# Patient Record
Sex: MALE | Race: WHITE | NOT HISPANIC OR LATINO | Employment: OTHER | RURAL
[De-identification: names, ages, dates, MRNs, and addresses within clinical notes are randomized per-mention and may not be internally consistent; named-entity substitution may affect disease eponyms.]

---

## 2017-03-06 ENCOUNTER — OFFICE VISIT (OUTPATIENT)
Dept: FAMILY MEDICINE CLINIC | Facility: CLINIC | Age: 57
End: 2017-03-06

## 2017-03-06 VITALS
OXYGEN SATURATION: 98 % | TEMPERATURE: 99 F | DIASTOLIC BLOOD PRESSURE: 80 MMHG | HEART RATE: 70 BPM | WEIGHT: 164.8 LBS | HEIGHT: 66 IN | SYSTOLIC BLOOD PRESSURE: 130 MMHG | BODY MASS INDEX: 26.48 KG/M2

## 2017-03-06 DIAGNOSIS — S01.01XS SCALP LACERATION, SEQUELA: Primary | ICD-10-CM

## 2017-03-06 PROCEDURE — 99211 OFF/OP EST MAY X REQ PHY/QHP: CPT | Performed by: FAMILY MEDICINE

## 2017-03-06 NOTE — PROGRESS NOTES
Procedure   Suture Removal  Date/Time: 3/6/2017 2:02 PM  Performed by: RICHIE LAZCANO  Authorized by: RICHIE LAZCANO   Body area: head/neck  Location details: scalp  Wound Appearance: clean  Staples Removed: 4  Sutures placed in this facility: Elsewhere.  Patient tolerance: Patient tolerated the procedure well with no immediate complications

## 2017-03-29 ENCOUNTER — OFFICE VISIT (OUTPATIENT)
Dept: FAMILY MEDICINE CLINIC | Facility: CLINIC | Age: 57
End: 2017-03-29

## 2017-03-29 VITALS
HEART RATE: 67 BPM | HEIGHT: 66 IN | BODY MASS INDEX: 26.45 KG/M2 | TEMPERATURE: 98.4 F | SYSTOLIC BLOOD PRESSURE: 132 MMHG | OXYGEN SATURATION: 99 % | DIASTOLIC BLOOD PRESSURE: 72 MMHG | WEIGHT: 164.6 LBS

## 2017-03-29 DIAGNOSIS — R53.83 FATIGUE, UNSPECIFIED TYPE: ICD-10-CM

## 2017-03-29 DIAGNOSIS — E55.9 UNSPECIFIED VITAMIN D DEFICIENCY: ICD-10-CM

## 2017-03-29 DIAGNOSIS — Z86.010 HISTORY OF COLON POLYPS: ICD-10-CM

## 2017-03-29 DIAGNOSIS — Z72.89 OTHER PROBLEMS RELATED TO LIFESTYLE: ICD-10-CM

## 2017-03-29 DIAGNOSIS — Z00.00 ANNUAL PHYSICAL EXAM: ICD-10-CM

## 2017-03-29 DIAGNOSIS — R07.89 CHEST DISCOMFORT: ICD-10-CM

## 2017-03-29 DIAGNOSIS — Z23 NEED FOR PROPHYLACTIC VACCINATION AGAINST STREPTOCOCCUS PNEUMONIAE (PNEUMOCOCCUS): ICD-10-CM

## 2017-03-29 DIAGNOSIS — E78.2 MIXED HYPERLIPIDEMIA: Primary | ICD-10-CM

## 2017-03-29 DIAGNOSIS — Z12.5 SPECIAL SCREENING FOR MALIGNANT NEOPLASM OF PROSTATE: ICD-10-CM

## 2017-03-29 DIAGNOSIS — N40.0 BENIGN NON-NODULAR PROSTATIC HYPERPLASIA WITHOUT LOWER URINARY TRACT SYMPTOMS: ICD-10-CM

## 2017-03-29 LAB
BILIRUB BLD-MCNC: NEGATIVE MG/DL
CLARITY, POC: CLEAR
COLOR UR: YELLOW
GLUCOSE UR STRIP-MCNC: NEGATIVE MG/DL
KETONES UR QL: NEGATIVE
LEUKOCYTE EST, POC: NEGATIVE
NITRITE UR-MCNC: NEGATIVE MG/ML
PH UR: 7 [PH] (ref 5–8)
PROT UR STRIP-MCNC: NEGATIVE MG/DL
RBC # UR STRIP: NEGATIVE /UL
SP GR UR: 1.02 (ref 1–1.03)
UROBILINOGEN UR QL: NORMAL

## 2017-03-29 PROCEDURE — G0439 PPPS, SUBSEQ VISIT: HCPCS | Performed by: FAMILY MEDICINE

## 2017-03-29 PROCEDURE — 81003 URINALYSIS AUTO W/O SCOPE: CPT | Performed by: FAMILY MEDICINE

## 2017-03-29 PROCEDURE — G0102 PROSTATE CA SCREENING; DRE: HCPCS | Performed by: FAMILY MEDICINE

## 2017-03-29 RX ORDER — PRAVASTATIN SODIUM 40 MG
TABLET ORAL
Refills: 1 | COMMUNITY
Start: 2017-02-28 | End: 2017-10-26 | Stop reason: SDUPTHER

## 2017-03-29 RX ORDER — MELOXICAM 7.5 MG/1
7.5 TABLET ORAL DAILY
COMMUNITY
End: 2018-08-23

## 2017-03-29 RX ORDER — MELOXICAM 15 MG/1
7.5 TABLET ORAL
COMMUNITY
Start: 2017-03-26 | End: 2017-03-29 | Stop reason: SDUPTHER

## 2017-03-29 RX ORDER — LISINOPRIL 20 MG/1
TABLET ORAL
Refills: 3 | COMMUNITY
Start: 2017-02-23 | End: 2018-02-19 | Stop reason: SDUPTHER

## 2017-03-29 RX ORDER — GABAPENTIN 600 MG/1
TABLET ORAL 3 TIMES DAILY
Refills: 2 | COMMUNITY
Start: 2017-02-28

## 2017-03-29 RX ORDER — OXYCODONE AND ACETAMINOPHEN 10; 325 MG/1; MG/1
TABLET ORAL
Refills: 0 | COMMUNITY
Start: 2017-02-28 | End: 2019-07-22

## 2017-03-29 NOTE — PATIENT INSTRUCTIONS
Exercising to Stay Healthy  Exercising regularly is important. It has many health benefits, such as:  · Improving your overall fitness, flexibility, and endurance.  · Increasing your bone density.  · Helping with weight control.  · Decreasing your body fat.  · Increasing your muscle strength.  · Reducing stress and tension.  · Improving your overall health.  In order to become healthy and stay healthy, it is recommended that you do moderate-intensity and vigorous-intensity exercise. You can tell that you are exercising at a moderate intensity if you have a higher heart rate and faster breathing, but you are still able to hold a conversation. You can tell that you are exercising at a vigorous intensity if you are breathing much harder and faster and cannot hold a conversation while exercising.  HOW OFTEN SHOULD I EXERCISE?  Choose an activity that you enjoy and set realistic goals. Your health care provider can help you to make an activity plan that works for you. Exercise regularly as directed by your health care provider. This may include:   · Doing resistance training twice each week, such as:    Push-ups.    Sit-ups.    Lifting weights.    Using resistance bands.  · Doing a given intensity of exercise for a given amount of time. Choose from these options:    150 minutes of moderate-intensity exercise every week.    75 minutes of vigorous-intensity exercise every week.    A mix of moderate-intensity and vigorous-intensity exercise every week.  Children, pregnant women, people who are out of shape, people who are overweight, and older adults may need to consult a health care provider for individual recommendations. If you have any sort of medical condition, be sure to consult your health care provider before starting a new exercise program.   WHAT ARE SOME EXERCISE IDEAS?  Some moderate-intensity exercise ideas include:   · Walking at a rate of 1 mile in 15  minutes.  · Biking.  · Hiking.  · Golfing.  · Dancing.  Some vigorous-intensity exercise ideas include:   · Walking at a rate of at least 4.5 miles per hour.  · Jogging or running at a rate of 5 miles per hour.  · Biking at a rate of at least 10 miles per hour.  · Lap swimming.  · Roller-skating or in-line skating.  · Cross-country skiing.  · Vigorous competitive sports, such as football, basketball, and soccer.  · Jumping rope.  · Aerobic dancing.  WHAT ARE SOME EVERYDAY ACTIVITIES THAT CAN HELP ME TO GET EXERCISE?  · Yard work, such as:    Pushing a .    Raking and bagging leaves.  · Washing and waxing your car.  · Pushing a stroller.  · Shoveling snow.  · Gardening.  · Washing windows or floors.  HOW CAN I BE MORE ACTIVE IN MY DAY-TO-DAY ACTIVITIES?  · Use the stairs instead of the elevator.  · Take a walk during your lunch break.  · If you drive, park your car farther away from work or school.  · If you take public transportation, get off one stop early and walk the rest of the way.  · Make all of your phone calls while standing up and walking around.  · Get up, stretch, and walk around every 30 minutes throughout the day.  WHAT GUIDELINES SHOULD I FOLLOW WHILE EXERCISING?  · Do not exercise so much that you hurt yourself, feel dizzy, or get very short of breath.  · Consult your health care provider before starting a new exercise program.  · Wear comfortable clothes and shoes with good support.  · Drink plenty of water while you exercise to prevent dehydration or heat stroke. Body water is lost during exercise and must be replaced.  · Work out until you breathe faster and your heart beats faster.     This information is not intended to replace advice given to you by your health care provider. Make sure you discuss any questions you have with your health care provider.     Document Released: 01/20/2012 Document Revised: 01/08/2016 Document Reviewed: 05/21/2015  [employer] Patient Education  © [employer]    Medicare Wellness  Personal Prevention Plan of Service     Date of Office Visit:  2017  Encounter Provider:  Bennett Villanueva MD  Place of Service:  [facility]  Patient Name: Bryant Delgado  :  1960    As part of the Medicare Wellness portion of your visit today, we are providing you with this personalized preventive plan of services (PPPS). This plan is based upon recommendations of the [place] Preventive Services Task Force (USPSTF) and the Advisory Committee on Immunization Practices (ACIP).    This lists the preventive care services that should be considered, and provides dates of when you are due. Items listed as completed are up-to-date and do not require any further intervention.    Health Maintenance   Topic Date Due   • TDAP/TD VACCINES (1 - Tdap) 1979   • INFLUENZA VACCINE  2016   • HEPATITIS C SCREENING  2017   • LIPID PANEL  2017   • MEDICARE ANNUAL WELLNESS  2017   • COLONOSCOPY  10/03/2022       Orders Placed This Encounter   Procedures   • TSH   • T4, free   • Comprehensive Metabolic Panel   • Lipid Panel   • PSA   • Vitamin D 25 Hydroxy       Return in 6 months (on 2017).

## 2017-03-29 NOTE — PROGRESS NOTES
QUICK REFERENCE INFORMATION:  The ABCs of the Annual Wellness Visit    Subsequent Medicare Wellness Visit    HEALTH RISK ASSESSMENT    1960    Recent Hospitalizations:  No recent hospitalization(s)..        Current Medical Providers:  Patient Care Team:  Bennett Villanueva MD as PCP - General (Family Medicine)        Smoking Status:  History   Smoking Status   • Never Smoker   Smokeless Tobacco   • Current User       Alcohol Consumption:  History   Alcohol Use No       Depression Screen:   PHQ-9 Depression Screening 3/29/2017   Little interest or pleasure in doing things 0   Feeling down, depressed, or hopeless 0   Trouble falling or staying asleep, or sleeping too much 0   Feeling tired or having little energy 0   Poor appetite or overeating 0   Feeling bad about yourself - or that you are a failure or have let yourself or your family down 0   Trouble concentrating on things, such as reading the newspaper or watching television 0   Moving or speaking so slowly that other people could have noticed. Or the opposite - being so fidgety or restless that you have been moving around a lot more than usual 0   Thoughts that you would be better off dead, or of hurting yourself in some way 0   PHQ-9 Total Score 0   If you checked off any problems, how difficult have these problems made it for you to do your work, take care of things at home, or get along with other people? Not difficult at all       Health Habits and Functional and Cognitive Screening:  Functional & Cognitive Status 3/29/2017   Do you have difficulty preparing food and eating? No   Do you have difficulty bathing yourself? No   Do you have difficulty getting dressed? No   Do you have difficulty using the toilet? No   Do you have difficulty moving around from place to place? No   In the past year have you fallen or experienced a near fall? No   Do you need help using the phone?  No   Are you deaf or do you have serious difficulty hearing?  No   Do you  "need help with transportation? No   Do you need help shopping? No   Do you need help preparing meals?  No   Do you need help with housework?  No   Do you need help with laundry? No   Do you need help taking your medications? No   Do you need help managing money? No   Do you have difficulty concentrating, remembering or making decisions? No     -- The Timed Up and Go (TUG) Test (CDC Version)                  - Testing directions adhered to:                                  1) Patients wears regular footwear and may use walking aid(s) if    needed.                                  2) Patient to sit back in standard arm chair and identify a line 10 feet    away from patient on floor.                                  3) Test time begins at \"Go\" and stops when patient reseated in arm    chair.                    - Instructions read aloud (and demonstrated as applicable) to patient:                                      When I say \"Go,\" I want you to:                                    1) Stand up from the chair.                                  2) Walk to the line on the floor (10 feet away) at your normal pace.                                  3) Turn.                                  4) Walk back to the chair at your normal pace.                                  5) Sit down again.                    - Result: 3                    - Observations during test:Normal gait        Health Habits  Current Diet: Unhealthy Diet  Dental Exam: Not up to date    Eye Exam: Not up to date   Exercise (times per week): 0 times per week  Current Exercise Activities Include: None      Does the patient have evidence of cognitive impairment? No    Asprin use counseling:yes      Recent Lab Results:  CMP:     Lipid Panel:     LDL:     HbA1c:     Urine Microalbumin:     Visual Acuity:  No exam data present    Age-appropriate Screening Schedule:  Refer to the list below for future screening recommendations based on patient's age, sex and/or " medical conditions. Orders for these recommended tests are listed in the plan section. The patient has been provided with a written plan.    Health Maintenance   Topic Date Due   • TDAP/TD VACCINES (1 - Tdap) 02/24/1979   • INFLUENZA VACCINE  08/01/2016   • LIPID PANEL  03/29/2017   • COLONOSCOPY  10/03/2022        Subjective   History of Present Illness    Bryant Delgado is a 57 y.o. male who presents for an Subsequent Wellness Visit.    The following portions of the patient's history were reviewed and updated as appropriate: allergies, current medications, past family history, past medical history, past surgical history and problem list.    No outpatient prescriptions prior to visit.     No facility-administered medications prior to visit.        There is no problem list on file for this patient.      Advanced Care Planning:  has NO advanced directive - information provided to the patient today    Identification of Risk Factors:  Risk factors include: weight , unhealthy diet, cardiovascular risk and inactivity.    Review of Systems   Constitutional: Negative.    HENT: Negative.    Eyes: Negative.    Respiratory: Negative.    Cardiovascular:        Fluttery sensation in chest-breasts-not related to activity-cardiac referral   Gastrointestinal:        History of polyps plus family history positive for colon cancer-referred for colonoscopy   Endocrine: Negative.    Genitourinary: Negative.    Musculoskeletal: Negative.    Skin: Negative.    Neurological:        Last back surgery fall 2016   Hematological: Negative.    Psychiatric/Behavioral: Negative.        Compared to one year ago, the patient feels his physical health is worse.  Compared to one year ago, the patient feels his mental health is worse.    Objective     Physical Exam   Constitutional: He appears well-developed and well-nourished.   HENT:   Head: Normocephalic.   Right Ear: External ear normal.   Left Ear: External ear normal.   Mouth/Throat:  "Oropharynx is clear and moist.   Eyes: EOM are normal. Pupils are equal, round, and reactive to light.   Neck: Normal range of motion. No thyromegaly present.   Cardiovascular: Normal rate and regular rhythm.    No murmur heard.  Pulmonary/Chest: Effort normal and breath sounds normal. No respiratory distress.   Abdominal: Soft. Bowel sounds are normal.   Genitourinary: Rectum normal, prostate normal and penis normal.   Genitourinary Comments: Prostate exam no nodules raphae well-maintained   Musculoskeletal: Normal range of motion.   Lymphadenopathy:     He has no cervical adenopathy.   Neurological: He is alert.   Skin: Skin is warm and dry.   Psychiatric: He has a normal mood and affect. His behavior is normal. Thought content normal.   Vitals reviewed.      Vitals:    03/29/17 1003   BP: 132/72   Pulse: 67   Temp: 98.4 °F (36.9 °C)   SpO2: 99%   Weight: 164 lb 9.6 oz (74.7 kg)   Height: 66\" (167.6 cm)   PainSc: 0-No pain       Body mass index is 26.57 kg/(m^2).  Discussed the patient's BMI with him. The BMI is in the acceptable range.    Assessment/Plan   Patient Self-Management and Personalized Health Advice  The patient has been provided with information about: diet, exercise and weight management and preventive services including:   · Colorectal cancer screening, colonoscopy referral placed, Diabetes screening, see lab orders, Pneumococcal vaccine , Prostate cancer screening discussed.    Visit Diagnoses:    ICD-10-CM ICD-9-CM   1. Mixed hyperlipidemia E78.2 272.2   2. Fatigue, unspecified type R53.83 780.79   3. Unspecified vitamin D deficiency E55.9 268.9   4. Need for prophylactic vaccination against Streptococcus pneumoniae (pneumococcus) Z23 V03.82   5. Benign non-nodular prostatic hyperplasia without lower urinary tract symptoms N40.0 600.90       Orders Placed This Encounter   Procedures   • TSH   • T4, free   • Comprehensive Metabolic Panel   • Lipid Panel   • PSA   • Vitamin D 25 Hydroxy "       Outpatient Encounter Prescriptions as of 3/29/2017   Medication Sig Dispense Refill   • gabapentin (NEURONTIN) 600 MG tablet TAKE 1 TABLET BY MOUTH THREE TIMES A DAY FILL ON OR AFTER 1/28/17  2   • lisinopril (PRINIVIL,ZESTRIL) 20 MG tablet TAKE 1 TABLET BY MOUTH TWICE A DAY  3   • meloxicam (MOBIC) 7.5 MG tablet Take 7.5 mg by mouth Daily.     • oxyCODONE-acetaminophen (PERCOCET)  MG per tablet TAKE 1 TABLET BY MOUTH 3 TIMES A DAY  0   • pravastatin (PRAVACHOL) 40 MG tablet TAKE 1 TABLET BY MOUTH EVERY DAY AT BEDTIME  1   • [DISCONTINUED] meloxicam (MOBIC) 15 MG tablet 7.5 mg.       Facility-Administered Encounter Medications as of 3/29/2017   Medication Dose Route Frequency Provider Last Rate Last Dose   • pneumococcal conj. 13-valent (PREVNAR-13) vaccine 0.5 mL  0.5 mL Intramuscular Once Bennett Villanueva MD           Reviewed use of high risk medication in the elderly: yes  Reviewed for potential of harmful drug interactions in the elderly: yes    Follow Up:  Return in 6 months (on 9/29/2017).     An After Visit Summary and PPPS with all of these plans were given to the patient.       plan above-cardiology colonoscopy referrals

## 2017-03-30 ENCOUNTER — TELEPHONE (OUTPATIENT)
Dept: FAMILY MEDICINE CLINIC | Facility: CLINIC | Age: 57
End: 2017-03-30

## 2017-03-30 LAB
25(OH)D3+25(OH)D2 SERPL-MCNC: 22.4 NG/ML (ref 30–100)
ALBUMIN SERPL-MCNC: 4.1 G/DL (ref 3.5–5)
ALBUMIN/GLOB SERPL: 1.6 G/DL (ref 1.1–2.5)
ALP SERPL-CCNC: 51 U/L (ref 24–120)
ALT SERPL-CCNC: 20 U/L (ref 0–54)
AST SERPL-CCNC: 20 U/L (ref 7–45)
BASOPHILS # BLD AUTO: 0.02 10*3/MM3 (ref 0–0.2)
BASOPHILS NFR BLD AUTO: 0.4 % (ref 0–2)
BILIRUB SERPL-MCNC: 0.6 MG/DL (ref 0.1–1)
BUN SERPL-MCNC: 10 MG/DL (ref 5–21)
BUN/CREAT SERPL: 11 (ref 7–25)
CALCIUM SERPL-MCNC: 9.9 MG/DL (ref 8.4–10.4)
CHLORIDE SERPL-SCNC: 103 MMOL/L (ref 98–110)
CHOLEST SERPL-MCNC: 136 MG/DL (ref 130–200)
CO2 SERPL-SCNC: 28 MMOL/L (ref 24–31)
CREAT SERPL-MCNC: 0.91 MG/DL (ref 0.5–1.4)
EOSINOPHIL # BLD AUTO: 0.07 10*3/MM3 (ref 0–0.7)
EOSINOPHIL NFR BLD AUTO: 1.2 % (ref 0–4)
ERYTHROCYTE [DISTWIDTH] IN BLOOD BY AUTOMATED COUNT: 12.7 % (ref 12–15)
GLOBULIN SER CALC-MCNC: 2.6 GM/DL
GLUCOSE SERPL-MCNC: 96 MG/DL (ref 70–100)
HCT VFR BLD AUTO: 41.4 % (ref 40–52)
HCV AB S/CO SERPL IA: <0.1 S/CO RATIO (ref 0–0.9)
HDLC SERPL-MCNC: 43 MG/DL
HGB BLD-MCNC: 13.7 G/DL (ref 14–18)
IMM GRANULOCYTES # BLD: 0.01 10*3/MM3 (ref 0–0.03)
IMM GRANULOCYTES NFR BLD: 0.2 % (ref 0–5)
LDLC SERPL CALC-MCNC: 75 MG/DL (ref 0–99)
LYMPHOCYTES # BLD AUTO: 1.48 10*3/MM3 (ref 0.72–4.86)
LYMPHOCYTES NFR BLD AUTO: 26.2 % (ref 15–45)
MCH RBC QN AUTO: 29.7 PG (ref 28–32)
MCHC RBC AUTO-ENTMCNC: 33.1 G/DL (ref 33–36)
MCV RBC AUTO: 89.8 FL (ref 82–95)
MONOCYTES # BLD AUTO: 0.48 10*3/MM3 (ref 0.19–1.3)
MONOCYTES NFR BLD AUTO: 8.5 % (ref 4–12)
NEUTROPHILS # BLD AUTO: 3.59 10*3/MM3 (ref 1.87–8.4)
NEUTROPHILS NFR BLD AUTO: 63.5 % (ref 39–78)
PLATELET # BLD AUTO: 210 10*3/MM3 (ref 130–400)
POTASSIUM SERPL-SCNC: 4.5 MMOL/L (ref 3.5–5.3)
PROT SERPL-MCNC: 6.7 G/DL (ref 6.3–8.7)
PSA SERPL-MCNC: 0.59 NG/ML (ref 0–4)
RBC # BLD AUTO: 4.61 10*6/MM3 (ref 4.8–5.9)
SODIUM SERPL-SCNC: 142 MMOL/L (ref 135–145)
T4 FREE SERPL-MCNC: 0.84 NG/DL (ref 0.78–2.19)
TRIGL SERPL-MCNC: 91 MG/DL (ref 0–149)
TSH SERPL DL<=0.005 MIU/L-ACNC: 0.73 MIU/ML (ref 0.47–4.68)
VLDLC SERPL CALC-MCNC: 18.2 MG/DL
WBC # BLD AUTO: 5.65 10*3/MM3 (ref 4.8–10.8)

## 2017-03-30 NOTE — TELEPHONE ENCOUNTER
----- Message from Bennett Villanueva MD sent at 3/30/2017  7:19 AM CDT -----  Lab excellent except needs vitamin D 2000 international units a day–continue same also, copy to Dr. Alba

## 2017-10-26 RX ORDER — PRAVASTATIN SODIUM 40 MG
TABLET ORAL
Qty: 90 TABLET | Refills: 1 | Status: SHIPPED | OUTPATIENT
Start: 2017-10-26 | End: 2018-04-20 | Stop reason: SDUPTHER

## 2018-02-19 RX ORDER — LISINOPRIL 20 MG/1
TABLET ORAL
Qty: 180 TABLET | Refills: 0 | Status: SHIPPED | OUTPATIENT
Start: 2018-02-19 | End: 2018-05-21 | Stop reason: SDUPTHER

## 2018-04-20 RX ORDER — PRAVASTATIN SODIUM 40 MG
TABLET ORAL
Qty: 30 TABLET | Refills: 0 | Status: SHIPPED | OUTPATIENT
Start: 2018-04-20 | End: 2018-05-10 | Stop reason: SDUPTHER

## 2018-04-30 ENCOUNTER — TELEPHONE (OUTPATIENT)
Dept: FAMILY MEDICINE CLINIC | Facility: CLINIC | Age: 58
End: 2018-04-30

## 2018-05-09 ENCOUNTER — OFFICE VISIT (OUTPATIENT)
Dept: FAMILY MEDICINE CLINIC | Facility: CLINIC | Age: 58
End: 2018-05-09

## 2018-05-09 VITALS
SYSTOLIC BLOOD PRESSURE: 137 MMHG | DIASTOLIC BLOOD PRESSURE: 86 MMHG | HEIGHT: 66 IN | OXYGEN SATURATION: 99 % | TEMPERATURE: 98.8 F | WEIGHT: 167.6 LBS | HEART RATE: 68 BPM | BODY MASS INDEX: 26.93 KG/M2

## 2018-05-09 DIAGNOSIS — Z00.00 ANNUAL PHYSICAL EXAM: ICD-10-CM

## 2018-05-09 DIAGNOSIS — E78.2 MIXED HYPERLIPIDEMIA: ICD-10-CM

## 2018-05-09 DIAGNOSIS — E55.9 VITAMIN D DEFICIENCY: ICD-10-CM

## 2018-05-09 DIAGNOSIS — R53.83 FATIGUE, UNSPECIFIED TYPE: Primary | ICD-10-CM

## 2018-05-09 DIAGNOSIS — Z12.5 SPECIAL SCREENING FOR MALIGNANT NEOPLASM OF PROSTATE: ICD-10-CM

## 2018-05-09 DIAGNOSIS — I10 HYPERTENSION, UNSPECIFIED TYPE: ICD-10-CM

## 2018-05-09 LAB
BILIRUB BLD-MCNC: NEGATIVE MG/DL
CLARITY, POC: CLEAR
COLOR UR: YELLOW
GLUCOSE UR STRIP-MCNC: NEGATIVE MG/DL
KETONES UR QL: NEGATIVE
LEUKOCYTE EST, POC: NEGATIVE
NITRITE UR-MCNC: NEGATIVE MG/ML
PH UR: 5.5 [PH] (ref 5–8)
PROT UR STRIP-MCNC: NEGATIVE MG/DL
RBC # UR STRIP: ABNORMAL /UL
SP GR UR: 1.02 (ref 1–1.03)
UROBILINOGEN UR QL: NORMAL

## 2018-05-09 PROCEDURE — 81003 URINALYSIS AUTO W/O SCOPE: CPT | Performed by: FAMILY MEDICINE

## 2018-05-09 PROCEDURE — G0102 PROSTATE CA SCREENING; DRE: HCPCS | Performed by: FAMILY MEDICINE

## 2018-05-09 PROCEDURE — G0439 PPPS, SUBSEQ VISIT: HCPCS | Performed by: FAMILY MEDICINE

## 2018-05-09 RX ORDER — CYCLOBENZAPRINE HCL 10 MG
TABLET ORAL
COMMUNITY
Start: 2018-02-22 | End: 2020-08-31

## 2018-05-09 NOTE — PATIENT INSTRUCTIONS
Exercising to Stay Healthy  Exercising regularly is important. It has many health benefits, such as:  · Improving your overall fitness, flexibility, and endurance.  · Increasing your bone density.  · Helping with weight control.  · Decreasing your body fat.  · Increasing your muscle strength.  · Reducing stress and tension.  · Improving your overall health.  In order to become healthy and stay healthy, it is recommended that you do moderate-intensity and vigorous-intensity exercise. You can tell that you are exercising at a moderate intensity if you have a higher heart rate and faster breathing, but you are still able to hold a conversation. You can tell that you are exercising at a vigorous intensity if you are breathing much harder and faster and cannot hold a conversation while exercising.  How often should I exercise?  Choose an activity that you enjoy and set realistic goals. Your health care provider can help you to make an activity plan that works for you. Exercise regularly as directed by your health care provider. This may include:  · Doing resistance training twice each week, such as:  ¨ Push-ups.  ¨ Sit-ups.  ¨ Lifting weights.  ¨ Using resistance bands.  · Doing a given intensity of exercise for a given amount of time. Choose from these options:  ¨ 150 minutes of moderate-intensity exercise every week.  ¨ 75 minutes of vigorous-intensity exercise every week.  ¨ A mix of moderate-intensity and vigorous-intensity exercise every week.  Children, pregnant women, people who are out of shape, people who are overweight, and older adults may need to consult a health care provider for individual recommendations. If you have any sort of medical condition, be sure to consult your health care provider before starting a new exercise program.  What are some exercise ideas?  Some moderate-intensity exercise ideas include:  · Walking at a rate of 1 mile in 15  minutes.  · Biking.  · Hiking.  · Golfing.  · Dancing.  Some vigorous-intensity exercise ideas include:  · Walking at a rate of at least 4.5 miles per hour.  · Jogging or running at a rate of 5 miles per hour.  · Biking at a rate of at least 10 miles per hour.  · Lap swimming.  · Roller-skating or in-line skating.  · Cross-country skiing.  · Vigorous competitive sports, such as football, basketball, and soccer.  · Jumping rope.  · Aerobic dancing.  What are some everyday activities that can help me to get exercise?  · Yard work, such as:  ¨ Pushing a .  ¨ Raking and bagging leaves.  · Washing and waxing your car.  · Pushing a stroller.  · Shoveling snow.  · Gardening.  · Washing windows or floors.  How can I be more active in my day-to-day activities?  · Use the stairs instead of the elevator.  · Take a walk during your lunch break.  · If you drive, park your car farther away from work or school.  · If you take public transportation, get off one stop early and walk the rest of the way.  · Make all of your phone calls while standing up and walking around.  · Get up, stretch, and walk around every 30 minutes throughout the day.  What guidelines should I follow while exercising?  · Do not exercise so much that you hurt yourself, feel dizzy, or get very short of breath.  · Consult your health care provider before starting a new exercise program.  · Wear comfortable clothes and shoes with good support.  · Drink plenty of water while you exercise to prevent dehydration or heat stroke. Body water is lost during exercise and must be replaced.  · Work out until you breathe faster and your heart beats faster.  This information is not intended to replace advice given to you by your health care provider. Make sure you discuss any questions you have with your health care provider.  Document Released: 01/20/2012 Document Revised: 05/25/2017 Document Reviewed: 05/21/2015  [employer] Interactive Patient Education © 2017  [employer]    Fall Prevention in the Home  Falls can cause injuries and can affect people from all age groups. There are many simple things that you can do to make your home safe and to help prevent falls.  What can I do on the outside of my home?  · Regularly repair the edges of walkways and driveways and fix any cracks.  · Remove high doorway thresholds.  · Trim any shrubbery on the main path into your home.  · Use bright outdoor lighting.  · Clear walkways of debris and clutter, including tools and rocks.  · Regularly check that handrails are securely fastened and in good repair. Both sides of any steps should have handrails.  · Install guardrails along the edges of any raised decks or porches.  · Have leaves, snow, and ice cleared regularly.  · Use sand or salt on walkways during winter months.  · In the garage, clean up any spills right away, including grease or oil spills.  What can I do in the bathroom?  · Use night lights.  · Install grab bars by the toilet and in the tub and shower. Do not use towel bars as grab bars.  · Use non-skid mats or decals on the floor of the tub or shower.  · If you need to sit down while you are in the shower, use a plastic, non-slip stool.  · Keep the floor dry. Immediately clean up any water that spills on the floor.  · Remove soap buildup in the tub or shower on a regular basis.  · Attach bath mats securely with double-sided non-slip rug tape.  · Remove throw rugs and other tripping hazards from the floor.  What can I do in the bedroom?  · Use night lights.  · Make sure that a bedside light is easy to reach.  · Do not use oversized bedding that drapes onto the floor.  · Have a firm chair that has side arms to use for getting dressed.  · Remove throw rugs and other tripping hazards from the floor.  What can I do in the kitchen?  · Clean up any spills right away.  · Avoid walking on wet floors.  · Place frequently used items in easy-to-reach places.  · If you need to reach  for something above you, use a sturdy step stool that has a grab bar.  · Keep electrical cables out of the way.  · Do not use floor polish or wax that makes floors slippery. If you have to use wax, make sure that it is non-skid floor wax.  · Remove throw rugs and other tripping hazards from the floor.  What can I do in the stairways?  · Do not leave any items on the stairs.  · Make sure that there are handrails on both sides of the stairs. Fix handrails that are broken or loose. Make sure that handrails are as long as the stairways.  · Check any carpeting to make sure that it is firmly attached to the stairs. Fix any carpet that is loose or worn.  · Avoid having throw rugs at the top or bottom of stairways, or secure the rugs with carpet tape to prevent them from moving.  · Make sure that you have a light switch at the top of the stairs and the bottom of the stairs. If you do not have them, have them installed.  What are some other fall prevention tips?  · Wear closed-toe shoes that fit well and support your feet. Wear shoes that have rubber soles or low heels.  · When you use a stepladder, make sure that it is completely opened and that the sides are firmly locked. Have someone hold the ladder while you are using it. Do not climb a closed stepladder.  · Add color or contrast paint or tape to grab bars and handrails in your home. Place contrasting color strips on the first and last steps.  · Use mobility aids as needed, such as canes, walkers, scooters, and crutches.  · Turn on lights if it is dark. Replace any light bulbs that burn out.  · Set up furniture so that there are clear paths. Keep the furniture in the same spot.  · Fix any uneven floor surfaces.  · Choose a carpet design that does not hide the edge of steps of a stairway.  · Be aware of any and all pets.  · Review your medicines with your healthcare provider. Some medicines can cause dizziness or changes in blood pressure, which increase your risk of  falling.  Talk with your health care provider about other ways that you can decrease your risk of falls. This may include working with a physical therapist or  to improve your strength, balance, and endurance.  This information is not intended to replace advice given to you by your health care provider. Make sure you discuss any questions you have with your health care provider.  Document Released: 12/08/2003 Document Revised: 05/16/2017 Document Reviewed: 01/22/2016  [employer] Interactive Patient Education © 2017 [employer]    Mediterranean Diet  A Mediterranean diet refers to food and lifestyle choices that are based on the traditions of countries located on the [place]. This way of eating has been shown to help prevent certain conditions and improve outcomes for people who have chronic diseases, like kidney disease and heart disease.  What are tips for following this plan?  Lifestyle   · Cook and eat meals together with your family, when possible.  · Drink enough fluid to keep your urine clear or pale yellow.  · Be physically active every day. This includes:  ¨ Aerobic exercise like running or swimming.  ¨ Leisure activities like gardening, walking, or housework.  · Get 7-8 hours of sleep each night.  · If recommended by your health care provider, drink red wine in moderation. This means 1 glass a day for nonpregnant women and 2 glasses a day for men. A glass of wine equals 5 oz (150 mL).  Reading food labels   · Check the serving size of packaged foods. For foods such as rice and pasta, the serving size refers to the amount of cooked product, not dry.  · Check the total fat in packaged foods. Avoid foods that have saturated fat or trans fats.  · Check the ingredients list for added sugars, such as corn syrup.  Shopping   · At the grocery store, buy most of your food from the areas near the walls of the store. This includes:  ¨ Fresh fruits and vegetables (produce).  ¨ Grains, beans, nuts, and  seeds. Some of these may be available in unpackaged forms or large amounts (in bulk).  ¨ Fresh seafood.  ¨ Poultry and eggs.  ¨ Low-fat dairy products.  · Buy whole ingredients instead of prepackaged foods.  · Buy fresh fruits and vegetables in-season from local farmers markets.  · Buy frozen fruits and vegetables in resealable bags.  · If you do not have access to quality fresh seafood, buy precooked frozen shrimp or canned fish, such as tuna, salmon, or sardines.  · Buy small amounts of raw or cooked vegetables, salads, or olives from the deli or salad bar at your store.  · Stock your pantry so you always have certain foods on hand, such as olive oil, canned tuna, canned tomatoes, rice, pasta, and beans.  Cooking   · Cook foods with extra-virgin olive oil instead of using butter or other vegetable oils.  · Have meat as a side dish, and have vegetables or grains as your main dish. This means having meat in small portions or adding small amounts of meat to foods like pasta or stew.  · Use beans or vegetables instead of meat in common dishes like chili or lasagna.  · [place] with different cooking methods. Try roasting or broiling vegetables instead of steaming or sautéeing them.  · Add frozen vegetables to soups, stews, pasta, or rice.  · Add nuts or seeds for added healthy fat at each meal. You can add these to yogurt, salads, or vegetable dishes.  · Marinate fish or vegetables using olive oil, lemon juice, garlic, and fresh herbs.  Meal planning   · Plan to eat 1 vegetarian meal one day each week. Try to work up to 2 vegetarian meals, if possible.  · Eat seafood 2 or more times a week.  · Have healthy snacks readily available, such as:  ¨ Vegetable sticks with hummus.  ¨ Greek yogurt.  ¨ Fruit and nut trail mix.  · Eat balanced meals throughout the week. This includes:  ¨ Fruit: 2-3 servings a day  ¨ Vegetables: 4-5 servings a day  ¨ Low-fat dairy: 2 servings a day  ¨ Fish, poultry, or lean meat: 1 serving a  day  ¨ Beans and legumes: 2 or more servings a week  ¨ Nuts and seeds: 1-2 servings a day  ¨ Whole grains: 6-8 servings a day  ¨ Extra-virgin olive oil: 3-4 servings a day  · Limit red meat and sweets to only a few servings a month  What are my food choices?  · Mediterranean diet  ¨ Recommended  ¨ Grains: Whole-grain pasta. Brown rice. Bulgar wheat. Polenta. Couscous. Whole-wheat bread. Oatmeal. Quinoa.  ¨ Vegetables: Artichokes. Beets. Broccoli. Cabbage. Carrots. Eggplant. Green beans. Chard. Kale. Spinach. Onions. Leeks. Peas. Squash. Tomatoes. Peppers. Radishes.  ¨ Fruits: Apples. Apricots. Avocado. Berries. Bananas. Cherries. Dates. Figs. Grapes. Srinath. Melon. Oranges. Peaches. Plums. Pomegranate.  ¨ Meats and other protein foods: Beans. Almonds. Sunflower seeds. Pine nuts. Peanuts. Cod. [place]. Scallops. Shrimp. Tuna. Tilapia. Clams. Oysters. Eggs.  ¨ Dairy: Low-fat milk. Cheese. Greek yogurt.  ¨ Beverages: Water. Red wine. Herbal tea.  ¨ Fats and oils: Extra virgin olive oil. Avocado oil. Grape seed oil.  ¨ Sweets and desserts: Greek yogurt with honey. Baked apples. Poached pears. Trail mix.  ¨ Seasoning and other foods: Basil. Cilantro. Coriander. Cumin. Mint. Parsley. Eddie. Rosemary. Tarragon. Garlic. Oregano. Thyme. Pepper. Balsalmic vinegar. Tahini. Hummus. Tomato sauce. Olives. Mushrooms.  ¨ Limit these  ¨ Grains: Prepackaged pasta or rice dishes. Prepackaged cereal with added sugar.  ¨ Vegetables: Deep fried potatoes (french fries).  ¨ Fruits: Fruit canned in syrup.  ¨ Meats and other protein foods: Beef. Pork. Lamb. Poultry with skin. Hot dogs. [place].  ¨ Dairy: Ice cream. Sour cream. Whole milk.  ¨ Beverages: Juice. Sugar-sweetened soft drinks. Beer. Liquor and spirits.  ¨ Fats and oils: Butter. Canola oil. Vegetable oil. Beef fat (tallow). Lard.  ¨ Sweets and desserts: Cookies. Cakes. Pies. Candy.  ¨ Seasoning and other foods: Mayonnaise. Premade sauces and marinades.  ¨ The items listed may not be a  complete list. Talk with your dietitian about what dietary choices are right for you.  Summary  · The Mediterranean diet includes both food and lifestyle choices.  · Eat a variety of fresh fruits and vegetables, beans, nuts, seeds, and whole grains.  · Limit the amount of red meat and sweets that you eat.  · Talk with your health care provider about whether it is safe for you to drink red wine in moderation. This means 1 glass a day for nonpregnant women and 2 glasses a day for men. A glass of wine equals 5 oz (150 mL).  This information is not intended to replace advice given to you by your health care provider. Make sure you discuss any questions you have with your health care provider.  Document Released: 08/10/2017 Document Revised: 2017 Document Reviewed: 08/10/2017  [employer] Interactive Patient Education © 2017 [employer]    Medicare Wellness  Personal Prevention Plan of Service     Date of Office Visit:  2018  Encounter Provider:  Bennett Villanueva MD  Place of Service:  [facility]  Patient Name: Bryant Delgado  :  1960    As part of the Medicare Wellness portion of your visit today, we are providing you with this personalized preventive plan of services (PPPS). This plan is based upon recommendations of the [place] Preventive Services Task Force (USPSTF) and the Advisory Committee on Immunization Practices (ACIP).    This lists the preventive care services that should be considered, and provides dates of when you are due. Items listed as completed are up-to-date and do not require any further intervention.    Health Maintenance   Topic Date Due   • INFLUENZA VACCINE  2018   • MEDICARE ANNUAL WELLNESS  2019   • LIPID PANEL  2019   • COLONOSCOPY  10/03/2022   • HEPATITIS C SCREENING  Completed   • TDAP/TD VACCINES  Excluded       Orders Placed This Encounter   Procedures   • TSH   • T4, free   • Comprehensive Metabolic  Panel   • Lipid Panel   • PSA Screen   • Vitamin D 25 Hydroxy   • POC Urinalysis Dipstick, Automated   • CBC & Differential     Order Specific Question:   Manual Differential     Answer:   No       Return in 6 months (on 11/9/2018).

## 2018-05-09 NOTE — PROGRESS NOTES
QUICK REFERENCE INFORMATION:  The ABCs of the Annual Wellness Visit    Subsequent Medicare Wellness Visit    HEALTH RISK ASSESSMENT    1960    Recent Hospitalizations:  No hospitalization(s) within the last year..        Current Medical Providers:  Patient Care Team:  Bennett Villanueva MD as PCP - General (Family Medicine)        Smoking Status:  History   Smoking Status   • Never Smoker   Smokeless Tobacco   • Current User       Alcohol Consumption:  History   Alcohol Use No       Depression Screen:   PHQ-2/PHQ-9 Depression Screening 5/9/2018   Little interest or pleasure in doing things 0   Feeling down, depressed, or hopeless 0   Trouble falling or staying asleep, or sleeping too much -   Feeling tired or having little energy -   Poor appetite or overeating -   Feeling bad about yourself - or that you are a failure or have let yourself or your family down -   Trouble concentrating on things, such as reading the newspaper or watching television -   Moving or speaking so slowly that other people could have noticed. Or the opposite - being so fidgety or restless that you have been moving around a lot more than usual -   Thoughts that you would be better off dead, or of hurting yourself in some way -   Total Score 0   If you checked off any problems, how difficult have these problems made it for you to do your work, take care of things at home, or get along with other people? -       Health Habits and Functional and Cognitive Screening:  Functional & Cognitive Status 5/9/2018   Do you have difficulty preparing food and eating? No   Do you have difficulty bathing yourself, getting dressed or grooming yourself? No   Do you have difficulty using the toilet? Yes   Do you have difficulty moving around from place to place? Yes   Do you have trouble with steps or getting out of a bed or a chair? Yes   In the past year have you fallen or experienced a near fall? Yes   Current Diet Unhealthy Diet   Dental Exam Not  up to date   Eye Exam Up to date   Exercise (times per week) 0 times per week   Current Exercise Activities Include None   Do you need help using the phone?  Yes   Are you deaf or do you have serious difficulty hearing?  No   Do you need help with transportation? No   Do you need help shopping? No   Do you need help preparing meals?  No   Do you need help with housework?  No   Do you need help with laundry? No   Do you need help taking your medications? No   Do you need help managing money? No   Do you ever drive or ride in a car without wearing a seat belt? No   Have you felt unusual stress, anger or loneliness in the last month? Yes   Who do you live with? Spouse   If you need help, do you have trouble finding someone available to you? No   Have you been bothered in the last four weeks by sexual problems? No   Do you have difficulty concentrating, remembering or making decisions? Yes           Does the patient have evidence of cognitive impairment? Yes    Aspirin use counseling: Taking ASA appropriately as indicated      Recent Lab Results:  CMP:  Lab Results   Component Value Date    GLU 96 03/29/2017    BUN 10 03/29/2017    CREATININE 0.91 03/29/2017    EGFRIFNONA 86 03/29/2017    EGFRIFAFRI 104 03/29/2017    BCR 11.0 03/29/2017     03/29/2017    K 4.5 03/29/2017    CO2 28.0 03/29/2017    CALCIUM 9.9 03/29/2017    PROTENTOTREF 6.7 03/29/2017    ALBUMIN 4.10 03/29/2017    LABGLOBREF 2.6 03/29/2017    LABIL2 1.6 03/29/2017    BILITOT 0.6 03/29/2017    ALKPHOS 51 03/29/2017    AST 20 03/29/2017    ALT 20 03/29/2017     Lipid Panel:  Lab Results   Component Value Date    TRIG 91 03/29/2017    HDL 43 03/29/2017    VLDL 18.2 03/29/2017     HbA1c:       Visual Acuity:  No exam data present    Age-appropriate Screening Schedule:  Refer to the list below for future screening recommendations based on patient's age, sex and/or medical conditions. Orders for these recommended tests are listed in the plan section. The  patient has been provided with a written plan.    Health Maintenance   Topic Date Due   • INFLUENZA VACCINE  08/01/2018   • LIPID PANEL  05/09/2019   • COLONOSCOPY  10/03/2022   • TDAP/TD VACCINES  Excluded        Subjective   History of Present Illness    Bryant Delgado is a 58 y.o. male who presents for an Subsequent Wellness Visit.    The following portions of the patient's history were reviewed and updated as appropriate: allergies, current medications, past family history, past medical history, past social history, past surgical history and problem list.    Outpatient Medications Prior to Visit   Medication Sig Dispense Refill   • gabapentin (NEURONTIN) 600 MG tablet TAKE 1 TABLET BY MOUTH THREE TIMES A DAY FILL ON OR AFTER 1/28/17  2   • lisinopril (PRINIVIL,ZESTRIL) 20 MG tablet TAKE 1 TABLET BY MOUTH TWICE A  tablet 0   • oxyCODONE-acetaminophen (PERCOCET)  MG per tablet TAKE 1 TABLET BY MOUTH 3 TIMES A DAY  0   • pravastatin (PRAVACHOL) 40 MG tablet TAKE 1 TABLET BY MOUTH EVERY DAY AT BEDTIME 30 tablet 0   • meloxicam (MOBIC) 7.5 MG tablet Take 7.5 mg by mouth Daily.       No facility-administered medications prior to visit.        There is no problem list on file for this patient.      Advance Care Planning:  has NO advance directive - information provided to the patient today    Identification of Risk Factors:  Risk factors include: unhealthy diet, cardiovascular risk, inactivity and increased fall risk.    Review of Systems   HENT:        Getting teeth pulled   Cardiovascular: Negative for chest pain.        Has not taken cholesterol medicine about 10 days   Gastrointestinal:        Mother had colon cancer-was due last year but  endoscopist required payment of previous bill first   All other systems reviewed and are negative.      Compared to one year ago, the patient feels his physical health is the same.  Compared to one year ago, the patient feels his mental health is the  "same.    Objective     Physical Exam   Constitutional: He is oriented to person, place, and time. He appears well-developed and well-nourished.   HENT:   Right Ear: External ear normal.   Left Ear: External ear normal.   Mouth/Throat: Oropharynx is clear and moist.   Poor dentition   Eyes: EOM are normal. Pupils are equal, round, and reactive to light.   Neck: No thyromegaly present.   Normal carotids   Cardiovascular: Normal rate and regular rhythm.    Pulmonary/Chest: Effort normal and breath sounds normal.   Abdominal: Soft. Bowel sounds are normal. He exhibits no mass.   Genitourinary: Rectum normal, prostate normal and penis normal.   Genitourinary Comments: Testicles normal, no inguinal hernias or adenopathy-prostate normal size raphae maintained   Musculoskeletal: He exhibits no edema.   Lymphadenopathy:     He has no cervical adenopathy.   Neurological: He is alert and oriented to person, place, and time.   Skin: Skin is warm and dry.   Psychiatric: He has a normal mood and affect. His behavior is normal. Judgment and thought content normal.   Nursing note and vitals reviewed.      Vitals:    05/09/18 0846   BP: 137/86   BP Location: Right arm   Patient Position: Sitting   Cuff Size: Adult   Pulse: 68   Temp: 98.8 °F (37.1 °C)   TempSrc: Oral   SpO2: 99%   Weight: 76 kg (167 lb 9.6 oz)   Height: 167.6 cm (66\")   PainSc:   4   PainLoc: Back       Patient's Body mass index is 27.05 kg/m². BMI is within normal parameters. No follow-up required.      Assessment/Plan   Patient Self-Management and Personalized Health Advice  The patient has been provided with information about: diet, exercise, prevention of cardiac or vascular disease and fall prevention and preventive services including:   · Fall Risk plan of care done, Prostate cancer screening discussed.    Visit Diagnoses:    ICD-10-CM ICD-9-CM   1. Fatigue, unspecified type R53.83 780.79   2. Mixed hyperlipidemia E78.2 272.2   3. Special screening for " malignant neoplasm of prostate Z12.5 V76.44   4. Vitamin D deficiency E55.9 268.9       No orders of the defined types were placed in this encounter.  Plan-above-CN endoscopist about colonoscopy this year    Outpatient Encounter Prescriptions as of 5/9/2018   Medication Sig Dispense Refill   • gabapentin (NEURONTIN) 600 MG tablet TAKE 1 TABLET BY MOUTH THREE TIMES A DAY FILL ON OR AFTER 1/28/17  2   • lisinopril (PRINIVIL,ZESTRIL) 20 MG tablet TAKE 1 TABLET BY MOUTH TWICE A  tablet 0   • oxyCODONE-acetaminophen (PERCOCET)  MG per tablet TAKE 1 TABLET BY MOUTH 3 TIMES A DAY  0   • pravastatin (PRAVACHOL) 40 MG tablet TAKE 1 TABLET BY MOUTH EVERY DAY AT BEDTIME 30 tablet 0   • cyclobenzaprine (FLEXERIL) 10 MG tablet      • meloxicam (MOBIC) 7.5 MG tablet Take 7.5 mg by mouth Daily.       No facility-administered encounter medications on file as of 5/9/2018.        Reviewed use of high risk medication in the elderly: yes  Reviewed for potential of harmful drug interactions in the elderly: yes    Follow Up:  No Follow-up on file.     An After Visit Summary and PPPS with all of these plans were given to the patient.

## 2018-05-10 DIAGNOSIS — E03.9 ACQUIRED HYPOTHYROIDISM: ICD-10-CM

## 2018-05-10 DIAGNOSIS — E78.2 MIXED HYPERLIPIDEMIA: Primary | ICD-10-CM

## 2018-05-10 LAB
25(OH)D3+25(OH)D2 SERPL-MCNC: 21.3 NG/ML (ref 30–100)
ALBUMIN SERPL-MCNC: 3.9 G/DL (ref 3.5–5)
ALBUMIN/GLOB SERPL: 1.6 G/DL (ref 1.1–2.5)
ALP SERPL-CCNC: 46 U/L (ref 24–120)
ALT SERPL-CCNC: 25 U/L (ref 0–54)
AST SERPL-CCNC: 21 U/L (ref 7–45)
BASOPHILS # BLD AUTO: 0.04 10*3/MM3 (ref 0–0.2)
BASOPHILS NFR BLD AUTO: 0.8 % (ref 0–2)
BILIRUB SERPL-MCNC: 0.4 MG/DL (ref 0.1–1)
BUN SERPL-MCNC: 11 MG/DL (ref 5–21)
BUN/CREAT SERPL: 13.4 (ref 7–25)
CALCIUM SERPL-MCNC: 9.9 MG/DL (ref 8.4–10.4)
CHLORIDE SERPL-SCNC: 104 MMOL/L (ref 98–110)
CHOLEST SERPL-MCNC: 185 MG/DL (ref 130–200)
CO2 SERPL-SCNC: 26 MMOL/L (ref 24–31)
CREAT SERPL-MCNC: 0.82 MG/DL (ref 0.5–1.4)
EOSINOPHIL # BLD AUTO: 0.11 10*3/MM3 (ref 0–0.7)
EOSINOPHIL NFR BLD AUTO: 2.3 % (ref 0–4)
ERYTHROCYTE [DISTWIDTH] IN BLOOD BY AUTOMATED COUNT: 12.7 % (ref 12–15)
GFR SERPLBLD CREATININE-BSD FMLA CKD-EPI: 117 ML/MIN/1.73
GFR SERPLBLD CREATININE-BSD FMLA CKD-EPI: 96 ML/MIN/1.73
GLOBULIN SER CALC-MCNC: 2.5 GM/DL
GLUCOSE SERPL-MCNC: 95 MG/DL (ref 70–100)
HCT VFR BLD AUTO: 42.9 % (ref 40–52)
HDLC SERPL-MCNC: 46 MG/DL
HGB BLD-MCNC: 14.1 G/DL (ref 14–18)
IMM GRANULOCYTES # BLD: 0.02 10*3/MM3 (ref 0–0.03)
IMM GRANULOCYTES NFR BLD: 0.4 % (ref 0–5)
LDLC SERPL CALC-MCNC: 127 MG/DL (ref 0–99)
LYMPHOCYTES # BLD AUTO: 1.62 10*3/MM3 (ref 0.72–4.86)
LYMPHOCYTES NFR BLD AUTO: 33.5 % (ref 15–45)
MCH RBC QN AUTO: 29.9 PG (ref 28–32)
MCHC RBC AUTO-ENTMCNC: 32.9 G/DL (ref 33–36)
MCV RBC AUTO: 91.1 FL (ref 82–95)
MONOCYTES # BLD AUTO: 0.6 10*3/MM3 (ref 0.19–1.3)
MONOCYTES NFR BLD AUTO: 12.4 % (ref 4–12)
NEUTROPHILS # BLD AUTO: 2.44 10*3/MM3 (ref 1.87–8.4)
NEUTROPHILS NFR BLD AUTO: 50.6 % (ref 39–78)
NRBC BLD AUTO-RTO: 0 /100 WBC (ref 0–0)
PLATELET # BLD AUTO: 232 10*3/MM3 (ref 130–400)
POTASSIUM SERPL-SCNC: 4.7 MMOL/L (ref 3.5–5.3)
PROT SERPL-MCNC: 6.4 G/DL (ref 6.3–8.7)
PSA SERPL-MCNC: 0.56 NG/ML (ref 0–4)
RBC # BLD AUTO: 4.71 10*6/MM3 (ref 4.8–5.9)
SODIUM SERPL-SCNC: 141 MMOL/L (ref 135–145)
T4 FREE SERPL-MCNC: 0.95 NG/DL (ref 0.78–2.19)
TRIGL SERPL-MCNC: 58 MG/DL (ref 0–149)
TSH SERPL DL<=0.005 MIU/L-ACNC: 0.38 MIU/ML (ref 0.47–4.68)
VLDLC SERPL CALC-MCNC: 11.6 MG/DL
WBC # BLD AUTO: 4.83 10*3/MM3 (ref 4.8–10.8)

## 2018-05-10 RX ORDER — PRAVASTATIN SODIUM 40 MG
40 TABLET ORAL
Qty: 90 TABLET | Refills: 3 | Status: SHIPPED | OUTPATIENT
Start: 2018-05-10 | End: 2018-08-11 | Stop reason: SDUPTHER

## 2018-05-22 RX ORDER — LISINOPRIL 20 MG/1
TABLET ORAL
Qty: 180 TABLET | Refills: 3 | Status: SHIPPED | OUTPATIENT
Start: 2018-05-22 | End: 2018-08-11 | Stop reason: SDUPTHER

## 2018-07-10 ENCOUNTER — RESULTS ENCOUNTER (OUTPATIENT)
Dept: FAMILY MEDICINE CLINIC | Facility: CLINIC | Age: 58
End: 2018-07-10

## 2018-07-10 DIAGNOSIS — E78.2 MIXED HYPERLIPIDEMIA: ICD-10-CM

## 2018-07-10 DIAGNOSIS — E03.9 ACQUIRED HYPOTHYROIDISM: ICD-10-CM

## 2018-08-07 LAB
ALBUMIN SERPL-MCNC: 4.3 G/DL (ref 3.5–5)
ALBUMIN/GLOB SERPL: 1.7 G/DL (ref 1.1–2.5)
ALP SERPL-CCNC: 52 U/L (ref 24–120)
ALT SERPL-CCNC: 31 U/L (ref 0–54)
AST SERPL-CCNC: 25 U/L (ref 7–45)
BILIRUB SERPL-MCNC: 0.3 MG/DL (ref 0.1–1)
BUN SERPL-MCNC: 9 MG/DL (ref 5–21)
BUN/CREAT SERPL: 10.2 (ref 7–25)
CALCIUM SERPL-MCNC: 10 MG/DL (ref 8.4–10.4)
CHLORIDE SERPL-SCNC: 105 MMOL/L (ref 98–110)
CHOLEST SERPL-MCNC: 154 MG/DL (ref 130–200)
CO2 SERPL-SCNC: 25 MMOL/L (ref 24–31)
CREAT SERPL-MCNC: 0.88 MG/DL (ref 0.5–1.4)
GLOBULIN SER CALC-MCNC: 2.6 GM/DL
GLUCOSE SERPL-MCNC: 95 MG/DL (ref 70–100)
HDLC SERPL-MCNC: 44 MG/DL
LDLC SERPL CALC-MCNC: 91 MG/DL (ref 0–99)
POTASSIUM SERPL-SCNC: 4.9 MMOL/L (ref 3.5–5.3)
PROT SERPL-MCNC: 6.9 G/DL (ref 6.3–8.7)
SODIUM SERPL-SCNC: 143 MMOL/L (ref 135–145)
T4 FREE SERPL-MCNC: 0.81 NG/DL (ref 0.78–2.19)
TRIGL SERPL-MCNC: 96 MG/DL (ref 0–149)
TSH SERPL DL<=0.005 MIU/L-ACNC: 0.79 MIU/ML (ref 0.47–4.68)
VLDLC SERPL CALC-MCNC: 19.2 MG/DL

## 2018-08-10 NOTE — TELEPHONE ENCOUNTER
MED REFILL REQUEST        PRAVASTATIN 40MG (1) QHS   LISINOPRIL 20MG (1) BID    90 DAYS    [facility]

## 2018-08-11 RX ORDER — PRAVASTATIN SODIUM 40 MG
40 TABLET ORAL
Qty: 90 TABLET | Refills: 1 | Status: SHIPPED | OUTPATIENT
Start: 2018-08-11 | End: 2020-09-11

## 2018-08-11 RX ORDER — LISINOPRIL 20 MG/1
20 TABLET ORAL 2 TIMES DAILY
Qty: 180 TABLET | Refills: 1 | Status: SHIPPED | OUTPATIENT
Start: 2018-08-11 | End: 2020-08-14

## 2018-08-23 ENCOUNTER — OFFICE VISIT (OUTPATIENT)
Dept: FAMILY MEDICINE CLINIC | Facility: CLINIC | Age: 58
End: 2018-08-23

## 2018-08-23 VITALS
OXYGEN SATURATION: 98 % | BODY MASS INDEX: 28.12 KG/M2 | DIASTOLIC BLOOD PRESSURE: 73 MMHG | WEIGHT: 175 LBS | SYSTOLIC BLOOD PRESSURE: 131 MMHG | HEART RATE: 67 BPM | TEMPERATURE: 98.1 F | HEIGHT: 66 IN

## 2018-08-23 DIAGNOSIS — J01.10 SUBACUTE FRONTAL SINUSITIS: Primary | ICD-10-CM

## 2018-08-23 PROCEDURE — 99213 OFFICE O/P EST LOW 20 MIN: CPT | Performed by: FAMILY MEDICINE

## 2018-08-23 NOTE — PROGRESS NOTES
Subjective   Bryant Delgado is a 58 y.o. male.     Eye Problem    The left eye is affected. This is a new problem. The current episode started in the past 7 days. The problem occurs daily. The problem has been gradually improving. There was no injury mechanism. The patient is experiencing no pain. There is no known exposure to pink eye. He does not wear contacts. Associated symptoms include eye redness. Pertinent negatives include no blurred vision. He has tried nothing for the symptoms.       The following portions of the patient's history were reviewed and updated as appropriate: allergies, current medications, past family history, past medical history, past social history, past surgical history and problem list.    Review of Systems   HENT: Positive for facial swelling, postnasal drip and sinus pain.    Eyes: Positive for redness. Negative for blurred vision.       Objective   Physical Exam   Constitutional: He is oriented to person, place, and time.   HENT:   Nose: Nose normal.   Mouth/Throat: Oropharynx is clear and moist.   Eyes: Left eye exhibits no discharge.   Subconjunctival bruise left inner aspect-observation   Cardiovascular: Normal rate and regular rhythm.    Pulmonary/Chest: Effort normal and breath sounds normal.   Lymphadenopathy:     He has no cervical adenopathy.   Neurological: He is alert and oriented to person, place, and time.   Psychiatric: He has a normal mood and affect. His behavior is normal. Thought content normal.   Nursing note and vitals reviewed.      Assessment/Plan   Bryant was seen today for eye problem.    Diagnoses and all orders for this visit:    Subacute frontal sinusitis       Flonase plus heat for eye

## 2018-11-15 ENCOUNTER — OFFICE VISIT (OUTPATIENT)
Dept: FAMILY MEDICINE CLINIC | Facility: CLINIC | Age: 58
End: 2018-11-15

## 2018-11-15 VITALS
WEIGHT: 171.2 LBS | DIASTOLIC BLOOD PRESSURE: 78 MMHG | OXYGEN SATURATION: 98 % | BODY MASS INDEX: 27.51 KG/M2 | SYSTOLIC BLOOD PRESSURE: 134 MMHG | TEMPERATURE: 98.2 F | HEART RATE: 65 BPM | HEIGHT: 66 IN

## 2018-11-15 DIAGNOSIS — E78.2 MIXED HYPERLIPIDEMIA: Primary | ICD-10-CM

## 2018-11-15 DIAGNOSIS — Z23 NEED FOR IMMUNIZATION AGAINST INFLUENZA: ICD-10-CM

## 2018-11-15 LAB
ALBUMIN SERPL-MCNC: 4 G/DL (ref 3.5–5)
ALBUMIN/GLOB SERPL: 1.7 G/DL (ref 1.1–2.5)
ALP SERPL-CCNC: 44 U/L (ref 24–120)
ALT SERPL-CCNC: 30 U/L (ref 0–54)
AST SERPL-CCNC: 25 U/L (ref 7–45)
BILIRUB SERPL-MCNC: 0.6 MG/DL (ref 0.1–1)
BUN SERPL-MCNC: 9 MG/DL (ref 5–21)
BUN/CREAT SERPL: 10.3 (ref 7–25)
CALCIUM SERPL-MCNC: 9.9 MG/DL (ref 8.4–10.4)
CHLORIDE SERPL-SCNC: 105 MMOL/L (ref 98–110)
CHOLEST SERPL-MCNC: 138 MG/DL (ref 130–200)
CO2 SERPL-SCNC: 27 MMOL/L (ref 24–31)
CREAT SERPL-MCNC: 0.87 MG/DL (ref 0.5–1.4)
GLOBULIN SER CALC-MCNC: 2.4 GM/DL
GLUCOSE SERPL-MCNC: 100 MG/DL (ref 70–100)
HDLC SERPL-MCNC: 46 MG/DL
LDLC SERPL CALC-MCNC: 80 MG/DL (ref 0–99)
POTASSIUM SERPL-SCNC: 4.4 MMOL/L (ref 3.5–5.3)
PROT SERPL-MCNC: 6.4 G/DL (ref 6.3–8.7)
SODIUM SERPL-SCNC: 141 MMOL/L (ref 135–145)
TRIGL SERPL-MCNC: 60 MG/DL (ref 0–149)
VLDLC SERPL CALC-MCNC: 12 MG/DL

## 2018-11-15 PROCEDURE — 90674 CCIIV4 VAC NO PRSV 0.5 ML IM: CPT | Performed by: FAMILY MEDICINE

## 2018-11-15 PROCEDURE — 99213 OFFICE O/P EST LOW 20 MIN: CPT | Performed by: FAMILY MEDICINE

## 2018-11-15 PROCEDURE — G0008 ADMIN INFLUENZA VIRUS VAC: HCPCS | Performed by: FAMILY MEDICINE

## 2018-11-15 NOTE — PROGRESS NOTES
Subjective   Bryant Delgado is a 58 y.o. male.     Hyperlipidemia   This is a chronic problem. The current episode started more than 1 year ago. The problem is controlled. Recent lipid tests were reviewed and are normal. Pertinent negatives include no chest pain. Current antihyperlipidemic treatment includes diet change and statins. The current treatment provides significant improvement of lipids. There are no compliance problems.  Risk factors for coronary artery disease include dyslipidemia, hypertension, male sex and a sedentary lifestyle.       The following portions of the patient's history were reviewed and updated as appropriate: allergies, current medications, past family history, past medical history, past social history, past surgical history and problem list.    Review of Systems   Cardiovascular: Negative for chest pain and leg swelling.   Musculoskeletal: Positive for arthralgias and back pain.       Objective   Physical Exam   Constitutional: He is oriented to person, place, and time. He appears well-developed and well-nourished.   Cardiovascular: Normal rate and regular rhythm.   Pulmonary/Chest: Effort normal and breath sounds normal.   Neurological: He is alert and oriented to person, place, and time.   Psychiatric: He has a normal mood and affect. His behavior is normal. Judgment and thought content normal.   Nursing note and vitals reviewed.      Assessment/Plan   Bryant was seen today for follow-up.    Diagnoses and all orders for this visit:    Mixed hyperlipidemia  -     Comprehensive Metabolic Panel  -     Lipid Panel    Need for immunization against influenza  -     Flucelvax Quad=>4Years (PFS)

## 2019-05-14 RX ORDER — PRAVASTATIN SODIUM 40 MG
TABLET ORAL
Qty: 90 TABLET | Refills: 0 | Status: SHIPPED | OUTPATIENT
Start: 2019-05-14 | End: 2019-07-22 | Stop reason: SDUPTHER

## 2019-07-22 ENCOUNTER — OFFICE VISIT (OUTPATIENT)
Dept: FAMILY MEDICINE CLINIC | Facility: CLINIC | Age: 59
End: 2019-07-22

## 2019-07-22 VITALS
OXYGEN SATURATION: 98 % | WEIGHT: 184.8 LBS | SYSTOLIC BLOOD PRESSURE: 136 MMHG | HEIGHT: 66 IN | TEMPERATURE: 98.7 F | HEART RATE: 57 BPM | BODY MASS INDEX: 29.7 KG/M2 | DIASTOLIC BLOOD PRESSURE: 85 MMHG

## 2019-07-22 DIAGNOSIS — Z12.5 SPECIAL SCREENING FOR MALIGNANT NEOPLASM OF PROSTATE: ICD-10-CM

## 2019-07-22 DIAGNOSIS — Z00.00 ANNUAL PHYSICAL EXAM: ICD-10-CM

## 2019-07-22 DIAGNOSIS — I10 HYPERTENSION, UNSPECIFIED TYPE: Primary | ICD-10-CM

## 2019-07-22 DIAGNOSIS — Z12.11 SCREENING FOR COLORECTAL CANCER: ICD-10-CM

## 2019-07-22 DIAGNOSIS — Z12.12 SCREENING FOR COLORECTAL CANCER: ICD-10-CM

## 2019-07-22 LAB
BILIRUB BLD-MCNC: NEGATIVE MG/DL
CLARITY, POC: CLEAR
COLOR UR: YELLOW
GLUCOSE UR STRIP-MCNC: NEGATIVE MG/DL
KETONES UR QL: NEGATIVE
LEUKOCYTE EST, POC: NEGATIVE
NITRITE UR-MCNC: NEGATIVE MG/ML
PH UR: 6 [PH] (ref 5–8)
PROT UR STRIP-MCNC: NEGATIVE MG/DL
RBC # UR STRIP: NEGATIVE /UL
SP GR UR: 1.01 (ref 1–1.03)
UROBILINOGEN UR QL: NORMAL

## 2019-07-22 PROCEDURE — G0439 PPPS, SUBSEQ VISIT: HCPCS | Performed by: FAMILY MEDICINE

## 2019-07-22 PROCEDURE — 81003 URINALYSIS AUTO W/O SCOPE: CPT | Performed by: FAMILY MEDICINE

## 2019-07-22 RX ORDER — OXYCODONE AND ACETAMINOPHEN 7.5; 325 MG/1; MG/1
1 TABLET ORAL EVERY 6 HOURS PRN
COMMUNITY

## 2019-07-22 RX ORDER — ERGOCALCIFEROL 1.25 MG/1
50000 CAPSULE ORAL WEEKLY
Qty: 8 CAPSULE | Refills: 0 | Status: SHIPPED | OUTPATIENT
Start: 2019-07-22 | End: 2020-02-12

## 2019-07-22 NOTE — PATIENT INSTRUCTIONS
Exercising to Stay Healthy  Exercising regularly is important. It has many health benefits, such as:  · Improving your overall fitness, flexibility, and endurance.  · Increasing your bone density.  · Helping with weight control.  · Decreasing your body fat.  · Increasing your muscle strength.  · Reducing stress and tension.  · Improving your overall health.    In order to become healthy and stay healthy, it is recommended that you do moderate-intensity and vigorous-intensity exercise. You can tell that you are exercising at a moderate intensity if you have a higher heart rate and faster breathing, but you are still able to hold a conversation. You can tell that you are exercising at a vigorous intensity if you are breathing much harder and faster and cannot hold a conversation while exercising.  How often should I exercise?  Choose an activity that you enjoy and set realistic goals. Your health care provider can help you to make an activity plan that works for you. Exercise regularly as directed by your health care provider. This may include:  · Doing resistance training twice each week, such as:  ? Push-ups.  ? Sit-ups.  ? Lifting weights.  ? Using resistance bands.  · Doing a given intensity of exercise for a given amount of time. Choose from these options:  ? 150 minutes of moderate-intensity exercise every week.  ? 75 minutes of vigorous-intensity exercise every week.  ? A mix of moderate-intensity and vigorous-intensity exercise every week.    Children, pregnant women, people who are out of shape, people who are overweight, and older adults may need to consult a health care provider for individual recommendations. If you have any sort of medical condition, be sure to consult your health care provider before starting a new exercise program.  What are some exercise ideas?  Some moderate-intensity exercise ideas include:  · Walking at a rate of 1 mile in 15  minutes.  · Biking.  · Hiking.  · Golfing.  · Dancing.    Some vigorous-intensity exercise ideas include:  · Walking at a rate of at least 4.5 miles per hour.  · Jogging or running at a rate of 5 miles per hour.  · Biking at a rate of at least 10 miles per hour.  · Lap swimming.  · Roller-skating or in-line skating.  · Cross-country skiing.  · Vigorous competitive sports, such as football, basketball, and soccer.  · Jumping rope.  · Aerobic dancing.    What are some everyday activities that can help me to get exercise?  · Yard work, such as:  ? Pushing a .  ? Raking and bagging leaves.  · Washing and waxing your car.  · Pushing a stroller.  · Shoveling snow.  · Gardening.  · Washing windows or floors.  How can I be more active in my day-to-day activities?  · Use the stairs instead of the elevator.  · Take a walk during your lunch break.  · If you drive, park your car farther away from work or school.  · If you take public transportation, get off one stop early and walk the rest of the way.  · Make all of your phone calls while standing up and walking around.  · Get up, stretch, and walk around every 30 minutes throughout the day.  What guidelines should I follow while exercising?  · Do not exercise so much that you hurt yourself, feel dizzy, or get very short of breath.  · Consult your health care provider before starting a new exercise program.  · Wear comfortable clothes and shoes with good support.  · Drink plenty of water while you exercise to prevent dehydration or heat stroke. Body water is lost during exercise and must be replaced.  · Work out until you breathe faster and your heart beats faster.  This information is not intended to replace advice given to you by your health care provider. Make sure you discuss any questions you have with your health care provider.  Document Released: 01/20/2012 Document Revised: 05/25/2017 Document Reviewed: 05/21/2015  [employer] Patient Education © 2018  [employer]      Fall Prevention in the Home, Adult  Falls can cause injuries and can affect people from all age groups. There are many simple things that you can do to make your home safe and to help prevent falls. Ask for help when making these changes, if needed.  What actions can I take to prevent falls?  General instructions  · Use good lighting in all rooms. Replace any light bulbs that burn out.  · Turn on lights if it is dark. Use night-lights.  · Place frequently used items in easy-to-reach places. Lower the shelves around your home if necessary.  · Set up furniture so that there are clear paths around it. Avoid moving your furniture around.  · Remove throw rugs and other tripping hazards from the floor.  · Avoid walking on wet floors.  · Fix any uneven floor surfaces.  · Add color or contrast paint or tape to grab bars and handrails in your home. Place contrasting color strips on the first and last steps of stairways.  · When you use a stepladder, make sure that it is completely opened and that the sides are firmly locked. Have someone hold the ladder while you are using it. Do not climb a closed stepladder.  · Be aware of any and all pets.  What can I do in the bathroom?  · Keep the floor dry. Immediately clean up any water that spills onto the floor.  · Remove soap buildup in the tub or shower on a regular basis.  · Use non-skid mats or decals on the floor of the tub or shower.  · Attach bath mats securely with double-sided, non-slip rug tape.  · If you need to sit down while you are in the shower, use a plastic, non-slip stool.  · Install grab bars by the toilet and in the tub and shower. Do not use towel bars as grab bars.  What can I do in the bedroom?  · Make sure that a bedside light is easy to reach.  · Do not use oversized bedding that drapes onto the floor.  · Have a firm chair that has side arms to use for getting dressed.  What can I do in the kitchen?  · Clean up any spills right away.  · If  you need to reach for something above you, use a sturdy step stool that has a grab bar.  · Keep electrical cables out of the way.  · Do not use floor polish or wax that makes floors slippery. If you must use wax, make sure that it is non-skid floor wax.  What can I do in the stairways?  · Do not leave any items on the stairs.  · Make sure that you have a light switch at the top of the stairs and the bottom of the stairs. Have them installed if you do not have them.  · Make sure that there are handrails on both sides of the stairs. Fix handrails that are broken or loose. Make sure that handrails are as long as the stairways.  · Install non-slip stair treads on all stairs in your home.  · Avoid having throw rugs at the top or bottom of stairways, or secure the rugs with carpet tape to prevent them from moving.  · Choose a carpet design that does not hide the edge of steps on the stairway.  · Check any carpeting to make sure that it is firmly attached to the stairs. Fix any carpet that is loose or worn.  What can I do on the outside of my home?  · Use bright outdoor lighting.  · Regularly repair the edges of walkways and driveways and fix any cracks.  · Remove high doorway thresholds.  · Trim any shrubbery on the main path into your home.  · Regularly check that handrails are securely fastened and in good repair. Both sides of any steps should have handrails.  · Install guardrails along the edges of any raised decks or porches.  · Clear walkways of debris and clutter, including tools and rocks.  · Have leaves, snow, and ice cleared regularly.  · Use sand or salt on walkways during winter months.  · In the garage, clean up any spills right away, including grease or oil spills.  What other actions can I take?  · Wear closed-toe shoes that fit well and support your feet. Wear shoes that have rubber soles or low heels.  · Use mobility aids as needed, such as canes, walkers, scooters, and crutches.  · Review your medicines  with your health care provider. Some medicines can cause dizziness or changes in blood pressure, which increase your risk of falling.  Talk with your health care provider about other ways that you can decrease your risk of falls. This may include working with a physical therapist or  to improve your strength, balance, and endurance.  Where to find more information  · Centers for Disease Control and Prevention, [employer]: https://www.cdc.gov  · National Center on Aging: https://fr0gjnt.kaylyn.nih.gov  Contact a health care provider if:  · You are afraid of falling at home.  · You feel weak, drowsy, or dizzy at home.  · You fall at home.  Summary  · There are many simple things that you can do to make your home safe and to help prevent falls.  · Ways to make your home safe include removing tripping hazards and installing grab bars in the bathroom.  · Ask for help when making these changes in your home.  This information is not intended to replace advice given to you by your health care provider. Make sure you discuss any questions you have with your health care provider.  Document Released: 2003 Document Revised: 2018 Document Reviewed: 2018  [employer] Interactive Patient Education © 2019 [employer].    Medicare Wellness  Personal Prevention Plan of Service     Date of Office Visit:  2019  Encounter Provider:  Bennett Villanueva MD  Place of Service:  [facility]  Patient Name: Bryant Delgado  :  1960    As part of the Medicare Wellness portion of your visit today, we are providing you with this personalized preventive plan of services (PPPS). This plan is based upon recommendations of the [place] Preventive Services Task Force (USPSTF) and the Advisory Committee on Immunization Practices (ACIP).    This lists the preventive care services that should be considered, and provides dates of when you are due. Items listed as completed are  up-to-date and do not require any further intervention.    Health Maintenance   Topic Date Due   • ZOSTER VACCINE (1 of 2) 07/22/2020 (Originally 2/24/2010)   • INFLUENZA VACCINE  08/01/2019   • LIPID PANEL  07/09/2020   • MEDICARE ANNUAL WELLNESS  07/22/2020   • COLONOSCOPY  06/27/2028   • HEPATITIS C SCREENING  Completed   • TDAP/TD VACCINES  Discontinued       Orders Placed This Encounter   Procedures   • Cologuard - Stool, Per Rectum     Standing Status:   Future     Standing Expiration Date:   7/22/2020   • POC Urinalysis Dipstick, Multipro       Return in 6 months (on 1/22/2020).

## 2019-07-22 NOTE — PROGRESS NOTES
The ABCs of the Annual Wellness Visit  Subsequent Medicare Wellness Visit    Chief Complaint   Patient presents with   • Medicare Wellness-subsequent       Subjective   History of Present Illness:  Bryant Delgado is a 59 y.o. male who presents for a Subsequent Medicare Wellness Visit.    HEALTH RISK ASSESSMENT    Recent Hospitalizations:  No hospitalization(s) within the last year.    Current Medical Providers:  Patient Care Team:  Bennett Villanueva MD as PCP - General (Family Medicine)  Bennett Villanueva MD as PCP - Claims Attributed    Smoking Status:  Social History     Tobacco Use   Smoking Status Never Smoker   Smokeless Tobacco Current User       Alcohol Consumption:  Social History     Substance and Sexual Activity   Alcohol Use No       Depression Screen:   PHQ-2/PHQ-9 Depression Screening 7/22/2019   Little interest or pleasure in doing things 0   Feeling down, depressed, or hopeless 0   Trouble falling or staying asleep, or sleeping too much -   Feeling tired or having little energy -   Poor appetite or overeating -   Feeling bad about yourself - or that you are a failure or have let yourself or your family down -   Trouble concentrating on things, such as reading the newspaper or watching television -   Moving or speaking so slowly that other people could have noticed. Or the opposite - being so fidgety or restless that you have been moving around a lot more than usual -   Thoughts that you would be better off dead, or of hurting yourself in some way -   Total Score 0   If you checked off any problems, how difficult have these problems made it for you to do your work, take care of things at home, or get along with other people? -       Fall Risk Screen:  STEADI Fall Risk Assessment was completed, and patient is at MODERATE risk for falls. Assessment completed on:7/22/2019    Health Habits and Functional and Cognitive Screening:  Functional & Cognitive Status 7/22/2019   Do you have  difficulty preparing food and eating? No   Do you have difficulty bathing yourself, getting dressed or grooming yourself? No   Do you have difficulty using the toilet? No   Do you have difficulty moving around from place to place? Yes   Do you have trouble with steps or getting out of a bed or a chair? Yes   Current Diet Frequent Junk Food   Dental Exam Up to date   Eye Exam Not up to date   Exercise (times per week) 3 times per week   Current Exercise Activities Include Weightlifting   Do you need help using the phone?  No   Are you deaf or do you have serious difficulty hearing?  No   Do you need help with transportation? No   Do you need help shopping? No   Do you need help preparing meals?  No   Do you need help with housework?  No   Do you need help with laundry? No   Do you need help taking your medications? No   Do you need help managing money? No   Do you ever drive or ride in a car without wearing a seat belt? No   Have you felt unusual stress, anger or loneliness in the last month? Yes   Who do you live with? Spouse   If you need help, do you have trouble finding someone available to you? No   Have you been bothered in the last four weeks by sexual problems? No   Do you have difficulty concentrating, remembering or making decisions? Yes         Does the patient have evidence of cognitive impairment? No    Asprin use counseling:Does not need ASA (and currently is not on it)    Age-appropriate Screening Schedule:  Refer to the list below for future screening recommendations based on patient's age, sex and/or medical conditions. Orders for these recommended tests are listed in the plan section. The patient has been provided with a written plan.    Health Maintenance   Topic Date Due   • ZOSTER VACCINE (1 of 2) 07/22/2020 (Originally 2/24/2010)   • INFLUENZA VACCINE  08/01/2019   • LIPID PANEL  07/09/2020   • COLONOSCOPY  06/27/2028   • TDAP/TD VACCINES  Discontinued          The following portions of the  "patient's history were reviewed and updated as appropriate: allergies, current medications, past family history, past medical history, past social history, past surgical history and problem list.    Outpatient Medications Prior to Visit   Medication Sig Dispense Refill   • cyclobenzaprine (FLEXERIL) 10 MG tablet      • gabapentin (NEURONTIN) 600 MG tablet TAKE 1 TABLET BY MOUTH four TIMES A DAY  2   • lisinopril (PRINIVIL,ZESTRIL) 20 MG tablet Take 1 tablet by mouth 2 (Two) Times a Day. 180 tablet 1   • oxyCODONE-acetaminophen (PERCOCET) 7.5-325 MG per tablet Take 1 tablet by mouth Every 6 (Six) Hours As Needed.     • pravastatin (PRAVACHOL) 40 MG tablet Take 1 tablet by mouth every night at bedtime. 90 tablet 1   • oxyCODONE-acetaminophen (PERCOCET)  MG per tablet TAKE 1 TABLET BY MOUTH 3 TIMES A DAY  0   • pravastatin (PRAVACHOL) 40 MG tablet TAKE 1 TABLET BY MOUTH AT BEDTIME 90 tablet 0     No facility-administered medications prior to visit.        There is no problem list on file for this patient.      Advanced Care Planning:  Patient does not have an advance directive - information provided to the patient today    Review of Systems   Cardiovascular: Negative for chest pain and leg swelling.   Musculoskeletal: Positive for arthralgias, back pain and neck pain.        Goes to pain management   All other systems reviewed and are negative.      Compared to one year ago, the patient feels his physical health is the same.  Compared to one year ago, the patient feels his mental health is the same.    Reviewed chart for potential of high risk medication in the elderly: yes  Reviewed chart for potential of harmful drug interactions in the elderly:yes    Objective         Vitals:    07/22/19 1331   BP: 136/85   BP Location: Right arm   Patient Position: Sitting   Cuff Size: Adult   Pulse: 57   Temp: 98.7 °F (37.1 °C)   TempSrc: Temporal   SpO2: 98%   Weight: 83.8 kg (184 lb 12.8 oz)   Height: 167.6 cm (65.98\") "   PainSc:   6   PainLoc: Back       Body mass index is 29.85 kg/m².  Discussed the patient's BMI with him. The BMI is above average; BMI management plan is completed.    Physical Exam   Constitutional: He is oriented to person, place, and time. He appears well-developed and well-nourished.   Overweight   HENT:   Right Ear: External ear normal.   Left Ear: External ear normal.   Mouth/Throat: Oropharynx is clear and moist.   Eyes: EOM are normal. Pupils are equal, round, and reactive to light.   Neck: No thyromegaly present.   Good carotid pulses   Cardiovascular: Normal rate and regular rhythm.   Pulmonary/Chest: Effort normal.   Abdominal: Soft. Bowel sounds are normal.   Genitourinary: Rectum normal, prostate normal and penis normal.   Genitourinary Comments: No testicular masses inguinal hernias or adenopathy-prostate 2+ no nodules   Musculoskeletal: He exhibits no edema.   Lymphadenopathy:     He has no cervical adenopathy.   Neurological: He is alert and oriented to person, place, and time.   Skin: Skin is warm and dry. Capillary refill takes less than 2 seconds.   Psychiatric: He has a normal mood and affect. His behavior is normal. Judgment and thought content normal.   Nursing note and vitals reviewed.      Lab Results   Component Value Date    GLU 91 07/09/2019    CHLPL 139 07/09/2019    TRIG 103 07/09/2019    HDL 44 07/09/2019    LDL 74 07/09/2019    VLDL 20.6 07/09/2019        Assessment/Plan   Medicare Risks and Personalized Health Plan  CMS Preventative Services Quick Reference  Colon Cancer Screening  Fall Risk  Prostate Cancer Screening     The above risks/problems have been discussed with the patient.  Pertinent information has been shared with the patient in the After Visit Summary.  Follow up plans and orders are seen below in the Assessment/Plan Section.    Diagnoses and all orders for this visit:    1. Hypertension, unspecified type (Primary)  -     POC Urinalysis Dipstick, Multipro    2.  Screening for colorectal cancer  -     Cologuard - Stool, Per Rectum; Future    3. Special screening for malignant neoplasm of prostate    4. Annual physical exam    Other orders  -     Zoster Vac Recomb Adjuvanted (SHINGRIX) 50 MCG/0.5ML reconstituted suspension; Inject 1 each into the appropriate muscle as directed by prescriber 1 (One) Time for 1 dose.  Dispense: 1 each; Refill: 1      Follow Up:  Return in 6 months (on 1/22/2020).     An After Visit Summary and PPPS were given to the patient.       Plan-above-vitamin D 50,000 units weekly x8 then level

## 2019-08-20 RX ORDER — LISINOPRIL 20 MG/1
TABLET ORAL
Qty: 180 TABLET | Refills: 3 | Status: SHIPPED | OUTPATIENT
Start: 2019-08-20 | End: 2020-01-03 | Stop reason: SDUPTHER

## 2019-08-20 RX ORDER — PRAVASTATIN SODIUM 40 MG
TABLET ORAL
Qty: 90 TABLET | Refills: 3 | Status: SHIPPED | OUTPATIENT
Start: 2019-08-20 | End: 2020-01-10 | Stop reason: SDUPTHER

## 2019-08-22 ENCOUNTER — RESULTS ENCOUNTER (OUTPATIENT)
Dept: FAMILY MEDICINE CLINIC | Facility: CLINIC | Age: 59
End: 2019-08-22

## 2019-08-22 DIAGNOSIS — Z12.11 SCREENING FOR COLORECTAL CANCER: ICD-10-CM

## 2019-08-22 DIAGNOSIS — Z12.12 SCREENING FOR COLORECTAL CANCER: ICD-10-CM

## 2019-10-30 ENCOUNTER — OFFICE VISIT (OUTPATIENT)
Dept: FAMILY MEDICINE CLINIC | Facility: CLINIC | Age: 59
End: 2019-10-30

## 2019-10-30 VITALS
DIASTOLIC BLOOD PRESSURE: 82 MMHG | BODY MASS INDEX: 28.77 KG/M2 | WEIGHT: 179 LBS | HEART RATE: 65 BPM | SYSTOLIC BLOOD PRESSURE: 124 MMHG | OXYGEN SATURATION: 98 % | TEMPERATURE: 98.4 F | HEIGHT: 66 IN

## 2019-10-30 DIAGNOSIS — R07.9 CHEST PAIN, UNSPECIFIED TYPE: Primary | ICD-10-CM

## 2019-10-30 PROCEDURE — 99213 OFFICE O/P EST LOW 20 MIN: CPT | Performed by: FAMILY MEDICINE

## 2019-10-30 RX ORDER — AZITHROMYCIN 250 MG/1
TABLET, FILM COATED ORAL
Qty: 6 TABLET | Refills: 0 | Status: SHIPPED | OUTPATIENT
Start: 2019-10-30 | End: 2020-01-03

## 2019-10-30 RX ORDER — CYANOCOBALAMIN (VITAMIN B-12) 500 MCG
1000 TABLET ORAL DAILY
COMMUNITY

## 2019-10-30 NOTE — PROGRESS NOTES
"Subjective   Bryant Delgado is a 59 y.o. male.     59-year-old with cough chest pain on left \"thinks he aspirated\"      Pain   This is a new problem. The current episode started in the past 7 days. The problem occurs daily. The problem has been waxing and waning. Associated symptoms comments: Left sided chest pain after extreme coughing-patient thinks he aspirated some soup. Nothing aggravates the symptoms. He has tried nothing for the symptoms.     Vitals:    10/30/19 1439   BP: 124/82   Pulse: 65   Temp: 98.4 °F (36.9 °C)   SpO2: 98%       The following portions of the patient's history were reviewed and updated as appropriate: allergies, current medications, past family history, past medical history, past social history, past surgical history and problem list.    Review of Systems   Respiratory:        Left-sided chest pain after coughing and possible aspiration   Gastrointestinal:        Recent gastroenteritis       Objective   Physical Exam   Constitutional: He is oriented to person, place, and time.   Slightly overweight   HENT:   Right Ear: External ear normal.   Left Ear: External ear normal.   Mouth/Throat: Oropharynx is clear and moist.   Cardiovascular: Normal rate and regular rhythm.   Pulmonary/Chest: Effort normal and breath sounds normal. He has no wheezes. He has no rales.   Musculoskeletal: He exhibits no edema.   Neurological: He is alert and oriented to person, place, and time.   Psychiatric: He has a normal mood and affect. His behavior is normal. Judgment and thought content normal.   Nursing note and vitals reviewed.      Patient's Body mass index is 28.91 kg/m². BMI is above normal parameters. Recommendations include: exercise counseling.      Assessment/Plan   There is no problem list on file for this patient.    Bryant was seen today for uri.    Diagnoses and all orders for this visit:    Chest pain, unspecified type  -     XR Chest PA & Lateral; Future  -     XR Chest PA & Lateral; " Future    Other orders  -     azithromycin (ZITHROMAX Z-CORONA) 250 MG tablet; Take 2 tablets the first day, then 1 tablet daily for 4 days.       Return if symptoms worsen or fail to improve, for Next scheduled follow up.       Plan chest x-ray plus above and Mucinex      Electronically signed by Bennett Villanueva MD 10/30/2019

## 2019-11-12 RX ORDER — AMOXICILLIN 500 MG/1
500 CAPSULE ORAL 3 TIMES DAILY
Qty: 21 CAPSULE | Refills: 0 | Status: SHIPPED | OUTPATIENT
Start: 2019-11-12 | End: 2020-01-03

## 2019-11-12 NOTE — TELEPHONE ENCOUNTER
PT STATES FINISHED ZPACK-SINUS DRAINAGE-THICK.  STATES HE HAS BEEN SITTING OUTSIDE DEER HUNTING.    REQUESTING MEDS      [facility]

## 2020-01-03 ENCOUNTER — OFFICE VISIT (OUTPATIENT)
Dept: FAMILY MEDICINE CLINIC | Facility: CLINIC | Age: 60
End: 2020-01-03

## 2020-01-03 VITALS
OXYGEN SATURATION: 98 % | WEIGHT: 177.8 LBS | SYSTOLIC BLOOD PRESSURE: 115 MMHG | HEART RATE: 81 BPM | TEMPERATURE: 97.6 F | BODY MASS INDEX: 28.72 KG/M2 | DIASTOLIC BLOOD PRESSURE: 71 MMHG

## 2020-01-03 DIAGNOSIS — R44.0 AUDITORY HALLUCINATIONS: Primary | ICD-10-CM

## 2020-01-03 PROCEDURE — 99214 OFFICE O/P EST MOD 30 MIN: CPT | Performed by: NURSE PRACTITIONER

## 2020-01-03 RX ORDER — QUETIAPINE FUMARATE 50 MG/1
50 TABLET, FILM COATED ORAL NIGHTLY
Qty: 30 TABLET | Refills: 0 | Status: SHIPPED | OUTPATIENT
Start: 2020-01-03 | End: 2020-01-10 | Stop reason: SDUPTHER

## 2020-01-03 NOTE — PROGRESS NOTES
"CC: buzzing in ears    History:  Bryant Delgado is a 59 y.o. male who presents today for evaluation of the above problems.      Has been hearing people saying his name though electric devices in his house for 3 months. Has also heard in his vehicle driving at times. States that the voices only say his name, or \"Hey!\"  They do not tell him to hurt himself or others.  He admits that no one else is able to hear the same thing he is hearing. Is unable to sleep because the voices are keeping him awake. Believes that his neighbors are using microphones and causing this to run him out of his house.   Has taken Wellbutrin, Sertraline previously. Denies any history of bipolar disorder or any history of hearing voices before this.    HPI  ROS:  Review of Systems   Constitutional: Negative for fever.   HENT: Positive for tinnitus.    Psychiatric/Behavioral: Positive for hallucinations and sleep disturbance.       Allergies   Allergen Reactions   • Keflex [Cephalexin] Itching and Swelling     Past Medical History:   Diagnosis Date   • Hyperlipidemia    • Hypertension      Past Surgical History:   Procedure Laterality Date   • CERVICAL FUSION     • LUMBAR LAMINECTOMY     • VASECTOMY       No family history on file.   reports that he has never smoked. He uses smokeless tobacco. He reports that he does not drink alcohol or use drugs.      Current Outpatient Medications:   •  Cyanocobalamin (VITAMIN B 12) 500 MCG tablet, Take 1,000 mcg by mouth Daily., Disp: , Rfl:   •  cyclobenzaprine (FLEXERIL) 10 MG tablet, , Disp: , Rfl:   •  gabapentin (NEURONTIN) 600 MG tablet, TAKE 1 TABLET BY MOUTH four TIMES A DAY, Disp: , Rfl: 2  •  lisinopril (PRINIVIL,ZESTRIL) 20 MG tablet, Take 1 tablet by mouth 2 (Two) Times a Day., Disp: 180 tablet, Rfl: 1  •  oxyCODONE-acetaminophen (PERCOCET) 7.5-325 MG per tablet, Take 1 tablet by mouth Every 6 (Six) Hours As Needed., Disp: , Rfl:   •  pravastatin (PRAVACHOL) 40 MG tablet, Take 1 tablet by mouth " every night at bedtime., Disp: 90 tablet, Rfl: 1  •  pravastatin (PRAVACHOL) 40 MG tablet, TAKE 1 TABLET BY MOUTH AT BEDTIME, Disp: 90 tablet, Rfl: 3  •  vitamin D (ERGOCALCIFEROL) 87187 units capsule capsule, Take 1 capsule by mouth 1 (One) Time Per Week., Disp: 8 capsule, Rfl: 0  •  QUEtiapine (SEROQUEL) 50 MG tablet, Take 1 tablet by mouth Every Night., Disp: 30 tablet, Rfl: 0    OBJECTIVE:  /71 (BP Location: Left arm, Patient Position: Sitting, Cuff Size: Large Adult)   Pulse 81   Temp 97.6 °F (36.4 °C) (Temporal)   Wt 80.6 kg (177 lb 12.8 oz)   SpO2 98%   BMI 28.72 kg/m²    Physical Exam   Constitutional: He is oriented to person, place, and time. Vital signs are normal. He appears well-developed and well-nourished.   HENT:   Right Ear: Tympanic membrane, external ear and ear canal normal.   Left Ear: Tympanic membrane, external ear and ear canal normal.   Mouth/Throat: Oropharynx is clear and moist.   Eyes: Pupils are equal, round, and reactive to light.   Cardiovascular: Normal rate.   Pulmonary/Chest: Effort normal.   Neurological: He is alert and oriented to person, place, and time.   Psychiatric: He has a normal mood and affect. His behavior is normal.   Vitals reviewed.      Assessment/Plan    Bryant was seen today for tinnitus.    Diagnoses and all orders for this visit:    Auditory hallucinations  -     Cancel: MRI Brain Without Contrast; Future  -     CBC & Differential  -     Comprehensive Metabolic Panel  -     QUEtiapine (SEROQUEL) 50 MG tablet; Take 1 tablet by mouth Every Night.  -     ToxASSURE Select 13 (MW) - Urine, Clean Catch  -     CT Head Without Contrast; Future    Patient believes that he has a steel plate in his neck.  Because of this I have ordered a CT versus an MRI.  Will evaluate urine drug screen and blood work in addition to this.  Has been on antidepressants in the past, however, denies any history of hallucinations.  I did discuss with patient that I felt that he was  "hearing things that no one else heard and that if the voices did start to tell him to hurt himself or others he should go to the emergency room.  I did not want to agitate the patient so I did not specifically tell him that the voices were not real.  I did however advise the patient that I did not think his neighbors were using microphones and that he should avoid any contact with these neighbors.  He also thought that perhaps his \"nerve damage\" which he takes gabapentin for was causing him to have increased hearing abilities and be able to hear things that other people could not.  I did try to reinforce reality that if he had nerve damage if anything this would decrease his hearing cannot amplify it.    Start Seroquel to help with hallucinations, however, with no history of this feel that we should rule out pathologic causes before addressing as merely a psych issue.        An After Visit Summary was printed and given to the patient at discharge.  Return in about 1 week (around 1/10/2020).       ALEJANDRO Lora 01/03/20    Electronically signed.  "

## 2020-01-04 LAB
ALBUMIN SERPL-MCNC: 4.5 G/DL (ref 3.5–5.2)
ALBUMIN/GLOB SERPL: 1.8 G/DL
ALP SERPL-CCNC: 58 U/L (ref 39–117)
ALT SERPL-CCNC: 10 U/L (ref 1–41)
AST SERPL-CCNC: 16 U/L (ref 1–40)
BASOPHILS # BLD AUTO: 0.03 10*3/MM3 (ref 0–0.2)
BASOPHILS NFR BLD AUTO: 0.4 % (ref 0–1.5)
BILIRUB SERPL-MCNC: 0.3 MG/DL (ref 0.2–1.2)
BUN SERPL-MCNC: 7 MG/DL (ref 6–20)
BUN/CREAT SERPL: 7.5 (ref 7–25)
CALCIUM SERPL-MCNC: 9.8 MG/DL (ref 8.6–10.5)
CHLORIDE SERPL-SCNC: 101 MMOL/L (ref 98–107)
CO2 SERPL-SCNC: 25.3 MMOL/L (ref 22–29)
CREAT SERPL-MCNC: 0.93 MG/DL (ref 0.76–1.27)
EOSINOPHIL # BLD AUTO: 0.14 10*3/MM3 (ref 0–0.4)
EOSINOPHIL NFR BLD AUTO: 1.7 % (ref 0.3–6.2)
ERYTHROCYTE [DISTWIDTH] IN BLOOD BY AUTOMATED COUNT: 12.1 % (ref 12.3–15.4)
GLOBULIN SER CALC-MCNC: 2.5 GM/DL
GLUCOSE SERPL-MCNC: 114 MG/DL (ref 65–99)
HCT VFR BLD AUTO: 42.2 % (ref 37.5–51)
HGB BLD-MCNC: 14.5 G/DL (ref 13–17.7)
IMM GRANULOCYTES # BLD AUTO: 0.03 10*3/MM3 (ref 0–0.05)
IMM GRANULOCYTES NFR BLD AUTO: 0.4 % (ref 0–0.5)
LYMPHOCYTES # BLD AUTO: 1.71 10*3/MM3 (ref 0.7–3.1)
LYMPHOCYTES NFR BLD AUTO: 20.9 % (ref 19.6–45.3)
MCH RBC QN AUTO: 30.8 PG (ref 26.6–33)
MCHC RBC AUTO-ENTMCNC: 34.4 G/DL (ref 31.5–35.7)
MCV RBC AUTO: 89.6 FL (ref 79–97)
MONOCYTES # BLD AUTO: 0.79 10*3/MM3 (ref 0.1–0.9)
MONOCYTES NFR BLD AUTO: 9.6 % (ref 5–12)
NEUTROPHILS # BLD AUTO: 5.49 10*3/MM3 (ref 1.7–7)
NEUTROPHILS NFR BLD AUTO: 67 % (ref 42.7–76)
NRBC BLD AUTO-RTO: 0 /100 WBC (ref 0–0.2)
PLATELET # BLD AUTO: 257 10*3/MM3 (ref 140–450)
POTASSIUM SERPL-SCNC: 4.6 MMOL/L (ref 3.5–5.2)
PROT SERPL-MCNC: 7 G/DL (ref 6–8.5)
RBC # BLD AUTO: 4.71 10*6/MM3 (ref 4.14–5.8)
SODIUM SERPL-SCNC: 139 MMOL/L (ref 136–145)
WBC # BLD AUTO: 8.19 10*3/MM3 (ref 3.4–10.8)

## 2020-01-07 LAB — DRUGS UR: NORMAL

## 2020-01-10 ENCOUNTER — OFFICE VISIT (OUTPATIENT)
Dept: FAMILY MEDICINE CLINIC | Facility: CLINIC | Age: 60
End: 2020-01-10

## 2020-01-10 VITALS
TEMPERATURE: 98.5 F | OXYGEN SATURATION: 99 % | HEART RATE: 67 BPM | BODY MASS INDEX: 28.68 KG/M2 | DIASTOLIC BLOOD PRESSURE: 79 MMHG | WEIGHT: 177.6 LBS | SYSTOLIC BLOOD PRESSURE: 121 MMHG

## 2020-01-10 DIAGNOSIS — R44.0 AUDITORY HALLUCINATIONS: ICD-10-CM

## 2020-01-10 DIAGNOSIS — E55.9 VITAMIN D DEFICIENCY: ICD-10-CM

## 2020-01-10 DIAGNOSIS — E53.8 B12 DEFICIENCY: Primary | ICD-10-CM

## 2020-01-10 DIAGNOSIS — Z13.220 LIPID SCREENING: ICD-10-CM

## 2020-01-10 PROCEDURE — 99214 OFFICE O/P EST MOD 30 MIN: CPT | Performed by: NURSE PRACTITIONER

## 2020-01-10 RX ORDER — QUETIAPINE FUMARATE 50 MG/1
TABLET, FILM COATED ORAL
Qty: 30 TABLET | Refills: 0
Start: 2020-01-10 | End: 2020-04-15

## 2020-01-10 NOTE — PROGRESS NOTES
CC: follow up auditory hallucinations    History:  Bryant Delgado is a 59 y.o. male who presents today for evaluation of the above problems.      Bryant is here today for follow-up on his auditory hallucinations.  We have done an MRI of his brain which was normal.  CBC and CMP were unremarkable.  1 week ago I started Bryant on Seroquel 50 mg nightly.  He states that this did help relieve the voices and noises that he has been hearing and help him be able to sleep.  He notes that as the morning progresses and the medication wears off he will again start hearing voices and noises through the motors of his electrical appliances. In addition to this, he states that the motors seem to bog down at times on the   Bryant states that they did contact the electric company.  Had testing done on lines and the company agreed that there was interference in their lines, however, stated that it was not on there and that the interference was coming from an individual.  We did get his wife on the phone during the visit today.  Bryant's case is complicated by the fact that she also agrees that their neighbors may be doing something to cause noises.  She admits that she cannot hear these noises, however, states that she does not hear high frequencies.  She does admit that she has been having frequent headaches.  They have contacted the state place regarding their complaints.  The state police have advised that Bryant be medically a evaluated.  In addition to this Bryant is taking 700 mg of gabapentin 3 times a day as well as Percocet 7.5 four times a day.  He states that his gabapentin was increased from 600 mg to 700 mg approximately 4 months ago.  He also states that his Percocet dose was changed from 10 mg 3 times a day to the 7.5 mg 4 times a day about a year ago.    HPI  ROS:  Review of Systems   Neurological: Positive for headaches.   Psychiatric/Behavioral: Positive for hallucinations (Auditory).       Allergies   Allergen Reactions   •  Keflex [Cephalexin] Itching and Swelling     Past Medical History:   Diagnosis Date   • Hyperlipidemia    • Hypertension      Past Surgical History:   Procedure Laterality Date   • CERVICAL FUSION     • LUMBAR LAMINECTOMY     • VASECTOMY       History reviewed. No pertinent family history.   reports that he has never smoked. He uses smokeless tobacco. He reports that he does not drink alcohol or use drugs.      Current Outpatient Medications:   •  Cyanocobalamin (VITAMIN B 12) 500 MCG tablet, Take 1,000 mcg by mouth Daily., Disp: , Rfl:   •  gabapentin (NEURONTIN) 600 MG tablet, TAKE 1 TABLET BY MOUTH four TIMES A DAY, Disp: , Rfl: 2  •  lisinopril (PRINIVIL,ZESTRIL) 20 MG tablet, Take 1 tablet by mouth 2 (Two) Times a Day., Disp: 180 tablet, Rfl: 1  •  oxyCODONE-acetaminophen (PERCOCET) 7.5-325 MG per tablet, Take 1 tablet by mouth Every 6 (Six) Hours As Needed., Disp: , Rfl:   •  pravastatin (PRAVACHOL) 40 MG tablet, Take 1 tablet by mouth every night at bedtime., Disp: 90 tablet, Rfl: 1  •  QUEtiapine (SEROQUEL) 50 MG tablet, Take 1/2 tablet in the AM and 1 tablet in the PM, Disp: 30 tablet, Rfl: 0  •  vitamin D (ERGOCALCIFEROL) 43642 units capsule capsule, Take 1 capsule by mouth 1 (One) Time Per Week., Disp: 8 capsule, Rfl: 0  •  cyclobenzaprine (FLEXERIL) 10 MG tablet, , Disp: , Rfl:     OBJECTIVE:  /79 (BP Location: Left arm, Patient Position: Sitting, Cuff Size: Adult)   Pulse 67   Temp 98.5 °F (36.9 °C) (Oral)   Wt 80.6 kg (177 lb 9.6 oz)   SpO2 99%   BMI 28.68 kg/m²    Physical Exam   Constitutional: He is oriented to person, place, and time. Vital signs are normal. He appears well-developed and well-nourished.   Cardiovascular: Normal rate.   Pulmonary/Chest: Effort normal.   Neurological: He is alert and oriented to person, place, and time.   Psychiatric: He has a normal mood and affect. His speech is normal and behavior is normal. Thought content is paranoid (towards neighbors).   Vitals  reviewed.      Assessment/Plan    Bryant was seen today for follow-up.    Diagnoses and all orders for this visit:    B12 deficiency  -     Vitamin B12    Vitamin D deficiency  -     Vitamin D 25 Hydroxy    Lipid screening  -     Lipid Panel    Auditory hallucinations  -     QUEtiapine (SEROQUEL) 50 MG tablet; Take 1/2 tablet in the AM and 1 tablet in the PM    Discussed possible environmental hazards - ie. Well water, carbon monoxide poisoning that could possible be affecting Bryant as well as his wife (headaches.)    [facility] returned my phone call.  Spoke with Dr. Sinclair's PA, Kishan Chavez.  He stated that have they have never had any issue with Mr. Delgado and that he has been a model patient from their standpoint.  He agreed that gabapentin can at times cause some mood alteration, however, he has not seen it cause hallucinations.  He recommended that we decrease the gabapentin to 600 mg 3 times a day to see if this improves anything for him.      An After Visit Summary was printed and given to the patient at discharge.  Return in about 1 week (around 1/17/2020).       Sangeeta Vaz, ALEJANDRO 1/10/20    Electronically signed.

## 2020-01-11 LAB
25(OH)D3+25(OH)D2 SERPL-MCNC: 44.8 NG/ML (ref 30–100)
CHOLEST SERPL-MCNC: 158 MG/DL (ref 0–200)
HDLC SERPL-MCNC: 50 MG/DL (ref 40–60)
LDLC SERPL CALC-MCNC: 97 MG/DL (ref 0–100)
TRIGL SERPL-MCNC: 55 MG/DL (ref 0–150)
VIT B12 SERPL-MCNC: 758 PG/ML (ref 211–946)
VLDLC SERPL CALC-MCNC: 11 MG/DL

## 2020-01-17 ENCOUNTER — OFFICE VISIT (OUTPATIENT)
Dept: FAMILY MEDICINE CLINIC | Facility: CLINIC | Age: 60
End: 2020-01-17

## 2020-01-17 VITALS
OXYGEN SATURATION: 99 % | WEIGHT: 178.4 LBS | HEART RATE: 68 BPM | BODY MASS INDEX: 28.81 KG/M2 | SYSTOLIC BLOOD PRESSURE: 137 MMHG | DIASTOLIC BLOOD PRESSURE: 86 MMHG | TEMPERATURE: 97.5 F

## 2020-01-17 DIAGNOSIS — R44.3 HALLUCINATIONS: Primary | ICD-10-CM

## 2020-01-17 PROCEDURE — 99213 OFFICE O/P EST LOW 20 MIN: CPT | Performed by: FAMILY MEDICINE

## 2020-01-17 NOTE — PROGRESS NOTES
Subjective   Bryant Delgado is a 59 y.o. male.     59-year-old male with auditory hallucinations    Anxiety   Presents for follow-up visit. Symptoms include nervous/anxious behavior and obsessions. Primary symptoms comment: Auditory hallucinations. Symptoms occur most days. The severity of symptoms is severe. The quality of sleep is poor. Nighttime awakenings: several.     Side effects of treatment include auditory problems.       The following portions of the patient's history were reviewed and updated as appropriate: allergies, current medications, past family history, past medical history, past social history, past surgical history and problem list.    Review of Systems   Psychiatric/Behavioral: Positive for agitation and hallucinations. Negative for behavioral problems. The patient is nervous/anxious.        Objective   Physical Exam   Constitutional: He is oriented to person, place, and time.   Manageable calm   Neurological: He is alert and oriented to person, place, and time.   Psychiatric: He has a normal mood and affect. His behavior is normal.   Thought content basically normal but consistent auditory hallucinations   Nursing note and vitals reviewed.      Assessment/Plan   Bryant was seen today for follow-up.    Diagnoses and all orders for this visit:    Hallucinations       Plan psychiatric consult.  [facility]--appointment February 3

## 2020-02-05 ENCOUNTER — TELEPHONE (OUTPATIENT)
Dept: FAMILY MEDICINE CLINIC | Facility: CLINIC | Age: 60
End: 2020-02-05

## 2020-02-05 NOTE — TELEPHONE ENCOUNTER
FYI:  PT STATES HE SAW TELMA @ [facility].  SHE RECOMMENDED NEEDING A REFERRAL TO NEURO-PT IS REQUESTING DR SHAYNA ISABEL.  PT IS AWAITING A CALL FROM THE THERAPIST.\ SO SHE CAN FAX OFFICE NOTES.

## 2020-02-12 ENCOUNTER — OFFICE VISIT (OUTPATIENT)
Dept: FAMILY MEDICINE CLINIC | Facility: CLINIC | Age: 60
End: 2020-02-12

## 2020-02-12 VITALS
WEIGHT: 179.2 LBS | SYSTOLIC BLOOD PRESSURE: 115 MMHG | BODY MASS INDEX: 28.8 KG/M2 | DIASTOLIC BLOOD PRESSURE: 75 MMHG | OXYGEN SATURATION: 99 % | HEART RATE: 67 BPM | TEMPERATURE: 97.3 F | HEIGHT: 66 IN

## 2020-02-12 DIAGNOSIS — E55.9 VITAMIN D DEFICIENCY: ICD-10-CM

## 2020-02-12 DIAGNOSIS — E78.2 MIXED HYPERLIPIDEMIA: Primary | ICD-10-CM

## 2020-02-12 DIAGNOSIS — R53.83 FATIGUE, UNSPECIFIED TYPE: ICD-10-CM

## 2020-02-12 DIAGNOSIS — R44.0 AUDITORY HALLUCINATIONS: ICD-10-CM

## 2020-02-12 DIAGNOSIS — E03.9 ACQUIRED HYPOTHYROIDISM: ICD-10-CM

## 2020-02-12 DIAGNOSIS — F41.9 ANXIETY: ICD-10-CM

## 2020-02-12 PROCEDURE — 99214 OFFICE O/P EST MOD 30 MIN: CPT | Performed by: FAMILY MEDICINE

## 2020-02-12 NOTE — PROGRESS NOTES
Subjective   Bryant Delgado is a 59 y.o. male.     59-year-old male with possible toxic induced hallucinations    Anxiety   Presents for follow-up visit. Symptoms include decreased concentration, excessive worry, insomnia and nervous/anxious behavior. Primary symptoms comment: Mental health evaluation doubts mental health disease. Symptoms occur most days. The severity of symptoms is moderate. The quality of sleep is fair. Nighttime awakenings: occasional.         Vitals:    02/12/20 1305   BP: 115/75   Pulse: 67   Temp: 97.3 °F (36.3 °C)   SpO2: 99%       The following portions of the patient's history were reviewed and updated as appropriate: allergies, current medications, past family history, past medical history, past social history, past surgical history and problem list.    Review of Systems   Constitutional:        Has well water possible contamination, electrical substation nearby   Musculoskeletal: Positive for arthralgias and back pain.        Sees pain management for supervised Percocet and gabapentin treatment   Neurological: Positive for headaches.   Psychiatric/Behavioral: Positive for decreased concentration. The patient is nervous/anxious and has insomnia.         Mental health evaluation feels that there is organicity in his problem       Objective   Physical Exam   Constitutional: He is oriented to person, place, and time.   HENT:   Right Ear: External ear normal.   Left Ear: External ear normal.   Mouth/Throat: Oropharynx is clear and moist.   Eyes: Pupils are equal, round, and reactive to light. EOM are normal.   Neck: No thyromegaly present.   Good carotid pulses   Cardiovascular: Normal rate and regular rhythm.   Pulmonary/Chest: Effort normal and breath sounds normal.   Abdominal: Soft. Bowel sounds are normal. He exhibits no mass. There is no guarding.   Musculoskeletal: He exhibits no edema.   Lymphadenopathy:     He has no cervical adenopathy.   Neurological: He is alert and oriented to  person, place, and time.   No neurologic focality   Skin: Skin is warm and dry. No rash noted.   Psychiatric: He has a normal mood and affect. His behavior is normal. Judgment and thought content normal.   -Serial sevens without difficulty   Nursing note and vitals reviewed.      Patient's Body mass index is 28.94 kg/m². BMI is above normal parameters. Recommendations include: none (medical contraindication).      Assessment/Plan   There is no problem list on file for this patient.    Bryant was seen today for consult and consult.    Diagnoses and all orders for this visit:    Mixed hyperlipidemia  -     Comprehensive Metabolic Panel    Acquired hypothyroidism  -     TSH  -     T4, free  -     T3, Free    Fatigue, unspecified type  -     CBC & Differential    Vitamin D deficiency  -     Vitamin D 25 Hydroxy    Anxiety    Auditory hallucinations  -     Ambulatory Referral to Neurology       Return if symptoms worsen or fail to improve, for Next scheduled follow up.       Plan-complete the mental health evaluation and  neuro evaluation then see me      Electronically signed by Bennett Villanueva MD 02/12/2020

## 2020-02-13 ENCOUNTER — TELEPHONE (OUTPATIENT)
Dept: FAMILY MEDICINE CLINIC | Facility: CLINIC | Age: 60
End: 2020-02-13

## 2020-02-13 LAB
25(OH)D3+25(OH)D2 SERPL-MCNC: 36.4 NG/ML (ref 30–100)
ALBUMIN SERPL-MCNC: 4.3 G/DL (ref 3.5–5.2)
ALBUMIN/GLOB SERPL: 2 G/DL
ALP SERPL-CCNC: 53 U/L (ref 39–117)
ALT SERPL-CCNC: 10 U/L (ref 1–41)
AST SERPL-CCNC: 14 U/L (ref 1–40)
BASOPHILS # BLD AUTO: 0.03 10*3/MM3 (ref 0–0.2)
BASOPHILS NFR BLD AUTO: 0.5 % (ref 0–1.5)
BILIRUB SERPL-MCNC: 0.4 MG/DL (ref 0.2–1.2)
BUN SERPL-MCNC: 12 MG/DL (ref 6–20)
BUN/CREAT SERPL: 11.9 (ref 7–25)
CALCIUM SERPL-MCNC: 9.5 MG/DL (ref 8.6–10.5)
CHLORIDE SERPL-SCNC: 103 MMOL/L (ref 98–107)
CO2 SERPL-SCNC: 24.8 MMOL/L (ref 22–29)
CREAT SERPL-MCNC: 1.01 MG/DL (ref 0.76–1.27)
EOSINOPHIL # BLD AUTO: 0.08 10*3/MM3 (ref 0–0.4)
EOSINOPHIL NFR BLD AUTO: 1.4 % (ref 0.3–6.2)
ERYTHROCYTE [DISTWIDTH] IN BLOOD BY AUTOMATED COUNT: 12.7 % (ref 12.3–15.4)
GLOBULIN SER CALC-MCNC: 2.2 GM/DL
GLUCOSE SERPL-MCNC: 99 MG/DL (ref 65–99)
HCT VFR BLD AUTO: 41.7 % (ref 37.5–51)
HGB BLD-MCNC: 14.2 G/DL (ref 13–17.7)
IMM GRANULOCYTES # BLD AUTO: 0.01 10*3/MM3 (ref 0–0.05)
IMM GRANULOCYTES NFR BLD AUTO: 0.2 % (ref 0–0.5)
LYMPHOCYTES # BLD AUTO: 1.44 10*3/MM3 (ref 0.7–3.1)
LYMPHOCYTES NFR BLD AUTO: 25.8 % (ref 19.6–45.3)
MCH RBC QN AUTO: 31.2 PG (ref 26.6–33)
MCHC RBC AUTO-ENTMCNC: 34.1 G/DL (ref 31.5–35.7)
MCV RBC AUTO: 91.6 FL (ref 79–97)
MONOCYTES # BLD AUTO: 0.64 10*3/MM3 (ref 0.1–0.9)
MONOCYTES NFR BLD AUTO: 11.5 % (ref 5–12)
NEUTROPHILS # BLD AUTO: 3.38 10*3/MM3 (ref 1.7–7)
NEUTROPHILS NFR BLD AUTO: 60.6 % (ref 42.7–76)
NRBC BLD AUTO-RTO: 0 /100 WBC (ref 0–0.2)
PLATELET # BLD AUTO: 245 10*3/MM3 (ref 140–450)
POTASSIUM SERPL-SCNC: 5.4 MMOL/L (ref 3.5–5.2)
PROT SERPL-MCNC: 6.5 G/DL (ref 6–8.5)
RBC # BLD AUTO: 4.55 10*6/MM3 (ref 4.14–5.8)
SODIUM SERPL-SCNC: 139 MMOL/L (ref 136–145)
T3FREE SERPL-MCNC: 4 PG/ML (ref 2–4.4)
T4 FREE SERPL-MCNC: 1.06 NG/DL (ref 0.93–1.7)
TSH SERPL DL<=0.005 MIU/L-ACNC: 1 UIU/ML (ref 0.27–4.2)
WBC # BLD AUTO: 5.58 10*3/MM3 (ref 3.4–10.8)

## 2020-02-13 NOTE — TELEPHONE ENCOUNTER
Patient called this afternoon and spoke with this provider.  States that during the time he was away from home for his office visit yesterday he was fine and had no adverse symptoms.  He states that when he got about 6 miles from his house he was able to start hearing the buzzing sounds that he has previously described.  He states that when he got approximately 5 miles from his house he started having burning of his skin.  After getting home the burning worsened.  He states that by last night he was hardly able to wash his face due to the burning behind his ears.  States that he was able to feel some tiny bumps on the surface and the skin seems dry today.  He states that he wanted us to be aware of this because the symptoms started without any contact to his well water.  Also he does not feel that the symptoms could be related to the electrical substation because other neighbors are not affected.      Also is concerned because feels that his appointments with psychiatry and neurology are not soon enough.  Advised patient that we would contact both offices and see if his appointments could be made any sooner however this is truly out of our hands.    In the middle of discussion Bryant mentioned having some chest pain last night.  As he was talking about multiple things, I failed to address this further with him during the phone call.  After ending the call I realized that we had not discussed this further and I attempted to call him back.  Was unable to reach him, so I phoned his wife and advised that if has chest pain he needs to go to ER for evaluation, especially since there is the possibility that there is some environmental or other agent in his home causing him harm. Wife voices understanding and will advise patient of this.

## 2020-02-19 ENCOUNTER — TELEPHONE (OUTPATIENT)
Dept: FAMILY MEDICINE CLINIC | Facility: CLINIC | Age: 60
End: 2020-02-19

## 2020-02-19 NOTE — TELEPHONE ENCOUNTER
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 Patient was advised to keep neurology and mental health appointments and keep us informed of any updates.

## 2020-02-28 ENCOUNTER — TELEPHONE (OUTPATIENT)
Dept: FAMILY MEDICINE CLINIC | Facility: CLINIC | Age: 60
End: 2020-02-28

## 2020-02-28 NOTE — TELEPHONE ENCOUNTER
PT C/O FLUID LEFT EAR      SPOKE WITH JOYCE-HAVE PT USE ZYRTEC AND FLONASE. IF NOT BETTER IN 1 WK-CALL OFFICE FOR APPT. VERBALIZED UNDERSTANDING.

## 2020-04-02 ENCOUNTER — TELEPHONE (OUTPATIENT)
Dept: FAMILY MEDICINE CLINIC | Facility: CLINIC | Age: 60
End: 2020-04-02

## 2020-04-02 NOTE — TELEPHONE ENCOUNTER
Dania -   I spoke with neurologist on the phone. Bryant has not been able to follow up in their office due to COVID19.  He wants us to let Bryant know that he can get a 3 day EEG set up for him which may provide more information than the EEG he has already since it records over a longer period of time. If he wants to have this he needs to contact neurology.     Dr. Villanueva, what do you want to do about letter he is wanting?  ANAM - Neurologist also said his pet scan should be repeated in 1 year.

## 2020-04-02 NOTE — TELEPHONE ENCOUNTER
PT STATES HE HAD TESTS COMPLETED LAST WEEK-EEG IN [place] & TEST FOR DEMENTIA @ [facility]-ASKED FOR COPY OF REPORTS-INFORMED WE COULD ONLY VIEW THEM.     PT WOULD LIKE YOU TO REVIEW TEST AND WRITE A STATEMENT SIMILAR TO NOTE FROM MENTAL HEALTH LETTER PT PROVIDED-SCANNED INTO CHART.        AFTER SPEAKING WITH JOYCE, PT WAS INFORMED WOULD NEED TO RECEIVE COPIES OF RECORDS THRU HIM FROM EACH FACILITY [facility], [facility] AND [place].

## 2020-04-03 ENCOUNTER — DOCUMENTATION (OUTPATIENT)
Dept: FAMILY MEDICINE CLINIC | Facility: CLINIC | Age: 60
End: 2020-04-03

## 2020-04-03 ENCOUNTER — TELEPHONE (OUTPATIENT)
Dept: FAMILY MEDICINE CLINIC | Facility: CLINIC | Age: 60
End: 2020-04-03

## 2020-04-03 NOTE — TELEPHONE ENCOUNTER
PT IS WANTING A REFERRAL TO [facility]    STATES HAS WHISTLING SOUND ALL THE TIME EVERYWHERE BUT IS WORSE AROUND THE HOUSE. SOUNDS LIKE A LEAKING AIR HOSE CONSTANTLY BOTH EARS.

## 2020-04-03 NOTE — PROGRESS NOTES
This patient has had an extensive evaluation for complaints as noted in the record.  There is been no organicity found to explain this.  A psychological evaluation did not uncover any underlying psychological problem.    We assume his complaints are real and true.    Sincerely, Leandro Villanueva MD

## 2020-04-03 NOTE — TELEPHONE ENCOUNTER
MD SPOKE WITH PATIENT AND SPOUSE EARLIER THIS DATE.       PT/SPOUSE WERE ADVISED REQUESTED RECORDS AVAILABLE FOR PICKUP TODAY.  VERBALIZED UNDERSTANDING.

## 2020-04-03 NOTE — TELEPHONE ENCOUNTER
He probably needs to come in on Monday and let Dr. Reeves examine his ears before we refer for that.  It could just be wax occlusion. Check with Dr. Reeves on Monday morning and see what he wants to do.

## 2020-04-14 DIAGNOSIS — R44.0 AUDITORY HALLUCINATIONS: ICD-10-CM

## 2020-04-15 ENCOUNTER — OFFICE VISIT (OUTPATIENT)
Dept: FAMILY MEDICINE CLINIC | Facility: CLINIC | Age: 60
End: 2020-04-15

## 2020-04-15 VITALS
OXYGEN SATURATION: 98 % | DIASTOLIC BLOOD PRESSURE: 81 MMHG | SYSTOLIC BLOOD PRESSURE: 137 MMHG | HEART RATE: 67 BPM | BODY MASS INDEX: 30.79 KG/M2 | WEIGHT: 184.8 LBS | TEMPERATURE: 99.1 F | HEIGHT: 65 IN

## 2020-04-15 DIAGNOSIS — R44.0 AUDITORY HALLUCINATIONS: ICD-10-CM

## 2020-04-15 PROCEDURE — 99213 OFFICE O/P EST LOW 20 MIN: CPT | Performed by: FAMILY MEDICINE

## 2020-04-15 RX ORDER — QUETIAPINE FUMARATE 50 MG/1
TABLET, FILM COATED ORAL
Qty: 45 TABLET | Refills: 3 | Status: SHIPPED | OUTPATIENT
Start: 2020-04-15 | End: 2020-04-15 | Stop reason: SDUPTHER

## 2020-04-15 RX ORDER — QUETIAPINE FUMARATE 50 MG/1
TABLET, FILM COATED ORAL
Qty: 135 TABLET | Refills: 1 | Status: SHIPPED | OUTPATIENT
Start: 2020-04-15 | End: 2021-06-01

## 2020-04-15 NOTE — PROGRESS NOTES
Subjective   Bryant Delgado is a 60 y.o. male.     60-year-old male with tinnitus?    Tinnitus   This is a chronic problem. The current episode started more than 1 year ago. The problem occurs daily. The problem has been unchanged. Pertinent negatives include no headaches. Exacerbated by: ?  Electromagnetic fields. Treatments tried: Seroquel-helps. The treatment provided mild relief.       The following portions of the patient's history were reviewed and updated as appropriate: allergies, current medications, past family history, past medical history, past social history, past surgical history and problem list.    Review of Systems   HENT: Positive for tinnitus. Negative for ear pain.    Neurological: Negative for dizziness, seizures and headaches.       Objective   Physical Exam   Constitutional: He is oriented to person, place, and time. He appears well-developed and well-nourished.   Mild obesity   HENT:   Right Ear: External ear normal.   Left Ear: External ear normal.   Mouth/Throat: Oropharynx is clear and moist.   Neurological: He is alert and oriented to person, place, and time.   Psychiatric: He has a normal mood and affect. His behavior is normal. Judgment and thought content normal.   Nursing note and vitals reviewed.      Assessment/Plan   Bryant was seen today for tinnitus.    Diagnoses and all orders for this visit:    Auditory hallucinations  -     QUEtiapine (SEROquel) 50 MG tablet; TAKE 1/2 TABLE IN THE MORNING AND 1 TABLET BY MOUTH  AT NIGHT  -     Ambulatory Referral to ENT (Otolaryngology)       Plan above full neurology psychiatric work-up unrevealing except above-mentioned MRI findings

## 2020-06-03 ENCOUNTER — PROCEDURE VISIT (OUTPATIENT)
Dept: OTOLARYNGOLOGY | Facility: CLINIC | Age: 60
End: 2020-06-03

## 2020-06-03 ENCOUNTER — OFFICE VISIT (OUTPATIENT)
Dept: OTOLARYNGOLOGY | Facility: CLINIC | Age: 60
End: 2020-06-03

## 2020-06-03 VITALS
SYSTOLIC BLOOD PRESSURE: 153 MMHG | WEIGHT: 182 LBS | HEART RATE: 58 BPM | BODY MASS INDEX: 30.32 KG/M2 | HEIGHT: 65 IN | RESPIRATION RATE: 16 BRPM | TEMPERATURE: 98.1 F | DIASTOLIC BLOOD PRESSURE: 88 MMHG

## 2020-06-03 DIAGNOSIS — H90.3 SENSORINEURAL HEARING LOSS (SNHL) OF BOTH EARS: Primary | ICD-10-CM

## 2020-06-03 DIAGNOSIS — H93.299 ABNORMAL AUDITORY PERCEPTION, UNSPECIFIED LATERALITY: ICD-10-CM

## 2020-06-03 DIAGNOSIS — H93.13 TINNITUS OF BOTH EARS: ICD-10-CM

## 2020-06-03 PROCEDURE — 99213 OFFICE O/P EST LOW 20 MIN: CPT | Performed by: PHYSICIAN ASSISTANT

## 2020-06-03 RX ORDER — ASPIRIN 81 MG/1
81 TABLET ORAL DAILY
COMMUNITY
End: 2022-10-03

## 2020-06-03 NOTE — PROGRESS NOTES
SALVATORE Cisneros     Chief Complaint   Patient presents with   • Ear Problem        HISTORY OF PRESENT ILLNESS:     Bryant Delgado is a  60 y.o.  male who complains of hearing loss and tinnitus. The symptoms are localized to both ears. The patient has had moderate symptoms. The symptoms have been relatively constant for the last several years. The symptoms are aggravated by noise exposure. The symptoms are improved by no identifiable factors.    The patient reports hearing a frying sound that is worse when sitting at home.          Review of Systems   Constitutional: Negative for activity change, appetite change, chills, diaphoresis, fatigue, fever and unexpected weight change.   HENT: Positive for hearing loss and tinnitus. Negative for congestion, ear discharge, ear pain, facial swelling, mouth sores, nosebleeds, postnasal drip, rhinorrhea, sinus pressure, sneezing, sore throat, trouble swallowing and voice change.    Eyes: Negative for pain, discharge, redness, itching and visual disturbance.   Respiratory: Negative for apnea, cough, choking, chest tightness, shortness of breath, wheezing and stridor.    Gastrointestinal: Negative for nausea and vomiting.   Endocrine: Negative for cold intolerance and heat intolerance.   Musculoskeletal: Negative for arthralgias, back pain, gait problem, neck pain and neck stiffness.   Skin: Negative for rash.   Allergic/Immunologic: Negative for environmental allergies and food allergies.   Neurological: Negative for dizziness, tremors, seizures, syncope, facial asymmetry, speech difficulty, weakness, light-headedness, numbness and headaches.   Hematological: Negative for adenopathy. Does not bruise/bleed easily.   Psychiatric/Behavioral: Negative for behavioral problems, sleep disturbance and suicidal ideas. The patient is not nervous/anxious and is not hyperactive.    :    Past History:  Past Medical History:   Diagnosis Date   • Hyperlipidemia    • Hypertension     • Neuropathy    • Sensorineural hearing loss (SNHL) of both ears 6/3/2020     Past Surgical History:   Procedure Laterality Date   • CERVICAL FUSION     • LUMBAR LAMINECTOMY       History reviewed. No pertinent family history.  Social History     Tobacco Use   • Smoking status: Never Smoker   • Smokeless tobacco: Current User     Types: Snuff   Substance Use Topics   • Alcohol use: No   • Drug use: No     Outpatient Medications Marked as Taking for the 6/3/20 encounter (Office Visit) with Juliocesar Marsh PA   Medication Sig Dispense Refill   • aspirin 81 MG EC tablet Take 81 mg by mouth Daily.     • Cholecalciferol (VITAMIN D3) 125 MCG (5000 UT) capsule capsule Take 5,000 Units by mouth Daily.     • Cyanocobalamin (VITAMIN B 12) 500 MCG tablet Take 1,000 mcg by mouth Daily.     • gabapentin (NEURONTIN) 600 MG tablet 3 (Three) Times a Day.  2   • lisinopril (PRINIVIL,ZESTRIL) 20 MG tablet Take 1 tablet by mouth 2 (Two) Times a Day. 180 tablet 1   • oxyCODONE-acetaminophen (PERCOCET) 7.5-325 MG per tablet Take 1 tablet by mouth Every 6 (Six) Hours As Needed.     • pravastatin (PRAVACHOL) 40 MG tablet Take 1 tablet by mouth every night at bedtime. 90 tablet 1   • QUEtiapine (SEROquel) 50 MG tablet TAKE 1/2 TABLE IN THE MORNING AND 1 TABLET BY MOUTH  AT NIGHT 135 tablet 1     Allergies:  Keflex [cephalexin]          Vital Signs:   Vitals:    06/03/20 1329   BP: 153/88   Pulse: 58   Resp: 16   Temp: 98.1 °F (36.7 °C)         EXAMINATION:   CONSTITUTIONAL: well nourished, alert, oriented, in no acute distress     COMMUNICATION AND VOICE: able to communicate normally, normal voice quality    HEAD: normocephalic, no lesions, atraumatic, no tenderness, no masses     FACE: appearance normal, no lesions, no tenderness, no deformities, facial motion symmetric    SALIVARY GLANDS: parotid glands with no tenderness, no swelling, no masses, submandibular glands with normal size, nontender    EYES: ocular motility normal,  eyelids normal, orbits normal, no proptosis, conjunctiva normal , pupils equal, round     EARS:  Hearing: response to conversational voice normal bilaterally   External Ears: auricles without lesions  Otoscopic: tympanic membrane appearance normal, no lesions, no perforation, normal mobility, no fluid    NOSE:  External Nose: structure normal, no tenderness on palpation, no nasal discharge, no lesions, no evidence of trauma, nostrils patent   Intranasal Exam: nasal mucosa normal, vestibule within normal limits, inferior turbinate normal, nasal septum midline     ORAL:  Lips: upper and lower lips without lesion   Teeth: dentition within normal limits for age   Gums: gingivae healthy   Oral Mucosa: oral mucosa normal, no mucosal lesions   Floor of Mouth: Warthin’s duct patent, mucosa normal  Tongue: lingual mucosa normal without lesions, normal tongue mobility   Palate: soft and hard palates with normal mucosa and structure  Oropharynx: oropharyngeal mucosa normal    NECK: neck appearance normal, no mass,  noted without erythema or tenderness    THYROID: no overt thyromegaly, no tenderness, nodules or mass present on palpation, position midline     LYMPH NODES: no lymphadenopathy    CHEST/RESPIRATORY: respiratory effort normal, normal breath sounds     CARDIOVASCULAR: rate and rhythm normal, extremities without cyanosis or edema      NEUROLOGIC/PSYCHIATRIC: oriented to time, place and person, mood normal, affect appropriate, CN II-XII intact grossly    RESULTS REVIEW:    I have reviewed the patients old records in the chart.       Assessment    Diagnosis Plan   1. Sensorineural hearing loss (SNHL) of both ears     2. Tinnitus of both ears         Plan    Patient Instructions   Recommend hearing aid evaluation with tinnitus maskers. Recheck hearing in one year.               Return in about 1 year (around 6/3/2021) for Recheck ears with audiogram.    SALVATORE Cisneros  06/03/20  13:46

## 2020-06-23 ENCOUNTER — TELEPHONE (OUTPATIENT)
Dept: FAMILY MEDICINE CLINIC | Facility: CLINIC | Age: 60
End: 2020-06-23

## 2020-07-01 ENCOUNTER — TELEPHONE (OUTPATIENT)
Dept: FAMILY MEDICINE CLINIC | Facility: CLINIC | Age: 60
End: 2020-07-01

## 2020-07-01 DIAGNOSIS — Z77.29 EXPOSURE TO METHAMPHETAMINE: ICD-10-CM

## 2020-07-01 DIAGNOSIS — F19.91 HISTORY OF DRUG USE DISORDER: Primary | ICD-10-CM

## 2020-07-01 DIAGNOSIS — Z78.9: ICD-10-CM

## 2020-07-01 NOTE — TELEPHONE ENCOUNTER
PT IS REQUESTING DRUG SCREEN D/T METH EXPOSURE AROUND RESIDENCE.      PER MD OK FOR A URINE DRUG SCREEN   DX:  DRUG EXPOSURE    PT ADVISED MD SPOKE WITH [facility]-VERBALIZED UNDERSTANDING.

## 2020-07-02 ENCOUNTER — TELEPHONE (OUTPATIENT)
Dept: FAMILY MEDICINE CLINIC | Facility: CLINIC | Age: 60
End: 2020-07-02

## 2020-07-02 NOTE — TELEPHONE ENCOUNTER
"Dr. Villanueva gave me verbal lab results over phone to call patient's with.    \"Drug urine test was all negative.\"      Patient informed.  "

## 2020-08-14 PROBLEM — R44.0 AUDITORY HALLUCINATIONS: Status: ACTIVE | Noted: 2020-02-26

## 2020-08-14 RX ORDER — LISINOPRIL 20 MG/1
TABLET ORAL
Qty: 30 TABLET | Refills: 0 | Status: SHIPPED | OUTPATIENT
Start: 2020-08-14 | End: 2020-09-08

## 2020-08-31 ENCOUNTER — OFFICE VISIT (OUTPATIENT)
Dept: FAMILY MEDICINE CLINIC | Facility: CLINIC | Age: 60
End: 2020-08-31

## 2020-08-31 VITALS
HEIGHT: 65 IN | RESPIRATION RATE: 16 BRPM | OXYGEN SATURATION: 98 % | SYSTOLIC BLOOD PRESSURE: 120 MMHG | TEMPERATURE: 97.6 F | DIASTOLIC BLOOD PRESSURE: 82 MMHG | BODY MASS INDEX: 29.49 KG/M2 | WEIGHT: 177 LBS | HEART RATE: 64 BPM

## 2020-08-31 DIAGNOSIS — Z23 NEED FOR IMMUNIZATION AGAINST INFLUENZA: ICD-10-CM

## 2020-08-31 DIAGNOSIS — Z00.00 ANNUAL PHYSICAL EXAM: ICD-10-CM

## 2020-08-31 DIAGNOSIS — Z23 NEED FOR PROPHYLACTIC VACCINATION AND INOCULATION AGAINST INFLUENZA: ICD-10-CM

## 2020-08-31 DIAGNOSIS — I10 HYPERTENSION, UNSPECIFIED TYPE: ICD-10-CM

## 2020-08-31 DIAGNOSIS — Z20.828 VIRAL DISEASE EXPOSURE: ICD-10-CM

## 2020-08-31 DIAGNOSIS — Z12.5 SPECIAL SCREENING FOR MALIGNANT NEOPLASM OF PROSTATE: ICD-10-CM

## 2020-08-31 DIAGNOSIS — E53.8 B12 DEFICIENCY: ICD-10-CM

## 2020-08-31 DIAGNOSIS — E78.2 MIXED HYPERLIPIDEMIA: Primary | ICD-10-CM

## 2020-08-31 DIAGNOSIS — R53.83 FATIGUE, UNSPECIFIED TYPE: ICD-10-CM

## 2020-08-31 PROCEDURE — G0102 PROSTATE CA SCREENING; DRE: HCPCS | Performed by: FAMILY MEDICINE

## 2020-08-31 PROCEDURE — G0439 PPPS, SUBSEQ VISIT: HCPCS | Performed by: FAMILY MEDICINE

## 2020-08-31 PROCEDURE — 90686 IIV4 VACC NO PRSV 0.5 ML IM: CPT | Performed by: FAMILY MEDICINE

## 2020-08-31 PROCEDURE — 90471 IMMUNIZATION ADMIN: CPT | Performed by: FAMILY MEDICINE

## 2020-08-31 NOTE — PATIENT INSTRUCTIONS
Exercising to Stay Healthy  To become healthy and stay healthy, it is recommended that you do moderate-intensity and vigorous-intensity exercise. You can tell that you are exercising at a moderate intensity if your heart starts beating faster and you start breathing faster but can still hold a conversation. You can tell that you are exercising at a vigorous intensity if you are breathing much harder and faster and cannot hold a conversation while exercising.  Exercising regularly is important. It has many health benefits, such as:  · Improving overall fitness, flexibility, and endurance.  · Increasing bone density.  · Helping with weight control.  · Decreasing body fat.  · Increasing muscle strength.  · Reducing stress and tension.  · Improving overall health.  How often should I exercise?  Choose an activity that you enjoy, and set realistic goals. Your health care provider can help you make an activity plan that works for you.  Exercise regularly as told by your health care provider. This may include:  · Doing strength training two times a week, such as:  ? Lifting weights.  ? Using resistance bands.  ? Push-ups.  ? Sit-ups.  ? Yoga.  · Doing a certain intensity of exercise for a given amount of time. Choose from these options:  ? A total of 150 minutes of moderate-intensity exercise every week.  ? A total of 75 minutes of vigorous-intensity exercise every week.  ? A mix of moderate-intensity and vigorous-intensity exercise every week.  Children, pregnant women, people who have not exercised regularly, people who are overweight, and older adults may need to talk with a health care provider about what activities are safe to do. If you have a medical condition, be sure to talk with your health care provider before you start a new exercise program.  What are some exercise ideas?  Moderate-intensity exercise ideas include:  · Walking 1 mile (1.6 km) in about 15  minutes.  · Biking.  · Hiking.  · Golfing.  · Dancing.  · Water aerobics.  Vigorous-intensity exercise ideas include:  · Walking 4.5 miles (7.2 km) or more in about 1 hour.  · Jogging or running 5 miles (8 km) in about 1 hour.  · Biking 10 miles (16.1 km) or more in about 1 hour.  · Lap swimming.  · Roller-skating or in-line skating.  · Cross-country skiing.  · Vigorous competitive sports, such as football, basketball, and soccer.  · Jumping rope.  · Aerobic dancing.  What are some everyday activities that can help me to get exercise?  · Yard work, such as:  ? Pushing a .  ? Raking and bagging leaves.  · Washing your car.  · Pushing a stroller.  · Shoveling snow.  · Gardening.  · Washing windows or floors.  How can I be more active in my day-to-day activities?  · Use stairs instead of an elevator.  · Take a walk during your lunch break.  · If you drive, park your car farther away from your work or school.  · If you take public transportation, get off one stop early and walk the rest of the way.  · Stand up or walk around during all of your indoor phone calls.  · Get up, stretch, and walk around every 30 minutes throughout the day.  · Enjoy exercise with a friend. Support to continue exercising will help you keep a regular routine of activity.  What guidelines can I follow while exercising?  · Before you start a new exercise program, talk with your health care provider.  · Do not exercise so much that you hurt yourself, feel dizzy, or get very short of breath.  · Wear comfortable clothes and wear shoes with good support.  · Drink plenty of water while you exercise to prevent dehydration or heat stroke.  · Work out until your breathing and your heartbeat get faster.  Where to find more information  · [place] Department of Health and Human Services: www.hhs.gov  · Centers for Disease Control and Prevention (CDC): www.cdc.gov  Summary  · Exercising regularly is important. It will improve your overall fitness,  flexibility, and endurance.  · Regular exercise also will improve your overall health. It can help you control your weight, reduce stress, and improve your bone density.  · Do not exercise so much that you hurt yourself, feel dizzy, or get very short of breath.  · Before you start a new exercise program, talk with your health care provider.  This information is not intended to replace advice given to you by your health care provider. Make sure you discuss any questions you have with your health care provider.  Document Released: 01/20/2012 Document Revised: 11/30/2018 Document Reviewed: 11/08/2018  Elsevier Patient Education © 2020 [employer]      Fall Prevention in the Home, Adult  Falls can cause injuries and can affect people from all age groups. There are many simple things that you can do to make your home safe and to help prevent falls. Ask for help when making these changes, if needed.  What actions can I take to prevent falls?  General instructions  · Use good lighting in all rooms. Replace any light bulbs that burn out.  · Turn on lights if it is dark. Use night-lights.  · Place frequently used items in easy-to-reach places. Lower the shelves around your home if necessary.  · Set up furniture so that there are clear paths around it. Avoid moving your furniture around.  · Remove throw rugs and other tripping hazards from the floor.  · Avoid walking on wet floors.  · Fix any uneven floor surfaces.  · Add color or contrast paint or tape to grab bars and handrails in your home. Place contrasting color strips on the first and last steps of stairways.  · When you use a stepladder, make sure that it is completely opened and that the sides are firmly locked. Have someone hold the ladder while you are using it. Do not climb a closed stepladder.  · Be aware of any and all pets.  What can I do in the bathroom?         · Keep the floor dry. Immediately clean up any water that spills onto the floor.  · Remove soap  buildup in the tub or shower on a regular basis.  · Use non-skid mats or decals on the floor of the tub or shower.  · Attach bath mats securely with double-sided, non-slip rug tape.  · If you need to sit down while you are in the shower, use a plastic, non-slip stool.  · Install grab bars by the toilet and in the tub and shower. Do not use towel bars as grab bars.  What can I do in the bedroom?  · Make sure that a bedside light is easy to reach.  · Do not use oversized bedding that drapes onto the floor.  · Have a firm chair that has side arms to use for getting dressed.  What can I do in the kitchen?  · Clean up any spills right away.  · If you need to reach for something above you, use a sturdy step stool that has a grab bar.  · Keep electrical cables out of the way.  · Do not use floor polish or wax that makes floors slippery. If you must use wax, make sure that it is non-skid floor wax.  What can I do in the stairways?  · Do not leave any items on the stairs.  · Make sure that you have a light switch at the top of the stairs and the bottom of the stairs. Have them installed if you do not have them.  · Make sure that there are handrails on both sides of the stairs. Fix handrails that are broken or loose. Make sure that handrails are as long as the stairways.  · Install non-slip stair treads on all stairs in your home.  · Avoid having throw rugs at the top or bottom of stairways, or secure the rugs with carpet tape to prevent them from moving.  · Choose a carpet design that does not hide the edge of steps on the stairway.  · Check any carpeting to make sure that it is firmly attached to the stairs. Fix any carpet that is loose or worn.  What can I do on the outside of my home?  · Use bright outdoor lighting.  · Regularly repair the edges of walkways and driveways and fix any cracks.  · Remove high doorway thresholds.  · Trim any shrubbery on the main path into your home.  · Regularly check that handrails are  securely fastened and in good repair. Both sides of any steps should have handrails.  · Install guardrails along the edges of any raised decks or porches.  · Clear walkways of debris and clutter, including tools and rocks.  · Have leaves, snow, and ice cleared regularly.  · Use sand or salt on walkways during winter months.  · In the garage, clean up any spills right away, including grease or oil spills.  What other actions can I take?  · Wear closed-toe shoes that fit well and support your feet. Wear shoes that have rubber soles or low heels.  · Use mobility aids as needed, such as canes, walkers, scooters, and crutches.  · Review your medicines with your health care provider. Some medicines can cause dizziness or changes in blood pressure, which increase your risk of falling.  Talk with your health care provider about other ways that you can decrease your risk of falls. This may include working with a physical therapist or  to improve your strength, balance, and endurance.  Where to find more information  · Centers for Disease Control and Prevention, STEADI: https://www.cdc.gov  · National [place] on Aging: https://dj3qsuk.kaylyn.nih.gov  Contact a health care provider if:  · You are afraid of falling at home.  · You feel weak, drowsy, or dizzy at home.  · You fall at home.  Summary  · There are many simple things that you can do to make your home safe and to help prevent falls.  · Ways to make your home safe include removing tripping hazards and installing grab bars in the bathroom.  · Ask for help when making these changes in your home.  This information is not intended to replace advice given to you by your health care provider. Make sure you discuss any questions you have with your health care provider.  Document Released: 12/08/2003 Document Revised: 11/30/2018 Document Reviewed: 08/02/2018  Elsevier Patient Education © 2020 [employer]

## 2020-08-31 NOTE — PROGRESS NOTES
The ABCs of the Annual Wellness Visit  Subsequent Medicare Wellness Visit    Chief Complaint   Patient presents with   • Medicare Wellness-subsequent     Fasting       Subjective   History of Present Illness:  Bryant Delgado is a 60 y.o. male who presents for a Subsequent Medicare Wellness Visit.    HEALTH RISK ASSESSMENT    Recent Hospitalizations:  No hospitalization(s) within the last year.    Current Medical Providers:  Patient Care Team:  Bennett Villanueva MD as PCP - General (Family Medicine)  Kishan Chavez APRN as PCP - Claims Attributed    Smoking Status:  Social History     Tobacco Use   Smoking Status Never Smoker   Smokeless Tobacco Current User   • Types: Snuff       Alcohol Consumption:  Social History     Substance and Sexual Activity   Alcohol Use No       Depression Screen:   PHQ-2/PHQ-9 Depression Screening 8/31/2020   Little interest or pleasure in doing things 0   Feeling down, depressed, or hopeless 0   Trouble falling or staying asleep, or sleeping too much -   Feeling tired or having little energy -   Poor appetite or overeating -   Feeling bad about yourself - or that you are a failure or have let yourself or your family down -   Trouble concentrating on things, such as reading the newspaper or watching television -   Moving or speaking so slowly that other people could have noticed. Or the opposite - being so fidgety or restless that you have been moving around a lot more than usual -   Thoughts that you would be better off dead, or of hurting yourself in some way -   Total Score 0   If you checked off any problems, how difficult have these problems made it for you to do your work, take care of things at home, or get along with other people? -       Fall Risk Screen:  STEADI Fall Risk Assessment has not been completed.    Health Habits and Functional and Cognitive Screening:  Functional & Cognitive Status 8/31/2020   Do you have difficulty preparing food and eating? No   Do you  have difficulty bathing yourself, getting dressed or grooming yourself? No   Do you have difficulty using the toilet? No   Do you have difficulty moving around from place to place? No   Do you have trouble with steps or getting out of a bed or a chair? No   Current Diet Well Balanced Diet   Dental Exam Up to date   Eye Exam Up to date   Exercise (times per week) 7 times per week   Current Exercise Activities Include Gardening   Do you need help using the phone?  No   Are you deaf or do you have serious difficulty hearing?  Yes   Do you need help with transportation? Yes   Do you need help shopping? No   Do you need help preparing meals?  No   Do you need help with housework?  No   Do you need help with laundry? No   Do you need help taking your medications? No   Do you need help managing money? No   Do you ever drive or ride in a car without wearing a seat belt? No   Have you felt unusual stress, anger or loneliness in the last month? Yes   Who do you live with? Spouse   If you need help, do you have trouble finding someone available to you? No   Have you been bothered in the last four weeks by sexual problems? No   Do you have difficulty concentrating, remembering or making decisions? No         Does the patient have evidence of cognitive impairment? Yes    Asprin use counseling:Taking ASA appropriately as indicated    Age-appropriate Screening Schedule:  Refer to the list below for future screening recommendations based on patient's age, sex and/or medical conditions. Orders for these recommended tests are listed in the plan section. The patient has been provided with a written plan.    Health Maintenance   Topic Date Due   • ZOSTER VACCINE (1 of 2) 08/31/2020 (Originally 2/24/2010)   • LIPID PANEL  08/31/2021   • COLONOSCOPY  06/27/2028   • INFLUENZA VACCINE  Completed   • TDAP/TD VACCINES  Discontinued          The following portions of the patient's history were reviewed and updated as appropriate: allergies,  current medications, past family history, past medical history, past social history, past surgical history and problem list.    Outpatient Medications Prior to Visit   Medication Sig Dispense Refill   • aspirin 81 MG EC tablet Take 81 mg by mouth Daily.     • Cholecalciferol (VITAMIN D3) 125 MCG (5000 UT) capsule capsule Take 5,000 Units by mouth Daily.     • Cyanocobalamin (VITAMIN B 12) 500 MCG tablet Take 1,000 mcg by mouth Daily.     • gabapentin (NEURONTIN) 600 MG tablet 3 (Three) Times a Day.  2   • lisinopril (PRINIVIL,ZESTRIL) 20 MG tablet Take 1 tablet by mouth twice daily 30 tablet 0   • oxyCODONE-acetaminophen (PERCOCET) 7.5-325 MG per tablet Take 1 tablet by mouth Every 6 (Six) Hours As Needed.     • pravastatin (PRAVACHOL) 40 MG tablet Take 1 tablet by mouth every night at bedtime. 90 tablet 1   • QUEtiapine (SEROquel) 50 MG tablet TAKE 1/2 TABLE IN THE MORNING AND 1 TABLET BY MOUTH  AT NIGHT (Patient taking differently: Take 12.5 mg by mouth Every Night.) 135 tablet 1   • cyclobenzaprine (FLEXERIL) 10 MG tablet        No facility-administered medications prior to visit.        Patient Active Problem List   Diagnosis   • Sensorineural hearing loss (SNHL) of both ears   • Tinnitus of both ears   • Auditory hallucinations       Advanced Care Planning:  ACP discussion was declined by the patient. Patient does not have an advance directive, declines further assistance.    Review of Systems   Cardiovascular: Negative for chest pain and leg swelling.   Gastrointestinal:        Colonoscopy 2018   Musculoskeletal: Positive for arthralgias and back pain.        Sees pain management   Neurological:        Neurology work-up plus PET scan EEG at [facility] shows area in the right temporoparietal part of brain with some normality-near the auditory section of the brain   Psychiatric/Behavioral:        The patient has  auditory hallucinations--no dementia was found at [facility]--hallucinations are abated by one  "fourth of the 25 mg of Seroquel   All other systems reviewed and are negative.      Compared to one year ago, the patient feels his physical health is the same.  Compared to one year ago, the patient feels his mental health is the same.    Reviewed chart for potential of high risk medication in the elderly: yes  Reviewed chart for potential of harmful drug interactions in the elderly:yes    Objective         Vitals:    08/31/20 1322   BP: 120/82   BP Location: Left arm   Patient Position: Sitting   Cuff Size: Adult   Pulse: 64   Resp: 16   Temp: 97.6 °F (36.4 °C)   TempSrc: Temporal   SpO2: 98%   Weight: 80.3 kg (177 lb)   Height: 165.1 cm (65\")       Body mass index is 29.45 kg/m².  Discussed the patient's BMI with him. The BMI is above average; no BMI management plan is appropriate..    Physical Exam   Constitutional: He is oriented to person, place, and time.   Slightly overweight   HENT:   Right Ear: External ear normal.   Left Ear: External ear normal.   Eyes: Pupils are equal, round, and reactive to light. EOM are normal.   Neck: No thyromegaly present.   Good carotid pulses   Cardiovascular: Normal rate and regular rhythm.   Pulmonary/Chest: Effort normal and breath sounds normal.   Right breast slightly larger than left no abnormality felt   Abdominal: Soft. Bowel sounds are normal.   Genitourinary: Rectum normal, prostate normal and penis normal.   Genitourinary Comments: No testicular masses inguinal hernia or adenopathy-prostate 2+ no nodules   Musculoskeletal: He exhibits edema.   Lymphadenopathy:     He has no cervical adenopathy.   Neurological: He is alert and oriented to person, place, and time.   No neurologic focality   Skin: Skin is warm and dry. Capillary refill takes less than 2 seconds.   Psychiatric: He has a normal mood and affect. His behavior is normal. Judgment and thought content normal.   Nursing note and vitals reviewed.            Assessment/Plan   Medicare Risks and Personalized " Health Plan  CMS Preventative Services Quick Reference  Fall Risk  Prostate Cancer Screening     The above risks/problems have been discussed with the patient.  Pertinent information has been shared with the patient in the After Visit Summary.  Follow up plans and orders are seen below in the Assessment/Plan Section.    Diagnoses and all orders for this visit:    1. Mixed hyperlipidemia (Primary)  -     Comprehensive Metabolic Panel  -     Lipid Panel    2. Fatigue, unspecified type  -     TSH  -     T4, free  -     CBC & Differential    3. B12 deficiency  -     Vitamin B12  -     Folate    4. Need for immunization against influenza  -     Fluarix/Fluzone/Afluria Quad>6 Months    5. Special screening for malignant neoplasm of prostate  -     PSA Screen    6. Hypertension, unspecified type  -     POC Urinalysis Dipstick, Multipro    7. Viral disease exposure  -     SARS-CoV-2 Antibody, IgA  -     SARS-CoV-2 Antibody, IgG ([place])  -     SARS-CoV-2 Antibody, IgM    8. Annual physical exam    9. Need for prophylactic vaccination and inoculation against influenza      Follow Up:  Return in 6 months (on 2/28/2021).     An After Visit Summary and PPPS were given to the patient.         Plan above plus flu shot-Seroquel 25 mg 1/4 tablet nightly takes care of auditory hallucinations     R/O Pneumonia

## 2020-09-02 LAB
ALBUMIN SERPL-MCNC: 4.6 G/DL (ref 3.5–5.2)
ALBUMIN/GLOB SERPL: 2.6 G/DL
ALP SERPL-CCNC: 44 U/L (ref 39–117)
ALT SERPL-CCNC: 11 U/L (ref 1–41)
AST SERPL-CCNC: 13 U/L (ref 1–40)
BASOPHILS # BLD AUTO: 0.02 10*3/MM3 (ref 0–0.2)
BASOPHILS NFR BLD AUTO: 0.3 % (ref 0–1.5)
BILIRUB SERPL-MCNC: 0.4 MG/DL (ref 0–1.2)
BUN SERPL-MCNC: 14 MG/DL (ref 8–23)
BUN/CREAT SERPL: 15.2 (ref 7–25)
CALCIUM SERPL-MCNC: 9.5 MG/DL (ref 8.6–10.5)
CHLORIDE SERPL-SCNC: 104 MMOL/L (ref 98–107)
CHOLEST SERPL-MCNC: 164 MG/DL (ref 0–200)
CO2 SERPL-SCNC: 26.8 MMOL/L (ref 22–29)
CREAT SERPL-MCNC: 0.92 MG/DL (ref 0.76–1.27)
EOSINOPHIL # BLD AUTO: 0.12 10*3/MM3 (ref 0–0.4)
EOSINOPHIL NFR BLD AUTO: 2 % (ref 0.3–6.2)
ERYTHROCYTE [DISTWIDTH] IN BLOOD BY AUTOMATED COUNT: 12.6 % (ref 12.3–15.4)
FOLATE SERPL-MCNC: 14.5 NG/ML (ref 4.78–24.2)
GLOBULIN SER CALC-MCNC: 1.8 GM/DL
GLUCOSE SERPL-MCNC: 104 MG/DL (ref 65–99)
HCT VFR BLD AUTO: 42.3 % (ref 37.5–51)
HDLC SERPL-MCNC: 46 MG/DL (ref 40–60)
HGB BLD-MCNC: 13.7 G/DL (ref 13–17.7)
IMM GRANULOCYTES # BLD AUTO: 0.02 10*3/MM3 (ref 0–0.05)
IMM GRANULOCYTES NFR BLD AUTO: 0.3 % (ref 0–0.5)
LDLC SERPL CALC-MCNC: 99 MG/DL (ref 0–100)
LYMPHOCYTES # BLD AUTO: 1.35 10*3/MM3 (ref 0.7–3.1)
LYMPHOCYTES NFR BLD AUTO: 22 % (ref 19.6–45.3)
MCH RBC QN AUTO: 30.9 PG (ref 26.6–33)
MCHC RBC AUTO-ENTMCNC: 32.4 G/DL (ref 31.5–35.7)
MCV RBC AUTO: 95.5 FL (ref 79–97)
MONOCYTES # BLD AUTO: 0.59 10*3/MM3 (ref 0.1–0.9)
MONOCYTES NFR BLD AUTO: 9.6 % (ref 5–12)
NEUTROPHILS # BLD AUTO: 4.05 10*3/MM3 (ref 1.7–7)
NEUTROPHILS NFR BLD AUTO: 65.8 % (ref 42.7–76)
NRBC BLD AUTO-RTO: 0 /100 WBC (ref 0–0.2)
PLATELET # BLD AUTO: 213 10*3/MM3 (ref 140–450)
POTASSIUM SERPL-SCNC: 4.8 MMOL/L (ref 3.5–5.2)
PROT SERPL-MCNC: 6.4 G/DL (ref 6–8.5)
PSA SERPL-MCNC: 0.7 NG/ML (ref 0–4)
RBC # BLD AUTO: 4.43 10*6/MM3 (ref 4.14–5.8)
SARS-COV-2 IGA SERPL QL IA: NEGATIVE
SARS-COV-2 IGG SERPL QL IA: NEGATIVE
SARS-COV-2 IGG SERPL QL IA: NORMAL
SARS-COV-2 IGM SERPL QL IA: NEGATIVE
SODIUM SERPL-SCNC: 138 MMOL/L (ref 136–145)
T4 FREE SERPL-MCNC: 1.04 NG/DL (ref 0.93–1.7)
TRIGL SERPL-MCNC: 93 MG/DL (ref 0–150)
TSH SERPL DL<=0.005 MIU/L-ACNC: 0.67 UIU/ML (ref 0.27–4.2)
VIT B12 SERPL-MCNC: 1112 PG/ML (ref 211–946)
VLDLC SERPL CALC-MCNC: 18.6 MG/DL
WBC # BLD AUTO: 6.15 10*3/MM3 (ref 3.4–10.8)

## 2020-09-08 RX ORDER — LISINOPRIL 20 MG/1
TABLET ORAL
Qty: 180 TABLET | Refills: 3 | Status: SHIPPED | OUTPATIENT
Start: 2020-09-08 | End: 2021-09-07

## 2020-09-11 RX ORDER — PRAVASTATIN SODIUM 40 MG
TABLET ORAL
Qty: 90 TABLET | Refills: 3 | Status: SHIPPED | OUTPATIENT
Start: 2020-09-11 | End: 2021-10-01

## 2020-11-03 ENCOUNTER — TELEPHONE (OUTPATIENT)
Dept: FAMILY MEDICINE CLINIC | Facility: CLINIC | Age: 60
End: 2020-11-03

## 2020-11-03 NOTE — TELEPHONE ENCOUNTER
PT REQUEST YOU CALL HIM BEFORE YOU CONTACT JONATHAN.    SOUND WAVES ARE PUTTING OFF RADIATION AND ITS BURNING HIM-NECK/EYES/NOSTRILS/THROAT AND MAKES HIM SHORT OF BREATH.      FORCING SOUND WAVES UP 2 DIRECTIONS INTO HIS HOUSE. THE [employer]ART [employer] ARE PUTTING OFF RADIATION AND BURNING HIM..  HE HEARS WHEN THE TRUCKS ARE REVVING UP AND SOUND IS COMING UP THE POWER LINES AND BURNING HIM UP.    PT STATES HE TRIES TO GO AND CATCH THEM-THEY ARE GONE WHEN HE GETS THERE.

## 2020-11-03 NOTE — TELEPHONE ENCOUNTER
Patient wants me to talk to [facility] about his mental status which is normal according to him

## 2020-11-13 NOTE — PROGRESS NOTES
Administered 200mg (1 ml) of depo-testosterone 200mg/ml GENERIC to the LEFT GM ON 11/13/2020 AT 1:02PM. LOT# D- EXP 04/2022 He tolerated well . He returns on 11/27/2020.     Patient was called and advised due lab.  Patient states will be in next week

## 2021-03-01 ENCOUNTER — OFFICE VISIT (OUTPATIENT)
Dept: FAMILY MEDICINE CLINIC | Facility: CLINIC | Age: 61
End: 2021-03-01

## 2021-03-01 VITALS
SYSTOLIC BLOOD PRESSURE: 160 MMHG | HEART RATE: 66 BPM | OXYGEN SATURATION: 98 % | TEMPERATURE: 98.9 F | RESPIRATION RATE: 16 BRPM | HEIGHT: 65 IN | WEIGHT: 184.8 LBS | DIASTOLIC BLOOD PRESSURE: 98 MMHG | BODY MASS INDEX: 30.79 KG/M2

## 2021-03-01 DIAGNOSIS — E78.2 MIXED HYPERLIPIDEMIA: Primary | ICD-10-CM

## 2021-03-01 DIAGNOSIS — R00.2 PALPITATIONS: ICD-10-CM

## 2021-03-01 DIAGNOSIS — R53.83 FATIGUE, UNSPECIFIED TYPE: ICD-10-CM

## 2021-03-01 PROCEDURE — 99214 OFFICE O/P EST MOD 30 MIN: CPT | Performed by: FAMILY MEDICINE

## 2021-03-01 RX ORDER — METOPROLOL SUCCINATE 25 MG/1
25 TABLET, EXTENDED RELEASE ORAL DAILY
Qty: 90 TABLET | Refills: 3 | Status: SHIPPED | OUTPATIENT
Start: 2021-03-01 | End: 2021-08-04

## 2021-03-01 NOTE — PROGRESS NOTES
Subjective   Bryant Delgado is a 61 y.o. male.     61-year-old male with history of hypertension and auditory hallucinations-complaining of palpitations    Hypertension  This is a chronic problem. The current episode started more than 1 year ago. The problem has been waxing and waning since onset. The problem is uncontrolled. Associated symptoms include palpitations. Pertinent negatives include no chest pain or shortness of breath. There are no associated agents to hypertension. Risk factors for coronary artery disease include dyslipidemia, male gender and sedentary lifestyle. Past treatments include ACE inhibitors. Current antihypertension treatment includes ACE inhibitors. The current treatment provides mild improvement. Compliance problems include exercise.        The following portions of the patient's history were reviewed and updated as appropriate: allergies, current medications, past family history, past medical history, past social history, past surgical history and problem list.    Review of Systems   Respiratory: Negative for shortness of breath.    Cardiovascular: Positive for palpitations. Negative for chest pain.   Neurological:        Auditory hallucinations with normal CNS work-up       Objective   Physical Exam  Vitals signs and nursing note reviewed.   Constitutional:       Appearance: Normal appearance.   Cardiovascular:      Rate and Rhythm: Normal rate and regular rhythm.      Pulses: Normal pulses.   Pulmonary:      Effort: Pulmonary effort is normal.      Breath sounds: Normal breath sounds.   Musculoskeletal:      Right lower leg: No edema.      Left lower leg: No edema.   Neurological:      General: No focal deficit present.      Mental Status: He is alert and oriented to person, place, and time.   Psychiatric:         Mood and Affect: Mood normal.         Behavior: Behavior normal.      Comments: Patient said hallucinations for years-would not take full dose of Seroquel          Assessment/Plan   Diagnoses and all orders for this visit:    1. Mixed hyperlipidemia (Primary)  -     Comprehensive Metabolic Panel  -     Lipid Panel    2. Fatigue, unspecified type  -     CBC & Differential    3. Palpitations  -     Ambulatory Referral to Cardiology    Other orders  -     metoprolol succinate XL (Toprol XL) 25 MG 24 hr tablet; Take 1 tablet by mouth Daily.  Dispense: 90 tablet; Refill: 3       Plan above

## 2021-03-02 LAB
ALBUMIN SERPL-MCNC: 4.2 G/DL (ref 3.5–5.2)
ALBUMIN/GLOB SERPL: 2 G/DL
ALP SERPL-CCNC: 45 U/L (ref 39–117)
ALT SERPL-CCNC: 12 U/L (ref 1–41)
AST SERPL-CCNC: 16 U/L (ref 1–40)
BASOPHILS # BLD AUTO: 0.04 10*3/MM3 (ref 0–0.2)
BASOPHILS NFR BLD AUTO: 0.5 % (ref 0–1.5)
BILIRUB SERPL-MCNC: 0.4 MG/DL (ref 0–1.2)
BUN SERPL-MCNC: 12 MG/DL (ref 8–23)
BUN/CREAT SERPL: 11.2 (ref 7–25)
CALCIUM SERPL-MCNC: 9.7 MG/DL (ref 8.6–10.5)
CHLORIDE SERPL-SCNC: 104 MMOL/L (ref 98–107)
CHOLEST SERPL-MCNC: 158 MG/DL (ref 0–200)
CO2 SERPL-SCNC: 27.4 MMOL/L (ref 22–29)
CREAT SERPL-MCNC: 1.07 MG/DL (ref 0.76–1.27)
EOSINOPHIL # BLD AUTO: 0.11 10*3/MM3 (ref 0–0.4)
EOSINOPHIL NFR BLD AUTO: 1.4 % (ref 0.3–6.2)
ERYTHROCYTE [DISTWIDTH] IN BLOOD BY AUTOMATED COUNT: 12.5 % (ref 12.3–15.4)
GLOBULIN SER CALC-MCNC: 2.1 GM/DL
GLUCOSE SERPL-MCNC: 98 MG/DL (ref 65–99)
HCT VFR BLD AUTO: 40.1 % (ref 37.5–51)
HDLC SERPL-MCNC: 45 MG/DL (ref 40–60)
HGB BLD-MCNC: 13.5 G/DL (ref 13–17.7)
IMM GRANULOCYTES # BLD AUTO: 0.06 10*3/MM3 (ref 0–0.05)
IMM GRANULOCYTES NFR BLD AUTO: 0.7 % (ref 0–0.5)
LDLC SERPL CALC-MCNC: 94 MG/DL (ref 0–100)
LYMPHOCYTES # BLD AUTO: 1.67 10*3/MM3 (ref 0.7–3.1)
LYMPHOCYTES NFR BLD AUTO: 20.7 % (ref 19.6–45.3)
MCH RBC QN AUTO: 30.3 PG (ref 26.6–33)
MCHC RBC AUTO-ENTMCNC: 33.7 G/DL (ref 31.5–35.7)
MCV RBC AUTO: 90.1 FL (ref 79–97)
MONOCYTES # BLD AUTO: 0.75 10*3/MM3 (ref 0.1–0.9)
MONOCYTES NFR BLD AUTO: 9.3 % (ref 5–12)
NEUTROPHILS # BLD AUTO: 5.44 10*3/MM3 (ref 1.7–7)
NEUTROPHILS NFR BLD AUTO: 67.4 % (ref 42.7–76)
NRBC BLD AUTO-RTO: 0 /100 WBC (ref 0–0.2)
PLATELET # BLD AUTO: 237 10*3/MM3 (ref 140–450)
POTASSIUM SERPL-SCNC: 5 MMOL/L (ref 3.5–5.2)
PROT SERPL-MCNC: 6.3 G/DL (ref 6–8.5)
RBC # BLD AUTO: 4.45 10*6/MM3 (ref 4.14–5.8)
SODIUM SERPL-SCNC: 141 MMOL/L (ref 136–145)
TRIGL SERPL-MCNC: 102 MG/DL (ref 0–150)
VLDLC SERPL CALC-MCNC: 19 MG/DL (ref 5–40)
WBC # BLD AUTO: 8.07 10*3/MM3 (ref 3.4–10.8)

## 2021-05-29 DIAGNOSIS — R44.0 AUDITORY HALLUCINATIONS: ICD-10-CM

## 2021-06-01 ENCOUNTER — OFFICE VISIT (OUTPATIENT)
Dept: FAMILY MEDICINE CLINIC | Facility: CLINIC | Age: 61
End: 2021-06-01

## 2021-06-01 VITALS
DIASTOLIC BLOOD PRESSURE: 88 MMHG | OXYGEN SATURATION: 96 % | WEIGHT: 184 LBS | BODY MASS INDEX: 30.66 KG/M2 | RESPIRATION RATE: 16 BRPM | HEART RATE: 67 BPM | HEIGHT: 65 IN | SYSTOLIC BLOOD PRESSURE: 140 MMHG | TEMPERATURE: 98.9 F

## 2021-06-01 DIAGNOSIS — I10 ESSENTIAL HYPERTENSION: Primary | ICD-10-CM

## 2021-06-01 PROCEDURE — 99213 OFFICE O/P EST LOW 20 MIN: CPT | Performed by: FAMILY MEDICINE

## 2021-06-01 RX ORDER — QUETIAPINE FUMARATE 25 MG/1
12.5 TABLET, FILM COATED ORAL NIGHTLY
Qty: 45 TABLET | Refills: 0 | Status: SHIPPED | OUTPATIENT
Start: 2021-06-01 | End: 2021-08-24 | Stop reason: SDUPTHER

## 2021-06-01 NOTE — PROGRESS NOTES
Subjective   Bryant Delgado is a 61 y.o. male.     61-year-old male with the fatigue      The following portions of the patient's history were reviewed and updated as appropriate: allergies, current medications, past family history, past medical history, past social history, past surgical history and problem list.    Review of Systems   Cardiovascular: Negative for chest pain and leg swelling.   Neurological: Negative for dizziness, numbness and headaches.   Psychiatric/Behavioral:        Still hears noises but does CBT has helped       Objective   Physical Exam  Vitals and nursing note reviewed.   Constitutional:       Appearance: He is obese.   Cardiovascular:      Rate and Rhythm: Normal rate and regular rhythm.      Pulses: Normal pulses.      Heart sounds: Normal heart sounds.   Pulmonary:      Effort: Pulmonary effort is normal.      Breath sounds: Normal breath sounds.   Musculoskeletal:      Right lower leg: No edema.      Left lower leg: No edema.   Neurological:      General: No focal deficit present.      Mental Status: He is alert and oriented to person, place, and time.   Psychiatric:         Mood and Affect: Mood normal.         Behavior: Behavior normal.         Assessment/Plan   Diagnoses and all orders for this visit:    1. Essential hypertension (Primary)         -Reduce Neurontin to 600 twice a day increase Seroquel to a full 25 mg pill-trial basis-refusing COVID-19 shots

## 2021-07-14 ENCOUNTER — OFFICE VISIT (OUTPATIENT)
Dept: FAMILY MEDICINE CLINIC | Facility: CLINIC | Age: 61
End: 2021-07-14

## 2021-07-14 VITALS
WEIGHT: 187.8 LBS | RESPIRATION RATE: 18 BRPM | HEIGHT: 65 IN | SYSTOLIC BLOOD PRESSURE: 166 MMHG | HEART RATE: 56 BPM | OXYGEN SATURATION: 98 % | BODY MASS INDEX: 31.29 KG/M2 | DIASTOLIC BLOOD PRESSURE: 100 MMHG | TEMPERATURE: 98.6 F

## 2021-07-14 DIAGNOSIS — I10 ESSENTIAL HYPERTENSION: ICD-10-CM
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
## 2021-07-14 PROCEDURE — 99213 OFFICE O/P EST LOW 20 MIN: CPT | Performed by: FAMILY MEDICINE

## 2021-07-14 RX ORDER — INDAPAMIDE 2.5 MG/1
2.5 TABLET, FILM COATED ORAL EVERY MORNING
Qty: 90 TABLET | Refills: 3 | Status: SHIPPED | OUTPATIENT
Start: 2021-07-14 | End: 2022-05-23

## 2021-07-14 RX ORDER — DOXYCYCLINE 100 MG/1
100 TABLET ORAL 2 TIMES DAILY
Qty: 20 TABLET | Refills: 0 | Status: SHIPPED | OUTPATIENT
Start: 2021-07-14 | End: 2021-09-29

## 2021-07-14 NOTE — PROGRESS NOTES
Subjective   Bryant Delgado is a 61 y.o. male.     61-year-old male with recent tick bite right neck and high blood pressure      The following portions of the patient's history were reviewed and updated as appropriate: allergies, current medications, past family history, past medical history, past social history, past surgical history and problem list.    Review of Systems   Respiratory: Negative for shortness of breath.    Cardiovascular: Negative for chest pain and leg swelling.        Blood pressure elevating been abusing salt   Skin:        Tick bite right occipital area of neck with lymphadenopathy       Objective   Physical Exam  Vitals and nursing note reviewed.   Constitutional:       Appearance: Normal appearance.   HENT:      Head:      Comments: 4 mm excoriation right neck occipital area consistent with tick removal site  Cardiovascular:      Rate and Rhythm: Normal rate and regular rhythm.      Pulses: Normal pulses.      Heart sounds: Normal heart sounds.   Pulmonary:      Effort: Pulmonary effort is normal.      Breath sounds: Normal breath sounds.   Skin:     Capillary Refill: Capillary refill takes less than 2 seconds.      Findings: No rash.   Neurological:      General: No focal deficit present.      Mental Status: He is alert and oriented to person, place, and time.   Psychiatric:         Mood and Affect: Mood normal.         Behavior: Behavior normal.         Thought Content: Thought content normal.         Judgment: Judgment normal.         Assessment/Plan   Diagnoses and all orders for this visit:    1. Tick bite, initial encounter (Primary)    2. Essential hypertension    Other orders  -     doxycycline (ADOXA) 100 MG tablet; Take 1 tablet by mouth 2 (Two) Times a Day.  Dispense: 20 tablet; Refill: 0  -     indapamide (LOZOL) 2.5 MG tablet; Take 1 tablet by mouth Every Morning.  Dispense: 90 tablet; Refill: 3       Plan-indapamide sent for fluid retention and blood pressure    Plan above  plus return 3 weeks for blood pressure check

## 2021-08-04 ENCOUNTER — OFFICE VISIT (OUTPATIENT)
Dept: FAMILY MEDICINE CLINIC | Facility: CLINIC | Age: 61
End: 2021-08-04

## 2021-08-04 VITALS
WEIGHT: 180.4 LBS | RESPIRATION RATE: 18 BRPM | OXYGEN SATURATION: 97 % | BODY MASS INDEX: 30.06 KG/M2 | HEART RATE: 60 BPM | HEIGHT: 65 IN | TEMPERATURE: 97.1 F | SYSTOLIC BLOOD PRESSURE: 120 MMHG | DIASTOLIC BLOOD PRESSURE: 80 MMHG

## 2021-08-04 DIAGNOSIS — Z23 NEED FOR SHINGLES VACCINE: Primary | ICD-10-CM

## 2021-08-04 DIAGNOSIS — I10 ESSENTIAL HYPERTENSION: ICD-10-CM

## 2021-08-04 DIAGNOSIS — R10.9 FLANK PAIN: ICD-10-CM

## 2021-08-04 PROCEDURE — 81003 URINALYSIS AUTO W/O SCOPE: CPT | Performed by: FAMILY MEDICINE

## 2021-08-04 PROCEDURE — 99213 OFFICE O/P EST LOW 20 MIN: CPT | Performed by: FAMILY MEDICINE

## 2021-08-04 RX ORDER — METOPROLOL SUCCINATE 50 MG/1
25 TABLET, EXTENDED RELEASE ORAL 2 TIMES DAILY
COMMUNITY
End: 2022-10-03

## 2021-08-04 NOTE — PROGRESS NOTES
Subjective   Bryant Delgado is a 61 y.o. male.     61-year-old male with history of hypertension and right-sided flank pain      The following portions of the patient's history were reviewed and updated as appropriate: allergies, current medications, past family history, past medical history, past social history, past surgical history and problem list.    Review of Systems   Constitutional: Negative for chills and fever.   Respiratory: Negative for shortness of breath.    Cardiovascular: Negative for chest pain and leg swelling.   Genitourinary: Negative for difficulty urinating.       Objective   Physical Exam  Vitals and nursing note reviewed.   Constitutional:       Appearance: Normal appearance.   Cardiovascular:      Rate and Rhythm: Normal rate and regular rhythm.      Pulses: Normal pulses.      Heart sounds: Normal heart sounds.   Pulmonary:      Effort: Pulmonary effort is normal.      Breath sounds: Normal breath sounds.   Abdominal:      Tenderness: There is no right CVA tenderness or left CVA tenderness.   Musculoskeletal:      Right lower leg: No edema.      Left lower leg: No edema.   Skin:     General: Skin is warm and dry.   Neurological:      General: No focal deficit present.      Mental Status: He is alert and oriented to person, place, and time.   Psychiatric:         Mood and Affect: Mood normal.         Behavior: Behavior normal.         Thought Content: Thought content normal.         Judgment: Judgment normal.         Assessment/Plan   Diagnoses and all orders for this visit:    1. Need for shingles vaccine (Primary)  -     Zoster Vac Recomb Adjuvanted 50 MCG/0.5ML reconstituted suspension; Inject 0.5 mL into the appropriate muscle as directed by prescriber 1 (One) Time for 1 dose.  Dispense: 1 each; Refill: 1    2. Flank pain  -     POC Urinalysis Dipstick, Multipro    3. Essential hypertension    Plan above-Covid vaccine advised again

## 2021-08-24 DIAGNOSIS — R44.0 AUDITORY HALLUCINATIONS: ICD-10-CM

## 2021-08-24 NOTE — TELEPHONE ENCOUNTER
Spoke with patient and he claims hes been on seroquel for 2 years now and the side effects the pharmacy gives him with his Rx are getting worse with him.  He is wanting to change medicine.  He was just seen and says he doesn't want to have to come back in and he already has a follow up appt next month.

## 2021-08-24 NOTE — TELEPHONE ENCOUNTER
"    Caller: Bryant Delgado    Relationship: Self    Best call back number: [phone]     What medication are you requesting: NEW MEDICATION FOR SLEEPING    What are your current symptoms: HEADACHES, CONGESTION AND \"TOO SLEEPY\", PAIN ABOVE BOTH EARS    How long have you been experiencing symptoms: 2 YEARS---SYMPTOMS INCREASE IN SEVERITY    Have you had these symptoms before:    [] Yes  [] No    Have you been treated for these symptoms before:   [] Yes  [] No    If a prescription is needed, what is your preferred pharmacy and phone number:    [facility] [address] [place] [address] [address] \[facility] [place] [facility]\"" [phone] [facility] [phone] FX      PLEASE ADVISE.    Additional notes:          "

## 2021-08-24 NOTE — TELEPHONE ENCOUNTER
Patient called back and he talked for about 30 minutes about his medications and now hes saying the seroquel works if he takes a whole 25mg at bedtime and again during the day and it cuts out the static noise he hears and he wants 90 day supplies because he has to pay the same amount regardless of how many pills he gets.    Has CPE with Dr. Villanueva on 09/07/2021

## 2021-08-25 RX ORDER — QUETIAPINE FUMARATE 25 MG/1
25 TABLET, FILM COATED ORAL 2 TIMES DAILY
Qty: 180 TABLET | Refills: 0 | Status: SHIPPED | OUTPATIENT
Start: 2021-08-25 | End: 2021-11-01 | Stop reason: SDUPTHER

## 2021-08-31 ENCOUNTER — TELEPHONE (OUTPATIENT)
Dept: FAMILY MEDICINE CLINIC | Facility: CLINIC | Age: 61
End: 2021-08-31

## 2021-08-31 NOTE — TELEPHONE ENCOUNTER
He can slowly titrate up to 200 mg a day-once he finds the right dose we can change the strength so he did not have to take so many pills

## 2021-08-31 NOTE — TELEPHONE ENCOUNTER
CURRENTLY TAKING (1) GABAPENTIN 600MG AT BEDTIME.  ON SEROQUEL 25MG (1/2) TAB @  8AM (1/2) TAB AT LUNCH  (1/2) TAB AT 4PM AND (1) QHS.  PT STATES ITS AROUND 75MG DAILY HE TAKES DAILY AND STATES THAT IS HELPING WITH THE NOISE.  PT INQUIRING WHAT IS THE MAXIMUM DOSE HE CAN TAKE IN 24 HR PERIOD?

## 2021-09-07 RX ORDER — LISINOPRIL 20 MG/1
TABLET ORAL
Qty: 180 TABLET | Refills: 0 | Status: SHIPPED | OUTPATIENT
Start: 2021-09-07 | End: 2021-10-01

## 2021-09-07 NOTE — TELEPHONE ENCOUNTER
Rx Refill Note  Requested Prescriptions     Pending Prescriptions Disp Refills   • lisinopril (PRINIVIL,ZESTRIL) 20 MG tablet [Pharmacy Med Name: Lisinopril 20 MG Oral Tablet] 180 tablet 0     Sig: Take 1 tablet by mouth twice daily      Last office visit with prescribing clinician: 8/4/2021      Next office visit with prescribing clinician: 9/7/2021            Cristin Ivy MA  09/07/21, 07:34 CDT

## 2021-09-29 ENCOUNTER — OFFICE VISIT (OUTPATIENT)
Dept: FAMILY MEDICINE CLINIC | Facility: CLINIC | Age: 61
End: 2021-09-29

## 2021-09-29 VITALS
DIASTOLIC BLOOD PRESSURE: 73 MMHG | TEMPERATURE: 99.3 F | HEIGHT: 65 IN | SYSTOLIC BLOOD PRESSURE: 110 MMHG | OXYGEN SATURATION: 99 % | BODY MASS INDEX: 29.09 KG/M2 | WEIGHT: 174.6 LBS | HEART RATE: 66 BPM

## 2021-09-29 DIAGNOSIS — R73.9 HYPERGLYCEMIA: ICD-10-CM

## 2021-09-29 DIAGNOSIS — E78.2 MIXED HYPERLIPIDEMIA: ICD-10-CM

## 2021-09-29 DIAGNOSIS — Z23 NEEDS FLU SHOT: ICD-10-CM

## 2021-09-29 DIAGNOSIS — Z12.5 SPECIAL SCREENING FOR MALIGNANT NEOPLASM OF PROSTATE: ICD-10-CM

## 2021-09-29 DIAGNOSIS — Z00.00 MEDICARE ANNUAL WELLNESS VISIT, SUBSEQUENT: ICD-10-CM

## 2021-09-29 DIAGNOSIS — R53.83 FATIGUE, UNSPECIFIED TYPE: Primary | ICD-10-CM

## 2021-09-29 LAB
BILIRUB BLD-MCNC: NEGATIVE MG/DL
CLARITY, POC: CLEAR
COLOR UR: YELLOW
GLUCOSE UR STRIP-MCNC: NEGATIVE MG/DL
KETONES UR QL: NEGATIVE
LEUKOCYTE EST, POC: NEGATIVE
NITRITE UR-MCNC: NEGATIVE MG/ML
PH UR: 8.5 [PH] (ref 5–8)
PROT UR STRIP-MCNC: NEGATIVE MG/DL
RBC # UR STRIP: NEGATIVE /UL
SP GR UR: 1.02 (ref 1–1.03)
UROBILINOGEN UR QL: NORMAL

## 2021-09-29 PROCEDURE — 90686 IIV4 VACC NO PRSV 0.5 ML IM: CPT | Performed by: FAMILY MEDICINE

## 2021-09-29 PROCEDURE — 81003 URINALYSIS AUTO W/O SCOPE: CPT | Performed by: FAMILY MEDICINE

## 2021-09-29 PROCEDURE — G0008 ADMIN INFLUENZA VIRUS VAC: HCPCS | Performed by: FAMILY MEDICINE

## 2021-09-29 PROCEDURE — G0439 PPPS, SUBSEQ VISIT: HCPCS | Performed by: FAMILY MEDICINE

## 2021-09-29 NOTE — PROGRESS NOTES
The ABCs of the Annual Wellness Visit  Subsequent Medicare Wellness Visit    Chief Complaint   Patient presents with   • Medicare Wellness-subsequent     fasting      Subjective    History of Present Illness:  Bryant Delgado is a 61 y.o. male who presents for a Subsequent Medicare Wellness Visit.    The following portions of the patient's history were reviewed and   updated as appropriate: allergies, current medications, past family history, past medical history, past social history, past surgical history and problem list.    Compared to one year ago, the patient feels his physical   health is better.    Compared to one year ago, the patient feels his mental   health is better.    Recent Hospitalizations:  He was not admitted to the hospital during the last year.       Current Medical Providers:  Patient Care Team:  Bennett Villanueva MD as PCP - General (Family Medicine)    Outpatient Medications Prior to Visit   Medication Sig Dispense Refill   • aspirin 81 MG EC tablet Take 81 mg by mouth Daily.     • CBD (cannabidiol) oral oil Take  by mouth Daily. 60 mg Gummies     • Cholecalciferol (VITAMIN D3) 125 MCG (5000 UT) capsule capsule Take 5,000 Units by mouth Daily.     • Cyanocobalamin (VITAMIN B 12) 500 MCG tablet Take 1,000 mcg by mouth Daily.     • gabapentin (NEURONTIN) 600 MG tablet 4 (Four) Times a Day.  2   • indapamide (LOZOL) 2.5 MG tablet Take 1 tablet by mouth Every Morning. 90 tablet 3   • lisinopril (PRINIVIL,ZESTRIL) 20 MG tablet Take 1 tablet by mouth twice daily 180 tablet 0   • Methylcobalamin 1 MG chewable tablet once a day     • metoprolol succinate XL (TOPROL-XL) 50 MG 24 hr tablet Take 25 mg by mouth 2 (two) times a day.     • oxyCODONE-acetaminophen (PERCOCET) 7.5-325 MG per tablet Take 1 tablet by mouth Every 6 (Six) Hours As Needed.     • pravastatin (PRAVACHOL) 40 MG tablet TAKE 1 TABLET BY MOUTH AT BEDTIME 90 tablet 3   • QUEtiapine (SEROquel) 25 MG tablet Take 1 tablet by mouth 2  "(two) times a day. (Patient taking differently: Take 25 mg by mouth 3 (Three) Times a Day.) 180 tablet 0   • doxycycline (ADOXA) 100 MG tablet Take 1 tablet by mouth 2 (Two) Times a Day. 20 tablet 0     No facility-administered medications prior to visit.       Opioid medication/s are on active medication list.  and I have evaluated his active treatment plan and pain score trends (see table).  Vitals:    09/29/21 1002   PainSc:   5   PainLoc: Back     I have reviewed the chart for potential of high risk medication and harmful drug interactions in the elderly.            Aspirin is on active medication list. Aspirin use is indicated based on review of current medical condition/s. Pros and cons of this therapy have been discussed today. Benefits of this medication outweigh potential harm.  Patient has been encouraged to continue taking this medication.  .      Patient Active Problem List   Diagnosis   • Sensorineural hearing loss (SNHL) of both ears   • Tinnitus of both ears   • Auditory hallucinations     Advance Care Planning  Advance Directive is not on file.  ACP discussion was declined by the patient. Patient has an advance directive (not in EMR), copy requested.    Review of Systems   Respiratory: Negative for shortness of breath.         PC had Covid with wife first of the month-Covid test at the hospital was positive   Cardiovascular: Negative for chest pain and leg swelling.   Gastrointestinal:        Cologuard test still in affect   Neurological:        Auditory hallucinations are better   All other systems reviewed and are negative.       Objective    Vitals:    09/29/21 1002   BP: 110/73   BP Location: Left arm   Patient Position: Sitting   Cuff Size: Adult   Pulse: 66   Temp: 99.3 °F (37.4 °C)   TempSrc: Temporal   SpO2: 99%   Weight: 79.2 kg (174 lb 9.6 oz)   Height: 165.1 cm (65\")   PainSc:   5   PainLoc: Back     BMI Readings from Last 1 Encounters:   09/29/21 29.05 kg/m²   BMI is above normal " parameters. Recommendations include: nutrition counseling    Does the patient have evidence of cognitive impairment? No    Physical Exam  Vitals and nursing note reviewed.   Constitutional:       Appearance: Normal appearance.   HENT:      Right Ear: Tympanic membrane and ear canal normal.      Left Ear: Tympanic membrane and ear canal normal.   Eyes:      Extraocular Movements: Extraocular movements intact.      Pupils: Pupils are equal, round, and reactive to light.   Neck:      Vascular: No carotid bruit.   Cardiovascular:      Rate and Rhythm: Normal rate and regular rhythm.      Pulses: Normal pulses.   Pulmonary:      Effort: Pulmonary effort is normal.      Breath sounds: Normal breath sounds.   Abdominal:      General: Abdomen is flat.      Palpations: Abdomen is soft. There is no mass.      Tenderness: There is no abdominal tenderness.   Genitourinary:     Penis: Normal.       Testes: Normal.      Prostate: Normal.      Comments: No testicular masses inguinal hernia or adenopathy-prostate 2+ no nodules  Musculoskeletal:      Right lower leg: No edema.      Left lower leg: No edema.   Lymphadenopathy:      Cervical: No cervical adenopathy.   Skin:     General: Skin is warm.      Capillary Refill: Capillary refill takes less than 2 seconds.   Neurological:      General: No focal deficit present.      Mental Status: He is alert and oriented to person, place, and time.   Psychiatric:         Mood and Affect: Mood normal.         Behavior: Behavior normal.         Thought Content: Thought content normal.         Judgment: Judgment normal.                 HEALTH RISK ASSESSMENT    Smoking Status:  Social History     Tobacco Use   Smoking Status Never Smoker   Smokeless Tobacco Current User   • Types: Snuff     Alcohol Consumption:  Social History     Substance and Sexual Activity   Alcohol Use No     Fall Risk Screen:    STEADI Fall Risk Assessment has not been completed.    Depression Screening:  PHQ-2/PHQ-9  Depression Screening 9/29/2021   Little interest or pleasure in doing things 0   Feeling down, depressed, or hopeless 0   Trouble falling or staying asleep, or sleeping too much -   Feeling tired or having little energy -   Poor appetite or overeating -   Feeling bad about yourself - or that you are a failure or have let yourself or your family down -   Trouble concentrating on things, such as reading the newspaper or watching television -   Moving or speaking so slowly that other people could have noticed. Or the opposite - being so fidgety or restless that you have been moving around a lot more than usual -   Thoughts that you would be better off dead, or of hurting yourself in some way -   Total Score 0   If you checked off any problems, how difficult have these problems made it for you to do your work, take care of things at home, or get along with other people? -       Health Habits and Functional and Cognitive Screening:  Functional & Cognitive Status 9/29/2021   Do you have difficulty preparing food and eating? No   Do you have difficulty bathing yourself, getting dressed or grooming yourself? No   Do you have difficulty using the toilet? No   Do you have difficulty moving around from place to place? No   Do you have trouble with steps or getting out of a bed or a chair? No   Current Diet Unhealthy Diet   Dental Exam Up to date   Eye Exam Up to date   Exercise (times per week) 0 times per week   Current Exercises Include No Regular Exercise   Current Exercise Activities Include -   Do you need help using the phone?  No   Are you deaf or do you have serious difficulty hearing?  Yes   Do you need help with transportation? No   Do you need help shopping? No   Do you need help preparing meals?  No   Do you need help with housework?  No   Do you need help with laundry? No   Do you need help taking your medications? No   Do you need help managing money? No   Do you ever drive or ride in a car without wearing a seat  belt? No   Have you felt unusual stress, anger or loneliness in the last month? No   Who do you live with? Spouse   If you need help, do you have trouble finding someone available to you? No   Have you been bothered in the last four weeks by sexual problems? No   Do you have difficulty concentrating, remembering or making decisions? No       Age-appropriate Screening Schedule:  Refer to the list below for future screening recommendations based on patient's age, sex and/or medical conditions. Orders for these recommended tests are listed in the plan section. The patient has been provided with a written plan.    Health Maintenance   Topic Date Due   • ZOSTER VACCINE (1 of 2) 08/10/2022 (Originally 2/24/2010)   • INFLUENZA VACCINE  10/01/2021   • LIPID PANEL  03/01/2022   • TDAP/TD VACCINES  Discontinued              Assessment/Plan   CMS Preventative Services Quick Reference  Risk Factors Identified During Encounter  Fall Risk-High or Moderate  Inactivity/Sedentary  The above risks/problems have been discussed with the patient.  Follow up actions/plans if indicated are seen below in the Assessment/Plan Section.  Pertinent information has been shared with the patient in the After Visit Summary.    Diagnoses and all orders for this visit:    1. Fatigue, unspecified type (Primary)  -     TSH  -     T4, free  -     CBC & Differential    2. Mixed hyperlipidemia  -     Comprehensive Metabolic Panel  -     Lipid Panel    3. Special screening for malignant neoplasm of prostate  -     PSA Screen    4. Hyperglycemia  -     POC Urinalysis Dipstick, Multipro  -     Hemoglobin A1c    5. Medicare annual wellness visit, subsequent    6. Needs flu shot  -     FluLaval/Fluarix (VFC) >6 Months    Other orders  -     Cancel: Hemoglobin A1c        Follow Up:   Return in 6 months (on 3/29/2022), or if symptoms worsen or fail to improve.     An After Visit Summary and PPPS were made available to the patient.        I spent 25 minutes  caring for Bryant on this date of service. This time includes time spent by me in the following activities:preparing for the visit, reviewing tests, obtaining and/or reviewing a separately obtained history, performing a medically appropriate examination and/or evaluation , counseling and educating the patient/family/caregiver and ordering medications, tests, or procedures     If Covid antibodies are present should wait 3 months after 1 September Covid exposure for vaccine

## 2021-09-29 NOTE — PATIENT INSTRUCTIONS
Medicare Wellness  Personal Prevention Plan of Service     Date of Office Visit:    Encounter Provider:  Bennett Villanueva MD  Place of Service:  [facility]  Patient Name: Bryant Delgado  :  1960    As part of the Medicare Wellness portion of your visit today, we are providing you with this personalized preventive plan of services (PPPS). This plan is based upon recommendations of the [place] Preventive Services Task Force (USPSTF) and the Advisory Committee on Immunization Practices (ACIP).    This lists the preventive care services that should be considered, and provides dates of when you are due. Items listed as completed are up-to-date and do not require any further intervention.    Health Maintenance   Topic Date Due   • ANNUAL WELLNESS VISIT  2021   • COVID-19 Vaccine (1) 08/10/2022 (Originally 1972)   • ZOSTER VACCINE (1 of 2) 08/10/2022 (Originally 2010)   • INFLUENZA VACCINE  10/01/2021   • LIPID PANEL  2022   • COLORECTAL CANCER SCREENING  2028   • HEPATITIS C SCREENING  Completed   • Pneumococcal Vaccine 0-64  Aged Out   • TDAP/TD VACCINES  Discontinued       No orders of the defined types were placed in this encounter.      No follow-ups on file.        Mediterranean Diet  A Mediterranean diet refers to food and lifestyle choices that are based on the traditions of countries located on the [place]. This way of eating has been shown to help prevent certain conditions and improve outcomes for people who have chronic diseases, like kidney disease and heart disease.  What are tips for following this plan?  Lifestyle  · Cook and eat meals together with your family, when possible.  · Drink enough fluid to keep your urine clear or pale yellow.  · Be physically active every day. This includes:  ? Aerobic exercise like running or swimming.  ? Leisure activities like gardening, walking, or housework.  · Get 7-8 hours  of sleep each night.  · If recommended by your health care provider, drink red wine in moderation. This means 1 glass a day for nonpregnant women and 2 glasses a day for men. A glass of wine equals 5 oz (150 mL).  Reading food labels    · Check the serving size of packaged foods. For foods such as rice and pasta, the serving size refers to the amount of cooked product, not dry.  · Check the total fat in packaged foods. Avoid foods that have saturated fat or trans fats.  · Check the ingredients list for added sugars, such as corn syrup.    Shopping  · At the grocery store, buy most of your food from the areas near the walls of the store. This includes:  ? Fresh fruits and vegetables (produce).  ? Grains, beans, nuts, and seeds. Some of these may be available in unpackaged forms or large amounts (in bulk).  ? Fresh seafood.  ? Poultry and eggs.  ? Low-fat dairy products.  · Buy whole ingredients instead of prepackaged foods.  · Buy fresh fruits and vegetables in-season from local [employer] markets.  · Buy frozen fruits and vegetables in resealable bags.  · If you do not have access to quality fresh seafood, buy precooked frozen shrimp or canned fish, such as tuna, salmon, or sardines.  · Buy small amounts of raw or cooked vegetables, salads, or olives from the deli or salad bar at your store.  · Stock your pantry so you always have certain foods on hand, such as olive oil, canned tuna, canned tomatoes, rice, pasta, and beans.  Cooking  · Cook foods with extra-virgin olive oil instead of using butter or other vegetable oils.  · Have meat as a side dish, and have vegetables or grains as your main dish. This means having meat in small portions or adding small amounts of meat to foods like pasta or stew.  · Use beans or vegetables instead of meat in common dishes like chili or lasagna.  · Ringo with different cooking methods. Try roasting or broiling vegetables instead of steaming or sautéeing them.  · Add frozen  vegetables to soups, stews, pasta, or rice.  · Add nuts or seeds for added healthy fat at each meal. You can add these to yogurt, salads, or vegetable dishes.  · Marinate fish or vegetables using olive oil, lemon juice, garlic, and fresh herbs.  Meal planning    · Plan to eat 1 vegetarian meal one day each week. Try to work up to 2 vegetarian meals, if possible.  · Eat seafood 2 or more times a week.  · Have healthy snacks readily available, such as:  ? Vegetable sticks with hummus.  ? Greek yogurt.  ? Fruit and nut trail mix.  · Eat balanced meals throughout the week. This includes:  ? Fruit: 2-3 servings a day  ? Vegetables: 4-5 servings a day  ? Low-fat dairy: 2 servings a day  ? Fish, poultry, or lean meat: 1 serving a day  ? Beans and legumes: 2 or more servings a week  ? Nuts and seeds: 1-2 servings a day  ? Whole grains: 6-8 servings a day  ? Extra-virgin olive oil: 3-4 servings a day  · Limit red meat and sweets to only a few servings a month    What are my food choices?  · Mediterranean diet  ? Recommended  § Grains: Whole-grain pasta. Brown rice. Bulgar wheat. Polenta. Couscous. Whole-wheat bread. Oatmeal. Quinoa.  § Vegetables: Artichokes. Beets. Broccoli. Cabbage. Carrots. Eggplant. Green beans. Chard. Kale. Spinach. Onions. Leeks. Peas. Squash. Tomatoes. Peppers. Radishes.  § Fruits: Apples. Apricots. Avocado. Berries. Bananas. Cherries. Dates. Figs. Grapes. Srinath. Melon. Oranges. Peaches. Plums. Pomegranate.  § Meats and other protein foods: Beans. Almonds. Sunflower seeds. Pine nuts. Peanuts. Cod. [place]. Scallops. Shrimp. Tuna. Tilapia. Clams. Oysters. Eggs.  § Dairy: Low-fat milk. Cheese. Greek yogurt.  § Beverages: Water. Red wine. Herbal tea.  § Fats and oils: Extra virgin olive oil. Avocado oil. Grape seed oil.  § Sweets and desserts: Greek yogurt with honey. Baked apples. Poached pears. Trail mix.  § Seasoning and other foods: Basil. Cilantro. Coriander. Cumin. Mint. Parsley. Eddie. Rosemary.  Tarragon. Garlic. Oregano. Thyme. Pepper. Balsalmic vinegar. Tahini. Hummus. Tomato sauce. Olives. Mushrooms.  ? Limit these  § Grains: Prepackaged pasta or rice dishes. Prepackaged cereal with added sugar.  § Vegetables: Deep fried potatoes (french fries).  § Fruits: Fruit canned in syrup.  § Meats and other protein foods: Beef. Pork. Lamb. Poultry with skin. Hot dogs. Taylor.  § Dairy: Ice cream. Sour cream. Whole milk.  § Beverages: Juice. Sugar-sweetened soft drinks. Beer. Liquor and spirits.  § Fats and oils: Butter. Canola oil. Vegetable oil. Beef fat (tallow). Lard.  § Sweets and desserts: Cookies. Cakes. Pies. Candy.  § Seasoning and other foods: Mayonnaise. Premade sauces and marinades.  The items listed may not be a complete list. Talk with your dietitian about what dietary choices are right for you.  Summary  · The Mediterranean diet includes both food and lifestyle choices.  · Eat a variety of fresh fruits and vegetables, beans, nuts, seeds, and whole grains.  · Limit the amount of red meat and sweets that you eat.  · Talk with your health care provider about whether it is safe for you to drink red wine in moderation. This means 1 glass a day for nonpregnant women and 2 glasses a day for men. A glass of wine equals 5 oz (150 mL).  This information is not intended to replace advice given to you by your health care provider. Make sure you discuss any questions you have with your health care provider.  Document Revised: 08/17/2017 Document Reviewed: 08/10/2017  Elsevier Patient Education © 2020 [employer]

## 2021-09-30 LAB
ALBUMIN SERPL-MCNC: 4.6 G/DL (ref 3.5–5.2)
ALBUMIN/GLOB SERPL: 2.3 G/DL
ALP SERPL-CCNC: 49 U/L (ref 39–117)
ALT SERPL-CCNC: 11 U/L (ref 1–41)
AST SERPL-CCNC: 15 U/L (ref 1–40)
BASOPHILS # BLD AUTO: 0.04 10*3/MM3 (ref 0–0.2)
BASOPHILS NFR BLD AUTO: 0.6 % (ref 0–1.5)
BILIRUB SERPL-MCNC: 0.4 MG/DL (ref 0–1.2)
BUN SERPL-MCNC: 5 MG/DL (ref 8–23)
BUN/CREAT SERPL: 4.6 (ref 7–25)
CALCIUM SERPL-MCNC: 10.5 MG/DL (ref 8.6–10.5)
CHLORIDE SERPL-SCNC: 103 MMOL/L (ref 98–107)
CHOLEST SERPL-MCNC: 123 MG/DL (ref 0–200)
CO2 SERPL-SCNC: 28.8 MMOL/L (ref 22–29)
CREAT SERPL-MCNC: 1.08 MG/DL (ref 0.76–1.27)
EOSINOPHIL # BLD AUTO: 0.11 10*3/MM3 (ref 0–0.4)
EOSINOPHIL NFR BLD AUTO: 1.8 % (ref 0.3–6.2)
ERYTHROCYTE [DISTWIDTH] IN BLOOD BY AUTOMATED COUNT: 12.8 % (ref 12.3–15.4)
GLOBULIN SER CALC-MCNC: 2 GM/DL
GLUCOSE SERPL-MCNC: 97 MG/DL (ref 65–99)
HBA1C MFR BLD: 6.03 % (ref 4.8–5.6)
HCT VFR BLD AUTO: 37.8 % (ref 37.5–51)
HDLC SERPL-MCNC: 39 MG/DL (ref 40–60)
HGB BLD-MCNC: 12.2 G/DL (ref 13–17.7)
IMM GRANULOCYTES # BLD AUTO: 0.03 10*3/MM3 (ref 0–0.05)
IMM GRANULOCYTES NFR BLD AUTO: 0.5 % (ref 0–0.5)
LDLC SERPL CALC-MCNC: 65 MG/DL (ref 0–100)
LYMPHOCYTES # BLD AUTO: 1.46 10*3/MM3 (ref 0.7–3.1)
LYMPHOCYTES NFR BLD AUTO: 23.4 % (ref 19.6–45.3)
MCH RBC QN AUTO: 30.8 PG (ref 26.6–33)
MCHC RBC AUTO-ENTMCNC: 32.3 G/DL (ref 31.5–35.7)
MCV RBC AUTO: 95.5 FL (ref 79–97)
MONOCYTES # BLD AUTO: 0.69 10*3/MM3 (ref 0.1–0.9)
MONOCYTES NFR BLD AUTO: 11.1 % (ref 5–12)
NEUTROPHILS # BLD AUTO: 3.91 10*3/MM3 (ref 1.7–7)
NEUTROPHILS NFR BLD AUTO: 62.6 % (ref 42.7–76)
NRBC BLD AUTO-RTO: 0 /100 WBC (ref 0–0.2)
PLATELET # BLD AUTO: 230 10*3/MM3 (ref 140–450)
POTASSIUM SERPL-SCNC: 4.8 MMOL/L (ref 3.5–5.2)
PROT SERPL-MCNC: 6.6 G/DL (ref 6–8.5)
PSA SERPL-MCNC: 0.92 NG/ML (ref 0–4)
RBC # BLD AUTO: 3.96 10*6/MM3 (ref 4.14–5.8)
SODIUM SERPL-SCNC: 141 MMOL/L (ref 136–145)
T4 FREE SERPL-MCNC: 1.02 NG/DL (ref 0.93–1.7)
TRIGL SERPL-MCNC: 104 MG/DL (ref 0–150)
TSH SERPL DL<=0.005 MIU/L-ACNC: 0.7 UIU/ML (ref 0.27–4.2)
VLDLC SERPL CALC-MCNC: 19 MG/DL (ref 5–40)
WBC # BLD AUTO: 6.24 10*3/MM3 (ref 3.4–10.8)

## 2021-10-01 LAB
FOLATE SERPL-MCNC: 11.7 NG/ML (ref 4.78–24.2)
IRON SATN MFR SERPL: 34 % (ref 20–50)
IRON SERPL-MCNC: 130 MCG/DL (ref 59–158)
Lab: NORMAL
TIBC SERPL-MCNC: 379 MCG/DL
UIBC SERPL-MCNC: 249 MCG/DL (ref 112–346)
VIT B12 SERPL-MCNC: >2000 PG/ML (ref 211–946)
WRITTEN AUTHORIZATION: NORMAL

## 2021-10-01 RX ORDER — PRAVASTATIN SODIUM 40 MG
TABLET ORAL
Qty: 90 TABLET | Refills: 3 | Status: SHIPPED | OUTPATIENT
Start: 2021-10-01 | End: 2022-10-12 | Stop reason: DRUGHIGH

## 2021-10-01 RX ORDER — LISINOPRIL 20 MG/1
TABLET ORAL
Qty: 180 TABLET | Refills: 3 | Status: SHIPPED | OUTPATIENT
Start: 2021-10-01 | End: 2022-12-05 | Stop reason: SDUPTHER

## 2021-10-01 NOTE — TELEPHONE ENCOUNTER
Rx Refill Note  Requested Prescriptions     Pending Prescriptions Disp Refills   • pravastatin (PRAVACHOL) 40 MG tablet [Pharmacy Med Name: Pravastatin Sodium 40 MG Oral Tablet] 90 tablet 0     Sig: TAKE 1 TABLET BY MOUTH AT BEDTIME   • lisinopril (PRINIVIL,ZESTRIL) 20 MG tablet [Pharmacy Med Name: Lisinopril 20 MG Oral Tablet] 180 tablet 0     Sig: Take 1 tablet by mouth twice daily      Last office visit with prescribing clinician: 9/29/2021      Next office visit with prescribing clinician: 3/29/2022            Cristin Ivy MA  10/01/21, 11:37 CDT

## 2021-11-01 DIAGNOSIS — R44.0 AUDITORY HALLUCINATIONS: ICD-10-CM

## 2021-11-01 RX ORDER — QUETIAPINE FUMARATE 25 MG/1
25 TABLET, FILM COATED ORAL 4 TIMES DAILY
Qty: 360 TABLET | Refills: 2 | Status: SHIPPED | OUTPATIENT
Start: 2021-11-01 | End: 2022-10-03

## 2021-11-01 NOTE — TELEPHONE ENCOUNTER
Patient's wife called and said rx with correct directions needs to be sent to pharmacy.  States DrSarah Ed increase to four times daily.    Rx Refill Note  Requested Prescriptions     Pending Prescriptions Disp Refills   • QUEtiapine (SEROquel) 25 MG tablet 360 tablet 2     Sig: Take 1 tablet by mouth 4 (Four) Times a Day.      Last office visit with prescribing clinician: 9/29/2021      Next office visit with prescribing clinician: 3/29/2022            Cristin Ivy MA  11/01/21, 11:27 CDT

## 2021-11-15 ENCOUNTER — CLINICAL SUPPORT (OUTPATIENT)
Dept: FAMILY MEDICINE CLINIC | Facility: CLINIC | Age: 61
End: 2021-11-15

## 2021-11-15 DIAGNOSIS — R31.9 HEMATURIA, UNSPECIFIED TYPE: ICD-10-CM

## 2021-11-15 DIAGNOSIS — R31.0 GROSS HEMATURIA: Primary | ICD-10-CM

## 2021-11-15 PROCEDURE — 81003 URINALYSIS AUTO W/O SCOPE: CPT | Performed by: FAMILY MEDICINE

## 2021-11-15 NOTE — PROGRESS NOTES
Patient noticed a strand of blood in his urine over the weekend.  He had been having lower back pain and since urinating his back has felt better.  Today's urinalysis was normal and patient was informed he might have passed a very small kidney stone.  No Rx is needed at this time.  Patient was informed to call office back if problem continues.

## 2022-01-19 ENCOUNTER — TRANSCRIBE ORDERS (OUTPATIENT)
Dept: ADMINISTRATIVE | Facility: HOSPITAL | Age: 62
End: 2022-01-19

## 2022-01-19 ENCOUNTER — HOSPITAL ENCOUNTER (OUTPATIENT)
Dept: GENERAL RADIOLOGY | Facility: HOSPITAL | Age: 62
Discharge: HOME | End: 2022-01-19
Admitting: NURSE PRACTITIONER

## 2022-01-19 DIAGNOSIS — M54.12 CERVICAL RADICULITIS: Primary | ICD-10-CM

## 2022-01-19 PROCEDURE — 72050 X-RAY EXAM NECK SPINE 4/5VWS: CPT

## 2022-02-14 ENCOUNTER — HOSPITAL ENCOUNTER (OUTPATIENT)
Dept: MRI IMAGING | Facility: HOSPITAL | Age: 62
Discharge: HOME | End: 2022-02-14
Admitting: NURSE PRACTITIONER

## 2022-02-14 DIAGNOSIS — M54.12 CERVICAL RADICULITIS: ICD-10-CM

## 2022-02-14 PROCEDURE — 72141 MRI NECK SPINE W/O DYE: CPT

## 2022-03-18 RX ORDER — METOPROLOL SUCCINATE 25 MG/1
25 TABLET, EXTENDED RELEASE ORAL 2 TIMES DAILY
Qty: 180 TABLET | Refills: 2 | Status: SHIPPED | OUTPATIENT
Start: 2022-03-18 | End: 2022-12-05 | Stop reason: DRUGHIGH

## 2022-03-18 NOTE — TELEPHONE ENCOUNTER
Rx Refill Note  Requested Prescriptions     Pending Prescriptions Disp Refills   • metoprolol succinate XL (TOPROL-XL) 25 MG 24 hr tablet [Pharmacy Med Name: Metoprolol Succinate ER 25 MG Oral Tablet Extended Release 24 Hour] 180 tablet 2     Sig: Take 1 tablet by mouth 2 (Two) Times a Day.      Last office visit with prescribing clinician: 9/29/2021      Next office visit with prescribing clinician: 3/29/2022            Cristin Ivy MA  03/18/22, 07:29 CDT

## 2022-03-29 ENCOUNTER — TELEPHONE (OUTPATIENT)
Dept: FAMILY MEDICINE CLINIC | Facility: CLINIC | Age: 62
End: 2022-03-29

## 2022-03-29 NOTE — TELEPHONE ENCOUNTER
Phone attempt for NO showt--no answer to home or mobile--phones turned off and unable to leave messages.  Will mail letter.

## 2022-05-23 RX ORDER — INDAPAMIDE 2.5 MG/1
TABLET, FILM COATED ORAL
Qty: 90 TABLET | Refills: 1 | Status: SHIPPED | OUTPATIENT
Start: 2022-05-23 | End: 2022-10-03

## 2022-05-23 NOTE — TELEPHONE ENCOUNTER
Rx Refill Note  Requested Prescriptions     Pending Prescriptions Disp Refills   • indapamide (LOZOL) 2.5 MG tablet [Pharmacy Med Name: Indapamide 2.5 MG Oral Tablet] 90 tablet 0     Sig: TAKE 1 TABLET BY MOUTH ONCE DAILY IN THE MORNING      Last office visit with prescribing clinician: 9/29/2021      Next office visit with prescribing clinician: Visit date not found   3/29/22 NO SHOW  {TIP  Encounters:23}    {TIP  Please add Last Relevant Lab 9/29/21    Cristin Ivy MA  05/23/22, 07:37 CDT

## 2022-10-03 ENCOUNTER — OFFICE VISIT (OUTPATIENT)
Dept: FAMILY MEDICINE CLINIC | Facility: CLINIC | Age: 62
End: 2022-10-03

## 2022-10-03 VITALS
OXYGEN SATURATION: 97 % | BODY MASS INDEX: 26.42 KG/M2 | HEIGHT: 65 IN | DIASTOLIC BLOOD PRESSURE: 78 MMHG | WEIGHT: 158.6 LBS | TEMPERATURE: 98.7 F | RESPIRATION RATE: 18 BRPM | HEART RATE: 58 BPM | SYSTOLIC BLOOD PRESSURE: 140 MMHG

## 2022-10-03 DIAGNOSIS — Z12.11 SCREENING FOR COLORECTAL CANCER: ICD-10-CM

## 2022-10-03 DIAGNOSIS — Z00.00 MEDICARE ANNUAL WELLNESS VISIT, SUBSEQUENT: ICD-10-CM

## 2022-10-03 DIAGNOSIS — E78.2 MIXED HYPERLIPIDEMIA: ICD-10-CM

## 2022-10-03 DIAGNOSIS — R53.83 FATIGUE, UNSPECIFIED TYPE: Primary | ICD-10-CM

## 2022-10-03 DIAGNOSIS — Z23 IMMUNIZATION DUE: ICD-10-CM

## 2022-10-03 DIAGNOSIS — R73.9 HYPERGLYCEMIA: ICD-10-CM

## 2022-10-03 DIAGNOSIS — Z12.12 SCREENING FOR COLORECTAL CANCER: ICD-10-CM

## 2022-10-03 DIAGNOSIS — Z12.5 SPECIAL SCREENING FOR MALIGNANT NEOPLASM OF PROSTATE: ICD-10-CM

## 2022-10-03 LAB
BILIRUB BLD-MCNC: NEGATIVE MG/DL
CLARITY, POC: CLEAR
COLOR UR: YELLOW
GLUCOSE UR STRIP-MCNC: NEGATIVE MG/DL
KETONES UR QL: NEGATIVE
LEUKOCYTE EST, POC: NEGATIVE
NITRITE UR-MCNC: NEGATIVE MG/ML
PH UR: 6 [PH] (ref 5–8)
PROT UR STRIP-MCNC: NEGATIVE MG/DL
RBC # UR STRIP: NEGATIVE /UL
SP GR UR: 1.02 (ref 1–1.03)
UROBILINOGEN UR QL: NORMAL

## 2022-10-03 PROCEDURE — 1159F MED LIST DOCD IN RCRD: CPT | Performed by: FAMILY MEDICINE

## 2022-10-03 PROCEDURE — 81003 URINALYSIS AUTO W/O SCOPE: CPT | Performed by: FAMILY MEDICINE

## 2022-10-03 PROCEDURE — G0102 PROSTATE CA SCREENING; DRE: HCPCS | Performed by: FAMILY MEDICINE

## 2022-10-03 PROCEDURE — 1170F FXNL STATUS ASSESSED: CPT | Performed by: FAMILY MEDICINE

## 2022-10-03 PROCEDURE — G0439 PPPS, SUBSEQ VISIT: HCPCS | Performed by: FAMILY MEDICINE

## 2022-10-03 PROCEDURE — 1126F AMNT PAIN NOTED NONE PRSNT: CPT | Performed by: FAMILY MEDICINE

## 2022-10-03 NOTE — PATIENT INSTRUCTIONS
Advance Care Planning and Advance Directives     You make decisions on a daily basis - decisions about where you want to live, your career, your home, your life. Perhaps one of the most important decisions you face is your choice for future medical care. Take time to talk with your family and your healthcare team and start planning today.  Advance Care Planning is a process that can help you:  · Understand possible future healthcare decisions in light of your own experiences  · Reflect on those decision in light of your goals and values  · Discuss your decisions with those closest to you and the healthcare professionals that care for you  · Make a plan by creating a document that reflects your wishes    Surrogate Decision Maker  In the event of a medical emergency, which has left you unable to communicate or to make your own decisions, you would need someone to make decisions for you.  It is important to discuss your preferences for medical treatment with this person while you are in good health.     Qualities of a surrogate decision maker:  • Willing to take on this role and responsibility  • Knows what you want for future medical care  • Willing to follow your wishes even if they don't agree with them  • Able to make difficult medical decisions under stressful circumstances    Advance Directives  These are legal documents you can create that will guide your healthcare team and decision maker(s) when needed. These documents can be stored in the electronic medical record.    · Living Will - a legal document to guide your care if you have a terminal condition or a serious illness and are unable to communicate. States vary by statute in document names/types, but most forms may include one or more of the following:        -  Directions regarding life-prolonging treatments        -  Directions regarding artificially provided nutrition/hydration        -  Choosing a healthcare decision maker        -  Direction  regarding organ/tissue donation    · Durable Power of  for Healthcare - this document names an -in-fact to make medical decisions for you, but it may also allow this person to make personal and financial decisions for you. Please seek the advice of an  if you need this type of document.    **Advance Directives are not required and no one may discriminate against you if you do not sign one.    Medical Orders  Many states allow specific forms/orders signed by your physician to record your wishes for medical treatment in your current state of health. This form, signed in personal communication with your physician, addresses resuscitation and other medical interventions that you may or may not want.      For more information or to schedule a time with a [facility] Advance Care Planning Facilitator contact: [facility] or call [phone] and someone will contact you directly.    Medicare Wellness  Personal Prevention Plan of Service     Date of Office Visit:    Encounter Provider:  Bennett Villanueva MD  Place of Service:  [facility]  Patient Name: Bryant Delgado  :  1960    As part of the Medicare Wellness portion of your visit today, we are providing you with this personalized preventive plan of services (PPPS). This plan is based upon recommendations of the [place] Preventive Services Task Force (USPSTF) and the Advisory Committee on Immunization Practices (ACIP).    This lists the preventive care services that should be considered, and provides dates of when you are due. Items listed as completed are up-to-date and do not require any further intervention.    Health Maintenance   Topic Date Due   • COVID-19 Vaccine (1) Never done   • INFLUENZA VACCINE  2022   • ANNUAL WELLNESS VISIT  2022   • LIPID PANEL  2022   • COLORECTAL CANCER SCREENING  2028   • HEPATITIS C SCREENING  Completed   • ZOSTER VACCINE   Completed   • Pneumococcal Vaccine 0-64  Aged Out   • TDAP/TD VACCINES  Discontinued       Orders Placed This Encounter   Procedures   • Fluzone High-Dose 65+yrs ([id number])       No follow-ups on file.

## 2022-10-03 NOTE — PROGRESS NOTES
The ABCs of the Annual Wellness Visit  Subsequent Medicare Wellness Visit    Chief Complaint   Patient presents with   • Medicare Wellness-subsequent     Fasting      Subjective    History of Present Illness:  Bryant Delgado is a 62 y.o. male who presents for a Subsequent Medicare Wellness Visit.    The following portions of the patient's history were reviewed and   updated as appropriate: allergies, current medications, past family history, past medical history, past social history, past surgical history and problem list.    Compared to one year ago, the patient feels his physical   health is better.    Compared to one year ago, the patient feels his mental   health is better.    Recent Hospitalizations:  He was not admitted to the hospital during the last year.       Current Medical Providers:  Patient Care Team:  Bennett Villanueva MD as PCP - General (Family Medicine)    Outpatient Medications Prior to Visit   Medication Sig Dispense Refill   • Cholecalciferol (VITAMIN D3) 125 MCG (5000 UT) capsule capsule Take 5,000 Units by mouth Daily.     • Cyanocobalamin (VITAMIN B 12) 500 MCG tablet Take 1,000 mcg by mouth Daily.     • gabapentin (NEURONTIN) 600 MG tablet 3 (Three) Times a Day.  2   • lisinopril (PRINIVIL,ZESTRIL) 20 MG tablet Take 1 tablet by mouth twice daily 180 tablet 3   • metoprolol succinate XL (TOPROL-XL) 25 MG 24 hr tablet Take 1 tablet by mouth 2 (Two) Times a Day. (Patient taking differently: Take 50 mg by mouth Every Evening.) 180 tablet 2   • oxyCODONE-acetaminophen (PERCOCET) 7.5-325 MG per tablet Take 1 tablet by mouth Every 6 (Six) Hours As Needed.     • pravastatin (PRAVACHOL) 40 MG tablet TAKE 1 TABLET BY MOUTH AT BEDTIME (Patient taking differently: Take 20 mg by mouth Every Night.) 90 tablet 3   • aspirin 81 MG EC tablet Take 81 mg by mouth Daily.     • CBD (cannabidiol) oral oil Take  by mouth Daily. 60 mg Gummies     • indapamide (LOZOL) 2.5 MG tablet TAKE 1 TABLET BY MOUTH  "ONCE DAILY IN THE MORNING 90 tablet 1   • Methylcobalamin 1 MG chewable tablet once a day     • metoprolol succinate XL (TOPROL-XL) 50 MG 24 hr tablet Take 25 mg by mouth 2 (two) times a day.     • QUEtiapine (SEROquel) 25 MG tablet Take 1 tablet by mouth 4 (Four) Times a Day. 360 tablet 2     No facility-administered medications prior to visit.       Opioid medication/s are on active medication list.  and I have evaluated his active treatment plan and pain score trends (see table).  Vitals:    10/03/22 1027   PainSc: 0-No pain     I have reviewed the chart for potential of high risk medication and harmful drug interactions in the elderly.            Aspirin is on active medication list. Aspirin use is indicated based on review of current medical condition/s. Pros and cons of this therapy have been discussed today. Benefits of this medication outweigh potential harm.  Patient has been encouraged to continue taking this medication.  .      Patient Active Problem List   Diagnosis   • Sensorineural hearing loss (SNHL) of both ears   • Tinnitus of both ears   • Auditory hallucinations     Advance Care Planning  Advance Directive is not on file.  ACP discussion was declined by the patient. Patient does not have an advance directive, declines further assistance.    Review of Systems   Respiratory: Negative for shortness of breath.    Cardiovascular: Negative for chest pain and leg swelling.   Genitourinary: Positive for difficulty urinating.   Musculoskeletal: Positive for arthralgias and back pain.   All other systems reviewed and are negative.       Objective    Vitals:    10/03/22 1027   BP: 140/78   BP Location: Left arm   Patient Position: Sitting   Cuff Size: Adult   Pulse: 58   Resp: 18   Temp: 98.7 °F (37.1 °C)   TempSrc: Temporal   SpO2: 97%   Weight: 71.9 kg (158 lb 9.6 oz)   Height: 165.1 cm (65\")   PainSc: 0-No pain     Estimated body mass index is 26.39 kg/m² as calculated from the following:    Height as of " "this encounter: 165.1 cm (65\").    Weight as of this encounter: 71.9 kg (158 lb 9.6 oz).    BMI is >= 25 and <30. (Overweight) The following options were offered after discussion;: none (medical contraindication)      Does the patient have evidence of cognitive impairment? No    Physical Exam  Vitals and nursing note reviewed.   Constitutional:       Appearance: Normal appearance.   HENT:      Right Ear: Tympanic membrane and ear canal normal.      Left Ear: Tympanic membrane and ear canal normal.   Eyes:      Extraocular Movements: Extraocular movements intact.      Pupils: Pupils are equal, round, and reactive to light.   Neck:      Vascular: No carotid bruit.   Cardiovascular:      Rate and Rhythm: Normal rate and regular rhythm.      Pulses: Normal pulses.      Heart sounds: Normal heart sounds.   Pulmonary:      Effort: Pulmonary effort is normal.      Breath sounds: Normal breath sounds.   Abdominal:      General: Abdomen is flat. There is no distension.      Palpations: Abdomen is soft. There is no mass.   Genitourinary:     Penis: Normal.       Testes: Normal.      Prostate: Normal.      Comments: No testicular masses inguinal hernia or adenopathy prostate 2+ raphae maintained  Musculoskeletal:      Right lower leg: No edema.      Left lower leg: No edema.   Lymphadenopathy:      Cervical: No cervical adenopathy.   Skin:     General: Skin is warm and dry.   Neurological:      General: No focal deficit present.      Mental Status: He is alert and oriented to person, place, and time.   Psychiatric:         Mood and Affect: Mood normal.         Behavior: Behavior normal.         Thought Content: Thought content normal.         Judgment: Judgment normal.                 HEALTH RISK ASSESSMENT    Smoking Status:  Social History     Tobacco Use   Smoking Status Never Smoker   Smokeless Tobacco Current User   • Types: Snuff     Alcohol Consumption:  Social History     Substance and Sexual Activity   Alcohol Use No "     Fall Risk Screen:    [facility], and patient is at LOW risk for falls.Assessment completed on:10/3/2022    Depression Screening:  PHQ-2/PHQ-9 Depression Screening 10/3/2022   Retired PHQ-9 Total Score -   Retired Total Score -   Little Interest or Pleasure in Doing Things 0-->not at all   Feeling Down, Depressed or Hopeless 0-->not at all   PHQ-9: Brief Depression Severity Measure Score 0       Health Habits and Functional and Cognitive Screening:  Functional & Cognitive Status 10/3/2022   Do you have difficulty preparing food and eating? No   Do you have difficulty bathing yourself, getting dressed or grooming yourself? No   Do you have difficulty using the toilet? No   Do you have difficulty moving around from place to place? No   Do you have trouble with steps or getting out of a bed or a chair? No   Current Diet Well Balanced Diet   Dental Exam Up to date   Eye Exam Up to date   Exercise (times per week) 2 times per week   Current Exercises Include No Regular Exercise;Walking   Current Exercise Activities Include -   Do you need help using the phone?  No   Are you deaf or do you have serious difficulty hearing?  Yes   Do you need help with transportation? No   Do you need help shopping? No   Do you need help preparing meals?  No   Do you need help with housework?  No   Do you need help with laundry? No   Do you need help taking your medications? No   Do you need help managing money? No   Do you ever drive or ride in a car without wearing a seat belt? No   Have you felt unusual stress, anger or loneliness in the last month? No   Who do you live with? Spouse   If you need help, do you have trouble finding someone available to you? No   Have you been bothered in the last four weeks by sexual problems? No   Do you have difficulty concentrating, remembering or making decisions? No       Age-appropriate Screening Schedule:  Refer to the list below for future screening recommendations  based on patient's age, sex and/or medical conditions. Orders for these recommended tests are listed in the plan section. The patient has been provided with a written plan.    Health Maintenance   Topic Date Due   • LIPID PANEL  09/29/2022   • INFLUENZA VACCINE  Completed   • ZOSTER VACCINE  Completed   • TDAP/TD VACCINES  Discontinued              Assessment & Plan   CMS Preventative Services Quick Reference  Risk Factors Identified During Encounter  Fall Risk-High or Moderate  The above risks/problems have been discussed with the patient.  Follow up actions/plans if indicated are seen below in the Assessment/Plan Section.  Pertinent information has been shared with the patient in the After Visit Summary.    Diagnoses and all orders for this visit:    1. Fatigue, unspecified type (Primary)  -     TSH  -     T4, free  -     CBC & Differential    2. Mixed hyperlipidemia  -     Comprehensive Metabolic Panel  -     Lipid Panel    3. Special screening for malignant neoplasm of prostate  -     PSA Screen    4. Hyperglycemia  -     Hemoglobin A1c  -     POC Urinalysis Dipstick, Multipro    5. Medicare annual wellness visit, subsequent    6. Immunization due  -     Cancel: Fluzone High-Dose 65+yrs (8041-4966)    7. Screening for colorectal cancer  -     Cologuard - Stool, Per Rectum; Future        Follow Up:   Return in about 6 months (around 4/3/2023).     An After Visit Summary and PPPS were made available to the patient.          I spent 30 minutes caring for Bryant on this date of service. This time includes time spent by me in the following activities:preparing for the visit, reviewing tests, obtaining and/or reviewing a separately obtained history, performing a medically appropriate examination and/or evaluation , counseling and educating the patient/family/caregiver, ordering medications, tests, or procedures and documenting information in the medical record       Plan above-refuses COVID shot

## 2022-10-04 LAB
ALBUMIN SERPL-MCNC: 4.7 G/DL (ref 3.5–5.2)
ALBUMIN/GLOB SERPL: 3.1 G/DL
ALP SERPL-CCNC: 47 U/L (ref 39–117)
ALT SERPL-CCNC: 11 U/L (ref 1–41)
AST SERPL-CCNC: 19 U/L (ref 1–40)
BASOPHILS # BLD AUTO: 0.04 10*3/MM3 (ref 0–0.2)
BASOPHILS NFR BLD AUTO: 0.8 % (ref 0–1.5)
BILIRUB SERPL-MCNC: 0.4 MG/DL (ref 0–1.2)
BUN SERPL-MCNC: 6 MG/DL (ref 8–23)
BUN/CREAT SERPL: 6 (ref 7–25)
CALCIUM SERPL-MCNC: 10.4 MG/DL (ref 8.6–10.5)
CHLORIDE SERPL-SCNC: 104 MMOL/L (ref 98–107)
CHOLEST SERPL-MCNC: 177 MG/DL (ref 0–200)
CO2 SERPL-SCNC: 29 MMOL/L (ref 22–29)
CREAT SERPL-MCNC: 1 MG/DL (ref 0.76–1.27)
EGFRCR SERPLBLD CKD-EPI 2021: 85.1 ML/MIN/1.73
EOSINOPHIL # BLD AUTO: 0.11 10*3/MM3 (ref 0–0.4)
EOSINOPHIL NFR BLD AUTO: 2.2 % (ref 0.3–6.2)
ERYTHROCYTE [DISTWIDTH] IN BLOOD BY AUTOMATED COUNT: 12.3 % (ref 12.3–15.4)
GLOBULIN SER CALC-MCNC: 1.5 GM/DL
GLUCOSE SERPL-MCNC: 92 MG/DL (ref 65–99)
HBA1C MFR BLD: 5.5 % (ref 4.8–5.6)
HCT VFR BLD AUTO: 40.5 % (ref 37.5–51)
HDLC SERPL-MCNC: 57 MG/DL (ref 40–60)
HGB BLD-MCNC: 13.5 G/DL (ref 13–17.7)
IMM GRANULOCYTES # BLD AUTO: 0.01 10*3/MM3 (ref 0–0.05)
IMM GRANULOCYTES NFR BLD AUTO: 0.2 % (ref 0–0.5)
LDLC SERPL CALC-MCNC: 105 MG/DL (ref 0–100)
LYMPHOCYTES # BLD AUTO: 1.41 10*3/MM3 (ref 0.7–3.1)
LYMPHOCYTES NFR BLD AUTO: 27.6 % (ref 19.6–45.3)
MCH RBC QN AUTO: 31.5 PG (ref 26.6–33)
MCHC RBC AUTO-ENTMCNC: 33.3 G/DL (ref 31.5–35.7)
MCV RBC AUTO: 94.6 FL (ref 79–97)
MONOCYTES # BLD AUTO: 0.62 10*3/MM3 (ref 0.1–0.9)
MONOCYTES NFR BLD AUTO: 12.1 % (ref 5–12)
NEUTROPHILS # BLD AUTO: 2.92 10*3/MM3 (ref 1.7–7)
NEUTROPHILS NFR BLD AUTO: 57.1 % (ref 42.7–76)
NRBC BLD AUTO-RTO: 0 /100 WBC (ref 0–0.2)
PLATELET # BLD AUTO: 216 10*3/MM3 (ref 140–450)
POTASSIUM SERPL-SCNC: 4.4 MMOL/L (ref 3.5–5.2)
PROT SERPL-MCNC: 6.2 G/DL (ref 6–8.5)
PSA SERPL-MCNC: 1.09 NG/ML (ref 0–4)
RBC # BLD AUTO: 4.28 10*6/MM3 (ref 4.14–5.8)
SODIUM SERPL-SCNC: 142 MMOL/L (ref 136–145)
T4 FREE SERPL-MCNC: 1.01 NG/DL (ref 0.93–1.7)
TRIGL SERPL-MCNC: 80 MG/DL (ref 0–150)
TSH SERPL DL<=0.005 MIU/L-ACNC: 1.35 UIU/ML (ref 0.27–4.2)
VLDLC SERPL CALC-MCNC: 15 MG/DL (ref 5–40)
WBC # BLD AUTO: 5.11 10*3/MM3 (ref 3.4–10.8)

## 2022-10-12 DIAGNOSIS — R53.83 FATIGUE, UNSPECIFIED TYPE: ICD-10-CM

## 2022-10-12 DIAGNOSIS — E78.2 MIXED HYPERLIPIDEMIA: Primary | ICD-10-CM

## 2022-10-12 RX ORDER — ATORVASTATIN CALCIUM 40 MG/1
40 TABLET, FILM COATED ORAL DAILY
Qty: 90 TABLET | Refills: 3 | Status: SHIPPED | OUTPATIENT
Start: 2022-10-12 | End: 2022-10-17

## 2022-10-14 ENCOUNTER — TELEPHONE (OUTPATIENT)
Dept: FAMILY MEDICINE CLINIC | Facility: CLINIC | Age: 62
End: 2022-10-14

## 2022-10-14 NOTE — TELEPHONE ENCOUNTER
Caller: Bryant Delgado    Relationship: Self    Best call back number: [phone]    Caller requesting test results:SELF     What test was performed: LABS FROM WELLNESS VISIT       When was the test performed:10/3/22    Where was the test performed:  IN OFFICE

## 2022-10-14 NOTE — TELEPHONE ENCOUNTER
Tried calling patient back and the man that answered claimed his name was Don.  I asked for the patient and was told I had the wrong number.

## 2022-10-17 RX ORDER — ATORVASTATIN CALCIUM 40 MG/1
40 TABLET, FILM COATED ORAL NIGHTLY
Qty: 90 TABLET | Refills: 3 | Status: SHIPPED | OUTPATIENT
Start: 2022-10-17 | End: 2023-02-20 | Stop reason: DRUGHIGH

## 2022-11-28 DIAGNOSIS — R19.5 POSITIVE COLORECTAL CANCER SCREENING USING COLOGUARD TEST: Primary | ICD-10-CM

## 2022-11-29 ENCOUNTER — OFFICE VISIT (OUTPATIENT)
Dept: FAMILY MEDICINE CLINIC | Facility: CLINIC | Age: 62
End: 2022-11-29

## 2022-11-29 VITALS
RESPIRATION RATE: 16 BRPM | TEMPERATURE: 97.8 F | DIASTOLIC BLOOD PRESSURE: 72 MMHG | BODY MASS INDEX: 25.76 KG/M2 | WEIGHT: 154.6 LBS | SYSTOLIC BLOOD PRESSURE: 130 MMHG | OXYGEN SATURATION: 99 % | HEART RATE: 56 BPM | HEIGHT: 65 IN

## 2022-11-29 DIAGNOSIS — R06.02 SOB (SHORTNESS OF BREATH): Primary | ICD-10-CM

## 2022-11-29 DIAGNOSIS — E78.2 MIXED HYPERLIPIDEMIA: ICD-10-CM

## 2022-11-29 DIAGNOSIS — M54.6 ACUTE RIGHT-SIDED THORACIC BACK PAIN: ICD-10-CM

## 2022-11-29 DIAGNOSIS — M54.9 BACK PAIN, UNSPECIFIED BACK LOCATION, UNSPECIFIED BACK PAIN LATERALITY, UNSPECIFIED CHRONICITY: ICD-10-CM

## 2022-11-29 PROCEDURE — 99214 OFFICE O/P EST MOD 30 MIN: CPT | Performed by: FAMILY MEDICINE

## 2022-11-29 PROCEDURE — 87428 SARSCOV & INF VIR A&B AG IA: CPT | Performed by: FAMILY MEDICINE

## 2022-11-29 PROCEDURE — 96372 THER/PROPH/DIAG INJ SC/IM: CPT | Performed by: FAMILY MEDICINE

## 2022-11-29 PROCEDURE — 81003 URINALYSIS AUTO W/O SCOPE: CPT | Performed by: FAMILY MEDICINE

## 2022-11-29 RX ORDER — PREDNISONE 10 MG/1
TABLET ORAL
Qty: 21 TABLET | Refills: 0 | Status: SHIPPED | OUTPATIENT
Start: 2022-11-29 | End: 2023-02-20

## 2022-11-29 RX ORDER — TRIAMCINOLONE ACETONIDE 40 MG/ML
40 INJECTION, SUSPENSION INTRA-ARTICULAR; INTRAMUSCULAR ONCE
Status: COMPLETED | OUTPATIENT
Start: 2022-11-29 | End: 2022-11-29

## 2022-11-29 RX ADMIN — TRIAMCINOLONE ACETONIDE 40 MG: 40 INJECTION, SUSPENSION INTRA-ARTICULAR; INTRAMUSCULAR at 11:53

## 2022-11-29 NOTE — PROGRESS NOTES
Subjective   Bryant Delgado is a 62 y.o. male.     History of Present Illness  62-year-old male with thoracic back pain on the right over the last 10 days-chest x-ray normal an hour ago      The following portions of the patient's history were reviewed and updated as appropriate: allergies, current medications, past family history, past medical history, past social history, past surgical history and problem list.    Review of Systems   Respiratory: Negative for shortness of breath.         The patient is not short of breath-he does have increased pain when he breathes deeply   Cardiovascular: Positive for chest pain. Negative for leg swelling.   Musculoskeletal: Positive for arthralgias and back pain.       Objective   Physical Exam  Vitals and nursing note reviewed.   Constitutional:       Appearance: Normal appearance.   Cardiovascular:      Rate and Rhythm: Normal rate and regular rhythm.   Pulmonary:      Effort: Pulmonary effort is normal.      Breath sounds: Normal breath sounds.   Abdominal:      General: Abdomen is flat.      Palpations: Abdomen is soft.      Tenderness: There is no right CVA tenderness or left CVA tenderness.   Musculoskeletal:      Right lower leg: No edema.      Left lower leg: No edema.   Skin:     General: Skin is warm and dry.   Neurological:      General: No focal deficit present.      Mental Status: He is alert and oriented to person, place, and time.   Psychiatric:         Mood and Affect: Mood normal.         Behavior: Behavior normal.         Assessment & Plan   Diagnoses and all orders for this visit:    1. SOB (shortness of breath) (Primary)  -     XR Chest PA & Lateral; Future  -     XR Chest PA & Lateral  -     CBC & Differential    2. Acute right-sided thoracic back pain    3. Back pain, unspecified back location, unspecified back pain laterality, unspecified chronicity  -     POC Urinalysis Dipstick, Multipro  -     predniSONE (DELTASONE) 10 MG (21) dose pack; Use as  directed on package--start Wednesday morning  Dispense: 21 tablet; Refill: 0  -     triamcinolone acetonide (KENALOG-40) injection 40 mg    4. Mixed hyperlipidemia  -     Comprehensive Metabolic Panel  -     Lipid Panel       COVID and flu test negative

## 2022-11-30 LAB
ALBUMIN SERPL-MCNC: 4.3 G/DL (ref 3.5–5.2)
ALBUMIN/GLOB SERPL: 2.5 G/DL
ALP SERPL-CCNC: 46 U/L (ref 39–117)
ALT SERPL-CCNC: 7 U/L (ref 1–41)
AST SERPL-CCNC: 14 U/L (ref 1–40)
BASOPHILS # BLD AUTO: 0.03 10*3/MM3 (ref 0–0.2)
BASOPHILS NFR BLD AUTO: 0.6 % (ref 0–1.5)
BILIRUB SERPL-MCNC: 0.5 MG/DL (ref 0–1.2)
BUN SERPL-MCNC: 9 MG/DL (ref 8–23)
BUN/CREAT SERPL: 8.5 (ref 7–25)
CALCIUM SERPL-MCNC: 10 MG/DL (ref 8.6–10.5)
CHLORIDE SERPL-SCNC: 104 MMOL/L (ref 98–107)
CHOLEST SERPL-MCNC: 154 MG/DL (ref 0–200)
CO2 SERPL-SCNC: 29 MMOL/L (ref 22–29)
CREAT SERPL-MCNC: 1.06 MG/DL (ref 0.76–1.27)
EGFRCR SERPLBLD CKD-EPI 2021: 79.4 ML/MIN/1.73
EOSINOPHIL # BLD AUTO: 0.04 10*3/MM3 (ref 0–0.4)
EOSINOPHIL NFR BLD AUTO: 0.8 % (ref 0.3–6.2)
ERYTHROCYTE [DISTWIDTH] IN BLOOD BY AUTOMATED COUNT: 11.9 % (ref 12.3–15.4)
EXPIRATION DATE: NORMAL
FLUAV AG UPPER RESP QL IA.RAPID: NOT DETECTED
FLUBV AG UPPER RESP QL IA.RAPID: NOT DETECTED
GLOBULIN SER CALC-MCNC: 1.7 GM/DL
GLUCOSE SERPL-MCNC: 96 MG/DL (ref 65–99)
HCT VFR BLD AUTO: 38.3 % (ref 37.5–51)
HDLC SERPL-MCNC: 52 MG/DL (ref 40–60)
HGB BLD-MCNC: 13 G/DL (ref 13–17.7)
IMM GRANULOCYTES # BLD AUTO: 0.01 10*3/MM3 (ref 0–0.05)
IMM GRANULOCYTES NFR BLD AUTO: 0.2 % (ref 0–0.5)
INTERNAL CONTROL: NORMAL
LDLC SERPL CALC-MCNC: 89 MG/DL (ref 0–100)
LYMPHOCYTES # BLD AUTO: 1.29 10*3/MM3 (ref 0.7–3.1)
LYMPHOCYTES NFR BLD AUTO: 25.4 % (ref 19.6–45.3)
Lab: NORMAL
MCH RBC QN AUTO: 31 PG (ref 26.6–33)
MCHC RBC AUTO-ENTMCNC: 33.9 G/DL (ref 31.5–35.7)
MCV RBC AUTO: 91.4 FL (ref 79–97)
MONOCYTES # BLD AUTO: 0.44 10*3/MM3 (ref 0.1–0.9)
MONOCYTES NFR BLD AUTO: 8.7 % (ref 5–12)
NEUTROPHILS # BLD AUTO: 3.26 10*3/MM3 (ref 1.7–7)
NEUTROPHILS NFR BLD AUTO: 64.3 % (ref 42.7–76)
NRBC BLD AUTO-RTO: 0 /100 WBC (ref 0–0.2)
PLATELET # BLD AUTO: 200 10*3/MM3 (ref 140–450)
POTASSIUM SERPL-SCNC: 5.1 MMOL/L (ref 3.5–5.2)
PROT SERPL-MCNC: 6 G/DL (ref 6–8.5)
RBC # BLD AUTO: 4.19 10*6/MM3 (ref 4.14–5.8)
SARS-COV-2 AG UPPER RESP QL IA.RAPID: NOT DETECTED
SODIUM SERPL-SCNC: 141 MMOL/L (ref 136–145)
TRIGL SERPL-MCNC: 68 MG/DL (ref 0–150)
VLDLC SERPL CALC-MCNC: 13 MG/DL (ref 5–40)
WBC # BLD AUTO: 5.07 10*3/MM3 (ref 3.4–10.8)

## 2022-12-05 RX ORDER — METOPROLOL SUCCINATE 50 MG/1
50 TABLET, EXTENDED RELEASE ORAL DAILY
Qty: 90 TABLET | Refills: 3 | Status: SHIPPED | OUTPATIENT
Start: 2022-12-05 | End: 2023-02-08 | Stop reason: SDUPTHER

## 2022-12-05 RX ORDER — LISINOPRIL 20 MG/1
20 TABLET ORAL 2 TIMES DAILY
Qty: 180 TABLET | Refills: 3 | Status: SHIPPED | OUTPATIENT
Start: 2022-12-05 | End: 2023-02-28 | Stop reason: SDUPTHER

## 2022-12-05 NOTE — TELEPHONE ENCOUNTER
Rx Refill Note  Requested Prescriptions     Pending Prescriptions Disp Refills   • lisinopril (PRINIVIL,ZESTRIL) 20 MG tablet 180 tablet 3     Sig: Take 1 tablet by mouth 2 (Two) Times a Day.   • metoprolol succinate XL (TOPROL-XL) 50 MG 24 hr tablet 90 tablet 3     Sig: Take 1 tablet by mouth Daily.      Last office visit with prescribing clinician: 11/29/2022   Last telemedicine visit with prescribing clinician: 4/3/2023   Next office visit with prescribing clinician: 4/3/2023                         Would you like a call back once the refill request has been completed: [] Yes [] No    If the office needs to give you a call back, can they leave a voicemail: [] Yes [] No    Gómez Sarah Rep  12/05/22, 10:59 CST

## 2022-12-28 ENCOUNTER — TELEPHONE (OUTPATIENT)
Dept: FAMILY MEDICINE CLINIC | Facility: CLINIC | Age: 62
End: 2022-12-28
Payer: MEDICARE

## 2022-12-28 NOTE — TELEPHONE ENCOUNTER
Sarah Danny called today and has concerns as he is still passing blood. He said that Dr. Avina wanted to do some different tests before doing a colonoscopy and was waiting on approval from his insurance but his insurance is denying that they will pay for any of the tests. He would appreciate a call back.

## 2023-02-01 ENCOUNTER — NURSE TRIAGE (OUTPATIENT)
Dept: CALL CENTER | Facility: HOSPITAL | Age: 63
End: 2023-02-01
Payer: MEDICARE

## 2023-02-01 NOTE — TELEPHONE ENCOUNTER
Call placed to Dr Villanueva. Informed has been taking medication incorrectly.  New script needed for insurance to cover cost of medication.  Dr Villanueva will call pharmacy  Reason for Disposition  • [1] Prescription refill request for ESSENTIAL medicine (i.e., likelihood of harm to patient if not taken) AND [2] triager unable to refill per department policy    Additional Information  • Negative: [1] Intentional drug overdose AND [2] suicidal thoughts or ideas  • Negative: Drug overdose and triager unable to answer question  • Negative: Caller requesting information unrelated to medicine  • Negative: Caller requesting information about COVID-19 Vaccine  • Negative: Caller requesting information about Emergency Contraception  • Negative: Caller requesting information about Combined Birth Control Pills  • Negative: Caller requesting information about Progestin Birth Control Pills  • Negative: Caller requesting information about Post-Op pain or medicines  • Negative: Caller requesting a prescription antibiotic (such as Penicillin) for Strep throat and has a positive culture result  • Negative: Caller requesting a prescription anti-viral med (such as Tamiflu) and has influenza (flu)  symptoms  • Negative: Immunization reaction suspected  • Negative: Rash while taking a medicine or within 3 days of stopping it  • Negative: [1] Asthma and [2] having symptoms of asthma (cough, wheezing, etc.)  • Negative: [1] Symptom of illness (e.g., headache, abdominal pain, earache, vomiting) AND [2] more than mild  • Negative: Breastfeeding questions about mother's medicines and diet  • Negative: MORE THAN A DOUBLE DOSE of a prescription or over-the-counter (OTC) drug  • Negative: [1] DOUBLE DOSE (an extra dose or lesser amount) of prescription drug AND [2] any symptoms (e.g., dizziness, nausea, pain, sleepiness)  • Negative: [1] DOUBLE DOSE (an extra dose or lesser amount) of over-the-counter (OTC) drug AND [2] any symptoms (e.g.,  "dizziness, nausea, pain, sleepiness)  • Negative: Took another person's prescription drug  • Negative: [1] DOUBLE DOSE (an extra dose or lesser amount) of prescription drug AND [2] NO symptoms (Exception: a double dose of antibiotics)  • Negative: Diabetes drug error or overdose (e.g., took wrong type of insulin or took extra dose)    Answer Assessment - Initial Assessment Questions  1. NAME of MEDICATION: \"What medicine are you calling about?\"  Metroprolol  2. QUESTION: \"What is your question?\" (e.g., medication refill, side effect)     Refill out of medication No refills He has been taking medication incorrectly.  3. PRESCRIBING HCP: \"Who prescribed it?\" Reason: if prescribed by specialist, call should be referred to that group.  [facility]  4. SYMPTOMS: \"Do you have any symptoms?\"      *No Answer*  5. SEVERITY: If symptoms are present, ask \"Are they mild, moderate or severe?\"      *No Answer*  6. PREGNANCY:  \"Is there any chance that you are pregnant?\" \"When was your last menstrual period?\"      *No Answer*    Protocols used: MEDICATION QUESTION CALL-ADULT-AH    "

## 2023-02-08 ENCOUNTER — TELEPHONE (OUTPATIENT)
Dept: FAMILY MEDICINE CLINIC | Facility: CLINIC | Age: 63
End: 2023-02-08
Payer: MEDICARE

## 2023-02-08 RX ORDER — METOPROLOL SUCCINATE 50 MG/1
50 TABLET, EXTENDED RELEASE ORAL DAILY
Qty: 90 TABLET | Refills: 3 | Status: SHIPPED | OUTPATIENT
Start: 2023-02-08

## 2023-02-08 NOTE — TELEPHONE ENCOUNTER
Caller: Shanta Delgado    Relationship: Emergency Contact    Best call back number: [phone]    Requested Prescriptions:   Requested Prescriptions     Pending Prescriptions Disp Refills   • metoprolol succinate XL (TOPROL-XL) 50 MG 24 hr tablet 90 tablet 3     Sig: Take 1 tablet by mouth Daily.        Pharmacy where request should be sent: [address] - [phone] [facility] [phone] [facility]     Additional details provided by patient: PATIENT COMPLETELY OUT. TOOK ALL MEDICATION DUE TO NOT KNOWING THAT DOSAGE WAS UPPSED TO 50 MG'S, WAS TAKING 2 A DAY VERSUS THE 1 A DAY. NEEDING A NEW PRESCRIPTION FOR INSURANCE TO COVER SINCE IT WOULD BE REFILLED TOO EARLY.    Does the patient have less than a 3 day supply:  [x] Yes  [] No    Would you like a call back once the refill request has been completed: [x] Yes [] No    If the office needs to give you a call back, can they leave a voicemail: [x] Yes [] No    Gómez Sahu Rep   02/08/23 11:59 CST

## 2023-02-20 ENCOUNTER — OFFICE VISIT (OUTPATIENT)
Dept: FAMILY MEDICINE CLINIC | Facility: CLINIC | Age: 63
End: 2023-02-20
Payer: MEDICARE

## 2023-02-20 VITALS
HEIGHT: 65 IN | BODY MASS INDEX: 26.66 KG/M2 | OXYGEN SATURATION: 98 % | WEIGHT: 160 LBS | DIASTOLIC BLOOD PRESSURE: 70 MMHG | RESPIRATION RATE: 16 BRPM | TEMPERATURE: 98.7 F | HEART RATE: 78 BPM | SYSTOLIC BLOOD PRESSURE: 130 MMHG

## 2023-02-20 DIAGNOSIS — H53.8 VISION BLURRED: ICD-10-CM

## 2023-02-20 DIAGNOSIS — G89.29 CHRONIC NONINTRACTABLE HEADACHE, UNSPECIFIED HEADACHE TYPE: ICD-10-CM

## 2023-02-20 DIAGNOSIS — R51.9 CHRONIC NONINTRACTABLE HEADACHE, UNSPECIFIED HEADACHE TYPE: ICD-10-CM

## 2023-02-20 DIAGNOSIS — R42 DIZZINESS: ICD-10-CM

## 2023-02-20 DIAGNOSIS — R10.9 FLANK PAIN: ICD-10-CM

## 2023-02-20 DIAGNOSIS — R10.11 RIGHT UPPER QUADRANT ABDOMINAL PAIN: Primary | ICD-10-CM

## 2023-02-20 LAB
BILIRUB BLD-MCNC: NEGATIVE MG/DL
CLARITY, POC: CLEAR
COLOR UR: NORMAL
GLUCOSE UR STRIP-MCNC: NEGATIVE MG/DL
KETONES UR QL: NEGATIVE
LEUKOCYTE EST, POC: NEGATIVE
NITRITE UR-MCNC: NEGATIVE MG/ML
PH UR: 5.5 [PH] (ref 5–8)
PROT UR STRIP-MCNC: NEGATIVE MG/DL
RBC # UR STRIP: NEGATIVE /UL
SP GR UR: 1.02 (ref 1–1.03)
UROBILINOGEN UR QL: NORMAL

## 2023-02-20 PROCEDURE — 81003 URINALYSIS AUTO W/O SCOPE: CPT | Performed by: FAMILY MEDICINE

## 2023-02-20 PROCEDURE — 99214 OFFICE O/P EST MOD 30 MIN: CPT | Performed by: FAMILY MEDICINE

## 2023-02-20 RX ORDER — ATORVASTATIN CALCIUM 40 MG/1
40 TABLET, FILM COATED ORAL NIGHTLY
Qty: 90 TABLET | Refills: 3 | Status: CANCELLED | OUTPATIENT
Start: 2023-02-20

## 2023-02-20 RX ORDER — PRAVASTATIN SODIUM 40 MG
20 TABLET ORAL NIGHTLY
COMMUNITY
End: 2023-02-28 | Stop reason: SDUPTHER

## 2023-02-20 NOTE — PROGRESS NOTES
Subjective   Bryant Delgado is a 62 y.o. male.     History of Present Illness  62-year-old male with right-sided abdominal pain and dysuria, also dizziness lightheaded with previously abnormal PET scan as far as flow goes in the right frontoparietal lobe      The following portions of the patient's history were reviewed and updated as appropriate: allergies, current medications, past family history, past medical history, past social history, past surgical history and problem list.    Review of Systems   Respiratory: Negative for shortness of breath.    Cardiovascular: Negative for chest pain and leg swelling.   Gastrointestinal: Positive for abdominal pain.   Musculoskeletal: Positive for back pain.        Right CVA tenderness consistent with nephrogenic pain and/or retroperitoneal pain   Neurological: Positive for dizziness, light-headedness and headaches.        Had remote PET scan that showed uneven flow in the right frontal temporal area of the brain       Objective   Physical Exam  Vitals and nursing note reviewed.   Constitutional:       Appearance: Normal appearance.   Eyes:      Extraocular Movements: Extraocular movements intact.      Pupils: Pupils are equal, round, and reactive to light.   Cardiovascular:      Rate and Rhythm: Normal rate and regular rhythm.   Pulmonary:      Effort: Pulmonary effort is normal.      Breath sounds: Normal breath sounds.   Abdominal:      General: Abdomen is flat.      Palpations: Abdomen is soft. There is no mass.   Musculoskeletal:      Right lower leg: No edema.      Left lower leg: No edema.   Skin:     General: Skin is warm and dry.   Neurological:      General: No focal deficit present.      Mental Status: He is alert and oriented to person, place, and time.   Psychiatric:         Mood and Affect: Mood normal.         Behavior: Behavior normal.         Thought Content: Thought content normal.         Judgment: Judgment normal.         Assessment & Plan   Diagnoses  and all orders for this visit:    1. Right upper quadrant abdominal pain (Primary)  -     CT Abdomen Pelvis Without Contrast; Future    2. Dizziness  -     MRI Brain Without Contrast; Future    3. Flank pain  -     POC Urinalysis Dipstick, Multipro  -     CT Abdomen Pelvis Without Contrast; Future    4. Chronic nonintractable headache, unspecified headache type  -     MRI Brain Without Contrast; Future    5. Vision blurred  -     MRI Brain Without Contrast; Future       Above-urinalysis normal- advised check eye exam

## 2023-02-28 RX ORDER — LISINOPRIL 20 MG/1
20 TABLET ORAL 2 TIMES DAILY
Qty: 180 TABLET | Refills: 3 | Status: SHIPPED | OUTPATIENT
Start: 2023-02-28

## 2023-02-28 RX ORDER — PRAVASTATIN SODIUM 40 MG
20 TABLET ORAL NIGHTLY
Qty: 90 TABLET | Refills: 1 | Status: SHIPPED | OUTPATIENT
Start: 2023-02-28

## 2023-02-28 NOTE — TELEPHONE ENCOUNTER
Rx Refill Note  Requested Prescriptions     Pending Prescriptions Disp Refills   • lisinopril (PRINIVIL,ZESTRIL) 20 MG tablet 180 tablet 3     Sig: Take 1 tablet by mouth 2 (Two) Times a Day.   • pravastatin (PRAVACHOL) 40 MG tablet 90 tablet 1     Sig: Take 0.5 tablets by mouth Every Night.      Last office visit with prescribing clinician: 2/20/2023   Last telemedicine visit with prescribing clinician: 4/3/2023   Next office visit with prescribing clinician: 4/3/2023   CPE done 10/03/2022    {TIP  Please add Last Relevant Lab Date if appropriate: 11/29/2022             {TIP  Is Refill Pharmacy correct?: yes    Cristin Garcia MA  02/28/23, 16:01 CST

## 2023-03-13 ENCOUNTER — TELEPHONE (OUTPATIENT)
Dept: FAMILY MEDICINE CLINIC | Facility: CLINIC | Age: 63
End: 2023-03-13
Payer: MEDICARE

## 2023-03-13 NOTE — TELEPHONE ENCOUNTER
Bryant called and said he has been talking to the  because he believes he is being exposed to radiation in his house and they recommend he check with his Dr to see if there is a radiation exposure test you can suggest for him to have done somewhere?

## 2023-03-17 NOTE — TELEPHONE ENCOUNTER
Patient called back stating the health Dept does not offer that kind of test.  I informed him we don't know where else he could go to be checked and he should try searching the Internet to see if he can find any information on who he can reach out to.

## 2023-04-03 ENCOUNTER — OFFICE VISIT (OUTPATIENT)
Dept: FAMILY MEDICINE CLINIC | Facility: CLINIC | Age: 63
End: 2023-04-03
Payer: MEDICARE

## 2023-04-03 VITALS
SYSTOLIC BLOOD PRESSURE: 112 MMHG | HEIGHT: 65 IN | HEART RATE: 78 BPM | TEMPERATURE: 97 F | RESPIRATION RATE: 16 BRPM | BODY MASS INDEX: 26.36 KG/M2 | OXYGEN SATURATION: 98 % | DIASTOLIC BLOOD PRESSURE: 60 MMHG | WEIGHT: 158.2 LBS

## 2023-04-03 DIAGNOSIS — E78.2 MIXED HYPERLIPIDEMIA: Primary | ICD-10-CM

## 2023-04-03 PROCEDURE — 1160F RVW MEDS BY RX/DR IN RCRD: CPT | Performed by: FAMILY MEDICINE

## 2023-04-03 PROCEDURE — 1159F MED LIST DOCD IN RCRD: CPT | Performed by: FAMILY MEDICINE

## 2023-04-03 PROCEDURE — 99213 OFFICE O/P EST LOW 20 MIN: CPT | Performed by: FAMILY MEDICINE

## 2023-04-03 NOTE — PROGRESS NOTES
Subjective   Bryant Delgado is a 63 y.o. male.     History of Present Illness  63-year-old male with hyperlipidemia and complaining of left-sided chest discomfort      The following portions of the patient's history were reviewed and updated as appropriate: allergies, current medications, past family history, past medical history, past social history, past surgical history and problem list.    Review of Systems   Respiratory: Negative for shortness of breath.    Cardiovascular: Negative for chest pain and leg swelling.        Pain consistent with musculoskeletal pain-cardiology work-up 2021 negative   Musculoskeletal: Positive for arthralgias.       Objective   Physical Exam  Vitals and nursing note reviewed.   Constitutional:       Appearance: Normal appearance.   Cardiovascular:      Rate and Rhythm: Normal rate and regular rhythm.   Pulmonary:      Effort: Pulmonary effort is normal.      Breath sounds: Normal breath sounds.   Abdominal:      General: Abdomen is flat.      Palpations: Abdomen is soft.   Musculoskeletal:      Right lower leg: No edema.      Left lower leg: No edema.   Neurological:      General: No focal deficit present.      Mental Status: He is alert and oriented to person, place, and time.   Psychiatric:         Mood and Affect: Mood normal.         Behavior: Behavior normal.         Thought Content: Thought content normal.         Judgment: Judgment normal.         Assessment & Plan   Diagnoses and all orders for this visit:    1. Mixed hyperlipidemia (Primary)  -     Comprehensive Metabolic Panel  -     Lipid Panel  -     CT Cardiac Calcium Score Without Dye; Future         Plan above plus CT for calcium score of coronary arteries

## 2023-04-04 LAB
ALBUMIN SERPL-MCNC: 4.4 G/DL (ref 3.5–5.2)
ALBUMIN/GLOB SERPL: 1.9 G/DL
ALP SERPL-CCNC: 46 U/L (ref 39–117)
ALT SERPL-CCNC: 11 U/L (ref 1–41)
AST SERPL-CCNC: 14 U/L (ref 1–40)
BILIRUB SERPL-MCNC: 0.5 MG/DL (ref 0–1.2)
BUN SERPL-MCNC: 18 MG/DL (ref 8–23)
BUN/CREAT SERPL: 17.3 (ref 7–25)
CALCIUM SERPL-MCNC: 10.7 MG/DL (ref 8.6–10.5)
CHLORIDE SERPL-SCNC: 101 MMOL/L (ref 98–107)
CHOLEST SERPL-MCNC: 183 MG/DL (ref 0–200)
CO2 SERPL-SCNC: 29 MMOL/L (ref 22–29)
CREAT SERPL-MCNC: 1.04 MG/DL (ref 0.76–1.27)
EGFRCR SERPLBLD CKD-EPI 2021: 80.7 ML/MIN/1.73
GLOBULIN SER CALC-MCNC: 2.3 GM/DL
GLUCOSE SERPL-MCNC: 91 MG/DL (ref 65–99)
HDLC SERPL-MCNC: 59 MG/DL (ref 40–60)
LDLC SERPL CALC-MCNC: 105 MG/DL (ref 0–100)
POTASSIUM SERPL-SCNC: 4.8 MMOL/L (ref 3.5–5.2)
PROT SERPL-MCNC: 6.7 G/DL (ref 6–8.5)
SODIUM SERPL-SCNC: 138 MMOL/L (ref 136–145)
TRIGL SERPL-MCNC: 108 MG/DL (ref 0–150)
VLDLC SERPL CALC-MCNC: 19 MG/DL (ref 5–40)

## 2023-08-07 ENCOUNTER — OFFICE VISIT (OUTPATIENT)
Dept: FAMILY MEDICINE CLINIC | Facility: CLINIC | Age: 63
End: 2023-08-07
Payer: MEDICARE

## 2023-08-07 VITALS
TEMPERATURE: 97.5 F | BODY MASS INDEX: 26.33 KG/M2 | HEIGHT: 65 IN | DIASTOLIC BLOOD PRESSURE: 78 MMHG | SYSTOLIC BLOOD PRESSURE: 120 MMHG | RESPIRATION RATE: 18 BRPM | WEIGHT: 158 LBS

## 2023-08-07 DIAGNOSIS — R73.9 HYPERGLYCEMIA: ICD-10-CM

## 2023-08-07 DIAGNOSIS — L20.84 INTRINSIC (ALLERGIC) ECZEMA: ICD-10-CM

## 2023-08-07 DIAGNOSIS — M60.9 MYOSITIS, UNSPECIFIED MYOSITIS TYPE, UNSPECIFIED SITE: ICD-10-CM

## 2023-08-07 DIAGNOSIS — E78.2 MIXED HYPERLIPIDEMIA: ICD-10-CM
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
## 2023-08-07 DIAGNOSIS — R53.83 FATIGUE, UNSPECIFIED TYPE: ICD-10-CM

## 2023-08-07 PROCEDURE — 1159F MED LIST DOCD IN RCRD: CPT | Performed by: FAMILY MEDICINE

## 2023-08-07 PROCEDURE — 99213 OFFICE O/P EST LOW 20 MIN: CPT | Performed by: FAMILY MEDICINE

## 2023-08-07 PROCEDURE — 1160F RVW MEDS BY RX/DR IN RCRD: CPT | Performed by: FAMILY MEDICINE

## 2023-08-07 RX ORDER — HYDROCODONE BITARTRATE AND ACETAMINOPHEN 10; 325 MG/1; MG/1
1 TABLET ORAL 3 TIMES DAILY
COMMUNITY
Start: 2023-07-11

## 2023-08-07 RX ORDER — MELOXICAM 15 MG/1
15 TABLET ORAL DAILY PRN
COMMUNITY
Start: 2023-07-29

## 2023-08-07 NOTE — PROGRESS NOTES
Subjective   Bryant Delgado is a 63 y.o. male.     History of Present Illness  63-year-old male with eczema and allergies multiple tick exposures and fatigue    The following portions of the patient's history were reviewed and updated as appropriate: allergies, current medications, past family history, past medical history, past social history, past surgical history, and problem list.    Review of Systems   Cardiovascular:  Negative for chest pain and leg swelling.   Gastrointestinal:  Positive for abdominal pain.   Musculoskeletal:  Positive for arthralgias and myalgias.   Skin:  Positive for rash.     Objective   Physical Exam  Vitals and nursing note reviewed.   Constitutional:       Appearance: Normal appearance.   Eyes:      Extraocular Movements: Extraocular movements intact.      Pupils: Pupils are equal, round, and reactive to light.   Cardiovascular:      Rate and Rhythm: Normal rate and regular rhythm.   Pulmonary:      Effort: Pulmonary effort is normal.      Breath sounds: Normal breath sounds.   Abdominal:      General: Abdomen is flat.      Palpations: Abdomen is soft.   Musculoskeletal:      Right lower leg: No edema.      Left lower leg: No edema.   Skin:     Findings: Rash present.   Neurological:      General: No focal deficit present.      Mental Status: He is alert and oriented to person, place, and time.   Psychiatric:         Mood and Affect: Mood normal.         Behavior: Behavior normal.         Thought Content: Thought content normal.         Judgment: Judgment normal.       Assessment & Plan   Diagnoses and all orders for this visit:    1. Tick bite, unspecified site, initial encounter (Primary)  -     Ehrlichia Antibody Panel  -     [place] Spotted Fever, IgM  -     [place] Spotted Fever, IgG  -     Lyme Disease, Line Blot  -     CBC & Differential  -     Comprehensive Metabolic Panel  -     Alpha-Gal IgE Panel    2. Fatigue, unspecified type  -     CBC & Differential  -      Comprehensive Metabolic Panel  -     TSH    3. Hyperglycemia  -     Comprehensive Metabolic Panel    4. Mixed hyperlipidemia  -     CT Cardiac Calcium Score Without Dye; Future    5. Myositis, unspecified myositis type, unspecified site  -     CK    6. Intrinsic (allergic) eczema  -     Alpha-Gal IgE Panel       Plan above patient working 2 days at [employer]

## 2023-08-14 LAB
A PHAGOCYTOPH IGG TITR SER IF: NEGATIVE {TITER}
A PHAGOCYTOPH IGM TITR SER IF: NEGATIVE {TITER}
ALBUMIN SERPL-MCNC: 4.1 G/DL (ref 3.5–5.2)
ALBUMIN/GLOB SERPL: 2.2 G/DL
ALP SERPL-CCNC: 45 U/L (ref 39–117)
ALPHA-GAL IGE QN: <0.1 KU/L
ALT SERPL-CCNC: 11 U/L (ref 1–41)
AST SERPL-CCNC: 13 U/L (ref 1–40)
B BURGDOR IGG PATRN SER IB-IMP: NEGATIVE
B BURGDOR IGM PATRN SER IB-IMP: NEGATIVE
B BURGDOR18KD IGG SER QL IB: ABNORMAL
B BURGDOR23KD IGG SER QL IB: ABNORMAL
B BURGDOR23KD IGM SER QL IB: ABNORMAL
B BURGDOR28KD IGG SER QL IB: ABNORMAL
B BURGDOR30KD IGG SER QL IB: ABNORMAL
B BURGDOR39KD IGG SER QL IB: ABNORMAL
B BURGDOR39KD IGM SER QL IB: ABNORMAL
B BURGDOR41KD IGG SER QL IB: PRESENT
B BURGDOR41KD IGM SER QL IB: ABNORMAL
B BURGDOR45KD IGG SER QL IB: ABNORMAL
B BURGDOR58KD IGG SER QL IB: ABNORMAL
B BURGDOR66KD IGG SER QL IB: ABNORMAL
B BURGDOR93KD IGG SER QL IB: ABNORMAL
BASOPHILS # BLD AUTO: 0.03 10*3/MM3 (ref 0–0.2)
BASOPHILS NFR BLD AUTO: 0.7 % (ref 0–1.5)
BEEF IGE QN: <0.1 KU/L
BILIRUB SERPL-MCNC: 0.3 MG/DL (ref 0–1.2)
BUN SERPL-MCNC: 14 MG/DL (ref 8–23)
BUN/CREAT SERPL: 12.2 (ref 7–25)
CALCIUM SERPL-MCNC: 10.3 MG/DL (ref 8.6–10.5)
CHLORIDE SERPL-SCNC: 106 MMOL/L (ref 98–107)
CK SERPL-CCNC: 74 U/L (ref 20–200)
CO2 SERPL-SCNC: 28.2 MMOL/L (ref 22–29)
CONV CLASS DESCRIPTION: NORMAL
CREAT SERPL-MCNC: 1.15 MG/DL (ref 0.76–1.27)
E CHAFFEENSIS IGG TITR SER IF: NEGATIVE {TITER}
E CHAFFEENSIS IGM TITR SER IF: NEGATIVE {TITER}
EGFRCR SERPLBLD CKD-EPI 2021: 71.5 ML/MIN/1.73
EOSINOPHIL # BLD AUTO: 0.1 10*3/MM3 (ref 0–0.4)
EOSINOPHIL NFR BLD AUTO: 2.2 % (ref 0.3–6.2)
ERYTHROCYTE [DISTWIDTH] IN BLOOD BY AUTOMATED COUNT: 11.9 % (ref 12.3–15.4)
GLOBULIN SER CALC-MCNC: 1.9 GM/DL
GLUCOSE SERPL-MCNC: 95 MG/DL (ref 65–99)
HCT VFR BLD AUTO: 37.7 % (ref 37.5–51)
HGB BLD-MCNC: 12.4 G/DL (ref 13–17.7)
IGE SERPL-ACNC: 31 IU/ML (ref 6–495)
IMM GRANULOCYTES # BLD AUTO: 0.02 10*3/MM3 (ref 0–0.05)
IMM GRANULOCYTES NFR BLD AUTO: 0.4 % (ref 0–0.5)
LAMB IGE QN: <0.1 KU/L
LYMPHOCYTES # BLD AUTO: 1.17 10*3/MM3 (ref 0.7–3.1)
LYMPHOCYTES NFR BLD AUTO: 26.2 % (ref 19.6–45.3)
MCH RBC QN AUTO: 30.6 PG (ref 26.6–33)
MCHC RBC AUTO-ENTMCNC: 32.9 G/DL (ref 31.5–35.7)
MCV RBC AUTO: 93.1 FL (ref 79–97)
MONOCYTES # BLD AUTO: 0.42 10*3/MM3 (ref 0.1–0.9)
MONOCYTES NFR BLD AUTO: 9.4 % (ref 5–12)
NEUTROPHILS # BLD AUTO: 2.72 10*3/MM3 (ref 1.7–7)
NEUTROPHILS NFR BLD AUTO: 61.1 % (ref 42.7–76)
NRBC BLD AUTO-RTO: 0 /100 WBC (ref 0–0.2)
PLATELET # BLD AUTO: 218 10*3/MM3 (ref 140–450)
PORK IGE QN: <0.1 KU/L
POTASSIUM SERPL-SCNC: 4.9 MMOL/L (ref 3.5–5.2)
PROT SERPL-MCNC: 6 G/DL (ref 6–8.5)
R RICKETTSI IGG SER QL IA: POSITIVE
R RICKETTSI IGG TITR SER IF: NORMAL {TITER}
R RICKETTSI IGM SER-ACNC: 0.24 INDEX (ref 0–0.89)
RBC # BLD AUTO: 4.05 10*6/MM3 (ref 4.14–5.8)
RESULT COMMENT:: NORMAL
SODIUM SERPL-SCNC: 143 MMOL/L (ref 136–145)
TSH SERPL DL<=0.005 MIU/L-ACNC: 0.48 UIU/ML (ref 0.27–4.2)
WBC # BLD AUTO: 4.46 10*3/MM3 (ref 3.4–10.8)

## 2023-08-16 RX ORDER — DOXYCYCLINE 100 MG/1
100 CAPSULE ORAL 2 TIMES DAILY
Qty: 14 CAPSULE | Refills: 0 | Status: SHIPPED | OUTPATIENT
Start: 2023-08-16 | End: 2023-08-23

## 2023-08-31 ENCOUNTER — TELEPHONE (OUTPATIENT)
Dept: FAMILY MEDICINE CLINIC | Facility: CLINIC | Age: 63
End: 2023-08-31
Payer: MEDICARE

## 2023-08-31 DIAGNOSIS — A77.0 ROCKY MOUNTAIN SPOTTED FEVER: Primary | ICD-10-CM

## 2023-08-31 NOTE — TELEPHONE ENCOUNTER
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  PATIENT WANTS TO KNOW IF HE'LL BE COMPLETELY CURED OF [place] FEVER OR IF HE'LL CONTINUE TO EXPERIENCE SYMPTOMS.

## 2023-09-05 PROBLEM — A77.0 RMSF (ROCKY MOUNTAIN SPOTTED FEVER): Status: ACTIVE | Noted: 2023-09-05

## 2023-09-05 RX ORDER — DOXYCYCLINE HYCLATE 100 MG/1
100 CAPSULE ORAL 2 TIMES DAILY
Qty: 14 CAPSULE | Refills: 0 | Status: SHIPPED | OUTPATIENT
Start: 2023-09-05 | End: 2023-09-12

## 2023-09-05 NOTE — TELEPHONE ENCOUNTER
Pt calling and doesn't know why he needs to take this medication again--Gives him diarrhea.  Wanting to know if the symptoms are something you deal with or how long does kathie mountain spider last?  Still having headaches and abd pain at times.       [facility]

## 2023-09-05 NOTE — TELEPHONE ENCOUNTER
Pt was contacted and advised.  He will  med and wait to start but says he does want to take it at some point.

## 2023-10-06 ENCOUNTER — OFFICE VISIT (OUTPATIENT)
Dept: FAMILY MEDICINE CLINIC | Facility: CLINIC | Age: 63
End: 2023-10-06
Payer: MEDICARE

## 2023-10-06 VITALS
HEIGHT: 65 IN | TEMPERATURE: 97.6 F | BODY MASS INDEX: 25.99 KG/M2 | WEIGHT: 156 LBS | OXYGEN SATURATION: 98 % | SYSTOLIC BLOOD PRESSURE: 118 MMHG | DIASTOLIC BLOOD PRESSURE: 70 MMHG | RESPIRATION RATE: 18 BRPM | HEART RATE: 71 BPM

## 2023-10-06 DIAGNOSIS — Z23 NEED FOR INFLUENZA VACCINATION: ICD-10-CM

## 2023-10-06 DIAGNOSIS — R53.83 FATIGUE, UNSPECIFIED TYPE: Primary | ICD-10-CM

## 2023-10-06 DIAGNOSIS — E78.2 MIXED HYPERLIPIDEMIA: ICD-10-CM

## 2023-10-06 DIAGNOSIS — R73.9 HYPERGLYCEMIA: ICD-10-CM

## 2023-10-06 DIAGNOSIS — R41.3 MEMORY LOSS: ICD-10-CM

## 2023-10-06 DIAGNOSIS — Z00.00 MEDICARE ANNUAL WELLNESS VISIT, SUBSEQUENT: ICD-10-CM

## 2023-10-06 DIAGNOSIS — M60.9 MYOSITIS, UNSPECIFIED MYOSITIS TYPE, UNSPECIFIED SITE: ICD-10-CM

## 2023-10-06 DIAGNOSIS — Z12.5 SPECIAL SCREENING FOR MALIGNANT NEOPLASM OF PROSTATE: ICD-10-CM

## 2023-10-06 LAB
BILIRUB BLD-MCNC: ABNORMAL MG/DL
CLARITY, POC: CLEAR
COLOR UR: YELLOW
GLUCOSE UR STRIP-MCNC: NEGATIVE MG/DL
KETONES UR QL: NEGATIVE
LEUKOCYTE EST, POC: NEGATIVE
NITRITE UR-MCNC: NEGATIVE MG/ML
PH UR: 5.5 [PH] (ref 5–8)
PROT UR STRIP-MCNC: NEGATIVE MG/DL
RBC # UR STRIP: NEGATIVE /UL
SP GR UR: 1.03 (ref 1–1.03)
UROBILINOGEN UR QL: NORMAL

## 2023-10-06 NOTE — PROGRESS NOTES
The ABCs of the Annual Wellness Visit  Subsequent Medicare Wellness Visit    Subjective        Bryant Delgado is a 63 y.o. male who presents for a Subsequent Medicare Wellness Visit.    The following portions of the patient's history were reviewed and   updated as appropriate: allergies, current medications, past family history, past medical history, past social history, past surgical history, and problem list.    Review of Systems   Constitutional:         Took antibiotics for remote [place] spotted fever exposure   Respiratory:  Negative for shortness of breath.    Cardiovascular:  Negative for chest pain and leg swelling.        Coronary calcium scoring-low risk   Gastrointestinal:         Colonoscopy 2023   Musculoskeletal:  Positive for arthralgias, back pain and neck pain.   All other systems reviewed and are negative.     Compared to one year ago, the patient feels his physical   health is the same.    Compared to one year ago, the patient feels his mental   health is the same.    Recent Hospitalizations:  He was not admitted to the hospital during the last year.       Current Medical Providers:  Patient Care Team:  Bennett Villanueva MD as PCP - General (Family Medicine)    Outpatient Medications Prior to Visit   Medication Sig Dispense Refill    Cholecalciferol (VITAMIN D3) 125 MCG (5000 UT) capsule capsule Take 1 capsule by mouth Daily.      Cyanocobalamin (VITAMIN B 12) 500 MCG tablet Take 2 tablets by mouth Daily.      gabapentin (NEURONTIN) 600 MG tablet 3 (Three) Times a Day.  2    HYDROcodone-acetaminophen (NORCO)  MG per tablet Take 1 tablet by mouth 3 (Three) Times a Day.      lisinopril (PRINIVIL,ZESTRIL) 20 MG tablet Take 1 tablet by mouth 2 (Two) Times a Day. 180 tablet 3    meloxicam (MOBIC) 15 MG tablet Take 1 tablet by mouth Daily As Needed.      metoprolol succinate XL (TOPROL-XL) 50 MG 24 hr tablet Take 1 tablet by mouth Daily. 90 tablet 3    pravastatin (PRAVACHOL) 40 MG  "tablet Take 0.5 tablets by mouth Every Night. 90 tablet 1    pravastatin (PRAVACHOL) 40 MG tablet Take 1 tablet by mouth Every Night.       No facility-administered medications prior to visit.       Opioid medication/s are on active medication list.  and I have evaluated his active treatment plan and pain score trends (see table).  Vitals:    10/06/23 1008   PainSc: 0-No pain     I have reviewed the chart for potential of high risk medication and harmful drug interactions in the elderly.          Aspirin is not on active medication list.  Aspirin use is not indicated based on review of current medical condition/s. Risk of harm outweighs potential benefits.  .    Patient Active Problem List   Diagnosis    Sensorineural hearing loss (SNHL) of both ears    Tinnitus of both ears    Auditory hallucinations    RMSF ([place] spotted fever)     Advance Care Planning  Advance Directive is not on file.  ACP discussion was declined by the patient. Patient does not have an advance directive, declines further assistance.     Objective    Vitals:    10/06/23 1008   BP: 118/70   BP Location: Left arm   Patient Position: Sitting   Cuff Size: Adult   Pulse: 71   Resp: 18   Temp: 97.6 °F (36.4 °C)   TempSrc: Temporal   SpO2: 98%   Weight: 70.8 kg (156 lb)   Height: 165.1 cm (65\")   PainSc: 0-No pain     Estimated body mass index is 25.96 kg/m² as calculated from the following:    Height as of this encounter: 165.1 cm (65\").    Weight as of this encounter: 70.8 kg (156 lb).    BMI is >= 25 and <30. (Overweight) The following options were offered after discussion;: exercise counseling/recommendations      Physical Exam  Vitals and nursing note reviewed.   Constitutional:       Appearance: Normal appearance.   HENT:      Right Ear: Tympanic membrane and ear canal normal.      Left Ear: Tympanic membrane and ear canal normal.      Mouth/Throat:      Mouth: Mucous membranes are moist.      Pharynx: Oropharynx is clear.   Eyes:      " Extraocular Movements: Extraocular movements intact.      Pupils: Pupils are equal, round, and reactive to light.   Neck:      Vascular: No carotid bruit.   Cardiovascular:      Rate and Rhythm: Normal rate and regular rhythm.      Pulses: Normal pulses.      Heart sounds: Normal heart sounds.   Pulmonary:      Effort: Pulmonary effort is normal.      Breath sounds: Normal breath sounds.   Genitourinary:     Penis: Normal.       Testes: Normal.      Prostate: Normal.      Comments: No testicular masses inguinal hernia or adenopathy prostate 2+ no nodules  Musculoskeletal:      Right lower leg: No edema.      Left lower leg: No edema.   Lymphadenopathy:      Cervical: No cervical adenopathy.   Skin:     General: Skin is warm and dry.   Neurological:      General: No focal deficit present.      Mental Status: He is alert and oriented to person, place, and time.   Psychiatric:         Mood and Affect: Mood normal.         Behavior: Behavior normal.         Thought Content: Thought content normal.         Judgment: Judgment normal.       Does the patient have evidence of cognitive impairment?   No            HEALTH RISK ASSESSMENT    Smoking Status:  Social History     Tobacco Use   Smoking Status Never   Smokeless Tobacco Current    Types: Snuff       Alcohol Consumption:  Social History     Substance and Sexual Activity   Alcohol Use No       Fall Risk Screen:    STEADI Fall Risk Assessment was completed, and patient is at LOW risk for falls.Assessment completed on:10/6/2023    Depression Screening:      10/6/2023    10:12 AM   PHQ-2/PHQ-9 Depression Screening   Little Interest or Pleasure in Doing Things 0-->not at all   Feeling Down, Depressed or Hopeless 0-->not at all   PHQ-9: Brief Depression Severity Measure Score 0       Health Habits and Functional and Cognitive Screening:      10/6/2023    10:13 AM   Functional & Cognitive Status   Do you have difficulty preparing food and eating? No   Do you have difficulty  bathing yourself, getting dressed or grooming yourself? No   Do you have difficulty using the toilet? No   Do you have difficulty moving around from place to place? No   Do you have trouble with steps or getting out of a bed or a chair? No   Current Diet Well Balanced Diet   Dental Exam Up to date   Eye Exam Up to date   Exercise (times per week) 3 times per week   Current Exercises Include Yard Work   Do you need help using the phone?  No   Are you deaf or do you have serious difficulty hearing?  No   Do you need help to go to places out of walking distance? No   Do you need help shopping? No   Do you need help preparing meals?  No   Do you need help with housework?  No   Do you need help with laundry? No   Do you need help taking your medications? No   Do you need help managing money? No   Do you ever drive or ride in a car without wearing a seat belt? No   Have you felt unusual stress, anger or loneliness in the last month? No   Who do you live with? Spouse   If you need help, do you have trouble finding someone available to you? No   Have you been bothered in the last four weeks by sexual problems? No   Do you have difficulty concentrating, remembering or making decisions? No       Age-appropriate Screening Schedule:  Refer to the list below for future screening recommendations based on patient's age, sex and/or medical conditions. Orders for these recommended tests are listed in the plan section. The patient has been provided with a written plan.    Health Maintenance   Topic Date Due    BMI FOLLOWUP  Never done    COVID-19 Vaccine (1) 10/07/2024 (Originally 1960)    LIPID PANEL  04/03/2024    ANNUAL WELLNESS VISIT  10/06/2024    COLORECTAL CANCER SCREENING  06/27/2028    HEPATITIS C SCREENING  Completed    INFLUENZA VACCINE  Completed    ZOSTER VACCINE  Completed    Pneumococcal Vaccine 0-64  Aged Out    TDAP/TD VACCINES  Discontinued            CMS Preventative Services Quick Reference  Risk Factors  Identified During Encounter:    Fall Risk-High or Moderate: Discussed Fall Prevention in the home    The above risks/problems have been discussed with the patient.  Pertinent information has been shared with the patient in the After Visit Summary.    Diagnoses and all orders for this visit:    1. Fatigue, unspecified type (Primary)  -     TSH  -     T4, free  -     CBC & Differential    2. Hyperglycemia  -     POC Urinalysis Dipstick, Multipro    3. Mixed hyperlipidemia  -     Comprehensive Metabolic Panel  -     Lipid Panel    4. Special screening for malignant neoplasm of prostate  -     PSA Screen    5. Memory loss  -     Vitamin B12  -     Folate    6. Myositis, unspecified myositis type, unspecified site  -     CK    7. Medicare annual wellness visit, subsequent    8. Need for influenza vaccination  -     Fluzone (or Fluarix & Flulaval for VFC) >6mos    Plan above-flu shot given-refuses COVID shot    Follow Up:   Next Medicare Wellness visit to be scheduled in 1 year.      An After Visit Summary and PPPS were made available to the patient.    Leandro Villanueva MD

## 2023-10-06 NOTE — PATIENT INSTRUCTIONS
Advance Care Planning and Advance Directives     You make decisions on a daily basis - decisions about where you want to live, your career, your home, your life. Perhaps one of the most important decisions you face is your choice for future medical care. Take time to talk with your family and your healthcare team and start planning today.  Advance Care Planning is a process that can help you:  Understand possible future healthcare decisions in light of your own experiences  Reflect on those decision in light of your goals and values  Discuss your decisions with those closest to you and the healthcare professionals that care for you  Make a plan by creating a document that reflects your wishes    Surrogate Decision Maker  In the event of a medical emergency, which has left you unable to communicate or to make your own decisions, you would need someone to make decisions for you.  It is important to discuss your preferences for medical treatment with this person while you are in good health.     Qualities of a surrogate decision maker:  Willing to take on this role and responsibility  Knows what you want for future medical care  Willing to follow your wishes even if they don't agree with them  Able to make difficult medical decisions under stressful circumstances    Advance Directives  These are legal documents you can create that will guide your healthcare team and decision maker(s) when needed. These documents can be stored in the electronic medical record.    Living Will - a legal document to guide your care if you have a terminal condition or a serious illness and are unable to communicate. States vary by statute in document names/types, but most forms may include one or more of the following:        -  Directions regarding life-prolonging treatments        -  Directions regarding artificially provided nutrition/hydration        -  Choosing a healthcare decision maker        -  Direction regarding organ/tissue  donation    Durable Power of  for Healthcare - this document names an -in-fact to make medical decisions for you, but it may also allow this person to make personal and financial decisions for you. Please seek the advice of an  if you need this type of document.    **Advance Directives are not required and no one may discriminate against you if you do not sign one.    Medical Orders  Many states allow specific forms/orders signed by your physician to record your wishes for medical treatment in your current state of health. This form, signed in personal communication with your physician, addresses resuscitation and other medical interventions that you may or may not want.      For more information or to schedule a time with a [facility] Advance Care Planning Facilitator contact: [facility] or call [phone] and someone will contact you directly.    Medicare Wellness  Personal Prevention Plan of Service     Date of Office Visit:    Encounter Provider:  Bennett Villanueva MD  Place of Service:  [facility]  Patient Name: Bryant Delgado  :  1960    As part of the Medicare Wellness portion of your visit today, we are providing you with this personalized preventive plan of services (PPPS). This plan is based upon recommendations of the [place] Preventive Services Task Force (USPSTF) and the Advisory Committee on Immunization Practices (ACIP).    This lists the preventive care services that should be considered, and provides dates of when you are due. Items listed as completed are up-to-date and do not require any further intervention.    Health Maintenance   Topic Date Due   • BMI FOLLOWUP  Never done   • COVID-19 Vaccine (1) Never done   • INFLUENZA VACCINE  2023   • ANNUAL WELLNESS VISIT  10/03/2023   • LIPID PANEL  2024   • COLORECTAL CANCER SCREENING  2028   • HEPATITIS C SCREENING  Completed   • ZOSTER  VACCINE  Completed   • Pneumococcal Vaccine 0-64  Aged Out   • TDAP/TD VACCINES  Discontinued       No orders of the defined types were placed in this encounter.      No follow-ups on file.

## 2023-10-07 LAB
ALBUMIN SERPL-MCNC: 4.5 G/DL (ref 3.5–5.2)
ALBUMIN/GLOB SERPL: 2.4 G/DL
ALP SERPL-CCNC: 47 U/L (ref 39–117)
ALT SERPL-CCNC: 14 U/L (ref 1–41)
AST SERPL-CCNC: 20 U/L (ref 1–40)
BASOPHILS # BLD AUTO: 0.03 10*3/MM3 (ref 0–0.2)
BASOPHILS NFR BLD AUTO: 0.5 % (ref 0–1.5)
BILIRUB SERPL-MCNC: 0.6 MG/DL (ref 0–1.2)
BUN SERPL-MCNC: 11 MG/DL (ref 8–23)
BUN/CREAT SERPL: 11.3 (ref 7–25)
CALCIUM SERPL-MCNC: 10.5 MG/DL (ref 8.6–10.5)
CHLORIDE SERPL-SCNC: 107 MMOL/L (ref 98–107)
CHOLEST SERPL-MCNC: 169 MG/DL (ref 0–200)
CK SERPL-CCNC: 197 U/L (ref 20–200)
CO2 SERPL-SCNC: 27.2 MMOL/L (ref 22–29)
CREAT SERPL-MCNC: 0.97 MG/DL (ref 0.76–1.27)
EGFRCR SERPLBLD CKD-EPI 2021: 87.7 ML/MIN/1.73
EOSINOPHIL # BLD AUTO: 0.18 10*3/MM3 (ref 0–0.4)
EOSINOPHIL NFR BLD AUTO: 2.8 % (ref 0.3–6.2)
ERYTHROCYTE [DISTWIDTH] IN BLOOD BY AUTOMATED COUNT: 11.9 % (ref 12.3–15.4)
FOLATE SERPL-MCNC: 16 NG/ML (ref 4.78–24.2)
GLOBULIN SER CALC-MCNC: 1.9 GM/DL
GLUCOSE SERPL-MCNC: 98 MG/DL (ref 65–99)
HCT VFR BLD AUTO: 40.1 % (ref 37.5–51)
HDLC SERPL-MCNC: 56 MG/DL (ref 40–60)
HGB BLD-MCNC: 13.5 G/DL (ref 13–17.7)
IMM GRANULOCYTES # BLD AUTO: 0.01 10*3/MM3 (ref 0–0.05)
IMM GRANULOCYTES NFR BLD AUTO: 0.2 % (ref 0–0.5)
LDLC SERPL CALC-MCNC: 100 MG/DL (ref 0–100)
LYMPHOCYTES # BLD AUTO: 1.45 10*3/MM3 (ref 0.7–3.1)
LYMPHOCYTES NFR BLD AUTO: 22.8 % (ref 19.6–45.3)
MCH RBC QN AUTO: 31.2 PG (ref 26.6–33)
MCHC RBC AUTO-ENTMCNC: 33.7 G/DL (ref 31.5–35.7)
MCV RBC AUTO: 92.6 FL (ref 79–97)
MONOCYTES # BLD AUTO: 0.57 10*3/MM3 (ref 0.1–0.9)
MONOCYTES NFR BLD AUTO: 9 % (ref 5–12)
NEUTROPHILS # BLD AUTO: 4.11 10*3/MM3 (ref 1.7–7)
NEUTROPHILS NFR BLD AUTO: 64.7 % (ref 42.7–76)
NRBC BLD AUTO-RTO: 0 /100 WBC (ref 0–0.2)
PLATELET # BLD AUTO: 203 10*3/MM3 (ref 140–450)
POTASSIUM SERPL-SCNC: 4.7 MMOL/L (ref 3.5–5.2)
PROT SERPL-MCNC: 6.4 G/DL (ref 6–8.5)
PSA SERPL-MCNC: 0.72 NG/ML (ref 0–4)
RBC # BLD AUTO: 4.33 10*6/MM3 (ref 4.14–5.8)
SODIUM SERPL-SCNC: 142 MMOL/L (ref 136–145)
T4 FREE SERPL-MCNC: 1.06 NG/DL (ref 0.93–1.7)
TRIGL SERPL-MCNC: 67 MG/DL (ref 0–150)
TSH SERPL DL<=0.005 MIU/L-ACNC: 0.31 UIU/ML (ref 0.27–4.2)
VIT B12 SERPL-MCNC: 1145 PG/ML (ref 211–946)
VLDLC SERPL CALC-MCNC: 13 MG/DL (ref 5–40)
WBC # BLD AUTO: 6.35 10*3/MM3 (ref 3.4–10.8)

## 2024-02-05 DIAGNOSIS — E78.2 MIXED HYPERLIPIDEMIA: Primary | ICD-10-CM

## 2024-02-05 RX ORDER — PRAVASTATIN SODIUM 40 MG
40 TABLET ORAL NIGHTLY
Qty: 90 TABLET | Refills: 2 | Status: SHIPPED | OUTPATIENT
Start: 2024-02-05

## 2024-02-05 NOTE — TELEPHONE ENCOUNTER
Rx Refill Note  Requested Prescriptions     Pending Prescriptions Disp Refills    pravastatin (PRAVACHOL) 40 MG tablet [Pharmacy Med Name: PRAVASTATIN SODIUM 40 MG TAB] 45 tablet 2     Sig: TAKE ONE/HALF TABLET BY MOUTH EVERY NIGHT      Last office visit with prescribing clinician: 10/6/2023   Last telemedicine visit with prescribing clinician: Visit date not found   Next office visit with prescribing clinician: 4/12/2024       {TIP  Please add Last Relevant Lab 10/6/23    Cristin Ivy MA  02/05/24, 11:00 CST

## 2024-02-23 RX ORDER — METOPROLOL SUCCINATE 50 MG/1
50 TABLET, EXTENDED RELEASE ORAL DAILY
Qty: 90 TABLET | Refills: 2 | Status: SHIPPED | OUTPATIENT
Start: 2024-02-23

## 2024-02-23 NOTE — TELEPHONE ENCOUNTER
Rx Refill Note  Requested Prescriptions     Pending Prescriptions Disp Refills    metoprolol succinate XL (TOPROL-XL) 50 MG 24 hr tablet [Pharmacy Med Name: METOPROLOL SUCC ER 50 MG TAB] 90 tablet 2     Sig: TAKE ONE TABLET BY MOUTH DAILY      Last office visit with prescribing clinician: 10/6/2023   Last telemedicine visit with prescribing clinician: Visit date not found   Next office visit with prescribing clinician: 4/12/2024       {TIP  Please add Last Relevant Lab 10/6/23    Cristin Ivy MA  02/23/24, 07:32 CST

## 2024-04-23 ENCOUNTER — OFFICE VISIT (OUTPATIENT)
Dept: FAMILY MEDICINE CLINIC | Facility: CLINIC | Age: 64
End: 2024-04-23
Payer: MEDICARE

## 2024-04-23 VITALS
HEART RATE: 72 BPM | OXYGEN SATURATION: 96 % | DIASTOLIC BLOOD PRESSURE: 72 MMHG | WEIGHT: 150.8 LBS | TEMPERATURE: 97.5 F | HEIGHT: 65 IN | RESPIRATION RATE: 18 BRPM | BODY MASS INDEX: 25.12 KG/M2 | SYSTOLIC BLOOD PRESSURE: 120 MMHG

## 2024-04-23 DIAGNOSIS — I10 HYPERTENSION, UNSPECIFIED TYPE: Primary | ICD-10-CM

## 2024-04-23 DIAGNOSIS — R09.89 BRUIT OF RIGHT CAROTID ARTERY: ICD-10-CM

## 2024-04-23 DIAGNOSIS — R53.83 OTHER FATIGUE: ICD-10-CM

## 2024-04-23 DIAGNOSIS — E78.2 MIXED HYPERLIPIDEMIA: Primary | ICD-10-CM

## 2024-04-23 LAB
BILIRUB BLD-MCNC: ABNORMAL MG/DL
CLARITY, POC: CLEAR
COLOR UR: YELLOW
GLUCOSE UR STRIP-MCNC: NEGATIVE MG/DL
KETONES UR QL: NEGATIVE
LEUKOCYTE EST, POC: NEGATIVE
NITRITE UR-MCNC: NEGATIVE MG/ML
PH UR: 6.5 [PH] (ref 5–8)
PROT UR STRIP-MCNC: NEGATIVE MG/DL
RBC # UR STRIP: NEGATIVE /UL
SP GR UR: 1.02 (ref 1–1.03)
UROBILINOGEN UR QL: NORMAL

## 2024-04-23 PROCEDURE — 1160F RVW MEDS BY RX/DR IN RCRD: CPT | Performed by: FAMILY MEDICINE

## 2024-04-23 PROCEDURE — 99213 OFFICE O/P EST LOW 20 MIN: CPT | Performed by: FAMILY MEDICINE

## 2024-04-23 PROCEDURE — 81003 URINALYSIS AUTO W/O SCOPE: CPT | Performed by: FAMILY MEDICINE

## 2024-04-23 PROCEDURE — 1159F MED LIST DOCD IN RCRD: CPT | Performed by: FAMILY MEDICINE

## 2024-04-23 RX ORDER — HYDROCODONE BITARTRATE AND ACETAMINOPHEN 7.5; 325 MG/1; MG/1
1 TABLET ORAL EVERY 6 HOURS PRN
COMMUNITY

## 2024-04-23 NOTE — PROGRESS NOTES
Subjective   Bryant Delgado is a 64 y.o. male.     History of Present Illness  64-year-old with history of hyperlipidemia and hypertension      The following portions of the patient's history were reviewed and updated as appropriate: allergies, current medications, past family history, past medical history, past social history, past surgical history, and problem list.    Review of Systems   Respiratory:  Negative for shortness of breath.    Cardiovascular:  Negative for chest pain and leg swelling.   Genitourinary:         History of renal stones   Musculoskeletal:  Positive for back pain and neck pain.       Objective   Physical Exam  Vitals and nursing note reviewed.   Constitutional:       Appearance: Normal appearance. He is normal weight.   Eyes:      Extraocular Movements: Extraocular movements intact.      Pupils: Pupils are equal, round, and reactive to light.   Neck:      Vascular: Carotid bruit present.      Comments: Bruit right neck  Cardiovascular:      Rate and Rhythm: Normal rate and regular rhythm.   Pulmonary:      Effort: Pulmonary effort is normal.      Breath sounds: Normal breath sounds.   Abdominal:      General: Abdomen is flat.      Palpations: Abdomen is soft.      Tenderness: There is no right CVA tenderness or left CVA tenderness.   Musculoskeletal:      Right lower leg: No edema.      Left lower leg: No edema.   Skin:     General: Skin is warm and dry.   Neurological:      General: No focal deficit present.      Mental Status: He is alert and oriented to person, place, and time.   Psychiatric:         Mood and Affect: Mood normal.         Behavior: Behavior normal.         Thought Content: Thought content normal.         Judgment: Judgment normal.         Assessment & Plan   Diagnoses and all orders for this visit:    1. Mixed hyperlipidemia (Primary)  -     Comprehensive Metabolic Panel  -     Lipid Panel    2. Bruit of right carotid artery  -     US Carotid Bilateral; Future    #2  back pain    Plan plan as above

## 2024-04-24 LAB
ALBUMIN SERPL-MCNC: 4.5 G/DL (ref 3.5–5.2)
ALBUMIN/GLOB SERPL: 2.4 G/DL
ALP SERPL-CCNC: 51 U/L (ref 39–117)
ALT SERPL-CCNC: 13 U/L (ref 1–41)
AST SERPL-CCNC: 15 U/L (ref 1–40)
BILIRUB SERPL-MCNC: 0.4 MG/DL (ref 0–1.2)
BUN SERPL-MCNC: 10 MG/DL (ref 8–23)
BUN/CREAT SERPL: 9.3 (ref 7–25)
CALCIUM SERPL-MCNC: 9.9 MG/DL (ref 8.6–10.5)
CHLORIDE SERPL-SCNC: 105 MMOL/L (ref 98–107)
CHOLEST SERPL-MCNC: 175 MG/DL (ref 0–200)
CO2 SERPL-SCNC: 26.9 MMOL/L (ref 22–29)
CREAT SERPL-MCNC: 1.08 MG/DL (ref 0.76–1.27)
EGFRCR SERPLBLD CKD-EPI 2021: 76.6 ML/MIN/1.73
GLOBULIN SER CALC-MCNC: 1.9 GM/DL
GLUCOSE SERPL-MCNC: 109 MG/DL (ref 65–99)
HDLC SERPL-MCNC: 55 MG/DL (ref 40–60)
LDLC SERPL CALC-MCNC: 108 MG/DL (ref 0–100)
POTASSIUM SERPL-SCNC: 4.5 MMOL/L (ref 3.5–5.2)
PROT SERPL-MCNC: 6.4 G/DL (ref 6–8.5)
SODIUM SERPL-SCNC: 141 MMOL/L (ref 136–145)
TRIGL SERPL-MCNC: 61 MG/DL (ref 0–150)
VLDLC SERPL CALC-MCNC: 12 MG/DL (ref 5–40)

## 2024-04-24 RX ORDER — LISINOPRIL 20 MG/1
20 TABLET ORAL 2 TIMES DAILY
Qty: 180 TABLET | Refills: 3 | Status: SHIPPED | OUTPATIENT
Start: 2024-04-24

## 2024-04-24 NOTE — TELEPHONE ENCOUNTER
Rx Refill Note  Requested Prescriptions     Pending Prescriptions Disp Refills    lisinopril (PRINIVIL,ZESTRIL) 20 MG tablet [Pharmacy Med Name: LISINOPRIL 20 MG TABLET] 180 tablet 2     Sig: TAKE ONE TABLET BY MOUTH TWO TIMES A DAY      Last office visit with prescribing clinician: 4/23/2024   Last telemedicine visit with prescribing clinician: Visit date not found   Next office visit with prescribing clinician: 10/30/2024       {TIP  Please add Last Relevant Lab 4/23/24    Cristin Ivy MA  04/24/24, 09:53 CDT

## 2024-05-03 ENCOUNTER — TELEPHONE (OUTPATIENT)
Dept: FAMILY MEDICINE CLINIC | Facility: CLINIC | Age: 64
End: 2024-05-03
Payer: MEDICARE

## 2024-06-05 ENCOUNTER — OFFICE VISIT (OUTPATIENT)
Dept: FAMILY MEDICINE CLINIC | Facility: CLINIC | Age: 64
End: 2024-06-05
Payer: MEDICARE

## 2024-06-05 VITALS
BODY MASS INDEX: 25.76 KG/M2 | OXYGEN SATURATION: 98 % | RESPIRATION RATE: 16 BRPM | HEART RATE: 62 BPM | HEIGHT: 65 IN | TEMPERATURE: 98.9 F | DIASTOLIC BLOOD PRESSURE: 88 MMHG | SYSTOLIC BLOOD PRESSURE: 140 MMHG | WEIGHT: 154.6 LBS

## 2024-06-05 DIAGNOSIS — R10.30 LOWER ABDOMINAL PAIN: Primary | ICD-10-CM

## 2024-06-05 PROCEDURE — 1159F MED LIST DOCD IN RCRD: CPT | Performed by: FAMILY MEDICINE

## 2024-06-05 PROCEDURE — 99213 OFFICE O/P EST LOW 20 MIN: CPT | Performed by: FAMILY MEDICINE

## 2024-06-05 PROCEDURE — 1160F RVW MEDS BY RX/DR IN RCRD: CPT | Performed by: FAMILY MEDICINE

## 2024-06-05 PROCEDURE — 81003 URINALYSIS AUTO W/O SCOPE: CPT | Performed by: FAMILY MEDICINE

## 2024-06-05 PROCEDURE — 1125F AMNT PAIN NOTED PAIN PRSNT: CPT | Performed by: FAMILY MEDICINE

## 2024-06-05 NOTE — PROGRESS NOTES
Subjective   Bryant Delgado is a 64 y.o. male.     History of Present Illness  64-year-old male with lower abdominal pain recent colonoscopy showed diverticular disease      The following portions of the patient's history were reviewed and updated as appropriate: allergies, current medications, past family history, past medical history, past social history, past surgical history, and problem list.    Review of Systems   Respiratory:  Negative for shortness of breath.    Cardiovascular:  Negative for chest pain and leg swelling.   Gastrointestinal:  Positive for abdominal pain and constipation.        Colonoscopy last year   Genitourinary:  Positive for difficulty urinating and frequency.       Objective   Physical Exam  Nursing note reviewed.   Constitutional:       Appearance: Normal appearance.   Eyes:      Extraocular Movements: Extraocular movements intact.      Pupils: Pupils are equal, round, and reactive to light.   Cardiovascular:      Rate and Rhythm: Normal rate and regular rhythm.   Pulmonary:      Effort: Pulmonary effort is normal.      Breath sounds: Normal breath sounds.   Abdominal:      General: Abdomen is flat.      Tenderness: There is abdominal tenderness.      Comments: Suprapubic tenderness   Musculoskeletal:      Right lower leg: No edema.      Left lower leg: No edema.   Skin:     General: Skin is warm and dry.   Neurological:      General: No focal deficit present.      Mental Status: He is alert and oriented to person, place, and time.   Psychiatric:         Mood and Affect: Mood normal.         Behavior: Behavior normal.         Thought Content: Thought content normal.         Judgment: Judgment normal.         Assessment & Plan   Diagnoses and all orders for this visit:    1. Lower abdominal pain (Primary)  -     POC Urinalysis Dipstick, Multipro  -     CT Abdomen Pelvis Without Contrast; Future         Plan above-return for CMP lipid panel on pravastatin 40

## 2024-06-11 DIAGNOSIS — R06.02 SHORTNESS OF BREATH: ICD-10-CM

## 2024-06-11 DIAGNOSIS — R91.8 PULMONARY INFILTRATE: Primary | ICD-10-CM

## 2024-06-19 ENCOUNTER — TELEPHONE (OUTPATIENT)
Dept: FAMILY MEDICINE CLINIC | Facility: CLINIC | Age: 64
End: 2024-06-19

## 2024-06-19 NOTE — TELEPHONE ENCOUNTER
Patient requesting something for the ringing in his ears. States OTC Lipo Flavonoid is to expensive.  [facility] listed at $25.87 online.

## 2024-06-19 NOTE — TELEPHONE ENCOUNTER
Caller:     DannyDamaso jewellnina Uribe        Relationship: SELF     Best call back number:     [phone]   [phone]     Caller requesting test results: PATIENT     What test was performed: CT LUNG SCAN RESULTS     When was the test performed: 06/17/24    Where was the test performed: [facility]     Additional notes: HE STATES HE HAS RINGING IN BOTH HIS  EARS AND HE IS ALSO REQUESTING SOMETHING TO BE SENT TO THE PHARMACY     HE STATES THE OVER THE COUNTER MEDICATION LIPOPLAVID WOULD BE TO EXPENSIVE FOR HIM AND HE IS REQUESTING A SCRIPT TO BE SENT TO THE PHARMACY       PHARMACY   [facility] #[id number] - [address] [phone] [facility] [phone]  [phone]

## 2024-10-30 ENCOUNTER — OFFICE VISIT (OUTPATIENT)
Dept: FAMILY MEDICINE CLINIC | Facility: CLINIC | Age: 64
End: 2024-10-30
Payer: MEDICARE

## 2024-10-30 VITALS
OXYGEN SATURATION: 100 % | BODY MASS INDEX: 26.09 KG/M2 | DIASTOLIC BLOOD PRESSURE: 74 MMHG | SYSTOLIC BLOOD PRESSURE: 128 MMHG | TEMPERATURE: 97.6 F | WEIGHT: 152.8 LBS | HEART RATE: 68 BPM | HEIGHT: 64 IN | RESPIRATION RATE: 18 BRPM

## 2024-10-30 DIAGNOSIS — E78.2 MIXED HYPERLIPIDEMIA: Primary | ICD-10-CM

## 2024-10-30 DIAGNOSIS — Z12.5 SPECIAL SCREENING FOR MALIGNANT NEOPLASM OF PROSTATE: ICD-10-CM

## 2024-10-30 DIAGNOSIS — R53.83 FATIGUE, UNSPECIFIED TYPE: ICD-10-CM

## 2024-10-30 DIAGNOSIS — Z00.00 MEDICARE ANNUAL WELLNESS VISIT, SUBSEQUENT: ICD-10-CM

## 2024-10-30 DIAGNOSIS — R41.3 MEMORY LOSS: ICD-10-CM

## 2024-10-30 DIAGNOSIS — R73.9 HYPERGLYCEMIA: ICD-10-CM

## 2024-10-30 LAB
BILIRUB BLD-MCNC: NEGATIVE MG/DL
CLARITY, POC: CLEAR
COLOR UR: YELLOW
GLUCOSE UR STRIP-MCNC: NEGATIVE MG/DL
KETONES UR QL: NEGATIVE
LEUKOCYTE EST, POC: NEGATIVE
NITRITE UR-MCNC: NEGATIVE MG/ML
PH UR: 6 [PH] (ref 5–8)
PROT UR STRIP-MCNC: NEGATIVE MG/DL
RBC # UR STRIP: NEGATIVE /UL
SP GR UR: 1 (ref 1–1.03)
UROBILINOGEN UR QL: NORMAL

## 2024-10-30 RX ORDER — OXYCODONE AND ACETAMINOPHEN 7.5; 325 MG/1; MG/1
1 TABLET ORAL 4 TIMES DAILY PRN
COMMUNITY
Start: 2024-10-15

## 2024-10-30 NOTE — PROGRESS NOTES
Subjective   The ABCs of the Annual Wellness Visit  Medicare Wellness Visit      Bryant Delgado is a 64 y.o. patient who presents for a Medicare Wellness Visit.    The following portions of the patient's history were reviewed and   updated as appropriate: allergies, current medications, past family history, past medical history, past social history, past surgical history, and problem list.    Compared to one year ago, the patient's physical   health is the same.  Compared to one year ago, the patient's mental   health is the same.    Recent Hospitalizations:  He was not admitted to the hospital during the last year.     Current Medical Providers:  Patient Care Team:  Bennett Villanueva MD as PCP - General (Family Medicine)    Outpatient Medications Prior to Visit   Medication Sig Dispense Refill    Cholecalciferol (VITAMIN D3) 125 MCG (5000 UT) capsule capsule Take 1 capsule by mouth Daily.      Cyanocobalamin (VITAMIN B 12) 500 MCG tablet Take 2 tablets by mouth Daily.      gabapentin (NEURONTIN) 600 MG tablet 3 (Three) Times a Day.  2    lisinopril (PRINIVIL,ZESTRIL) 20 MG tablet TAKE ONE TABLET BY MOUTH TWO TIMES A  tablet 3    meloxicam (MOBIC) 15 MG tablet Take 1 tablet by mouth Daily As Needed.      metoprolol succinate XL (TOPROL-XL) 50 MG 24 hr tablet TAKE ONE TABLET BY MOUTH DAILY (Patient taking differently: Take 0.5 tablets by mouth 2 (Two) Times a Day.) 90 tablet 2    oxyCODONE-acetaminophen (PERCOCET) 7.5-325 MG per tablet Take 1 tablet by mouth 4 (Four) Times a Day As Needed for Severe Pain.      pravastatin (PRAVACHOL) 40 MG tablet Take 1 tablet by mouth Every Night. 90 tablet 2    HYDROcodone-acetaminophen (NORCO) 7.5-325 MG per tablet Take 1 tablet by mouth Every 6 (Six) Hours As Needed for Moderate Pain.       No facility-administered medications prior to visit.     Opioid medication/s are on active medication list.  and I have evaluated his active treatment plan and pain score  "trends (see table).  Vitals:    10/30/24 1044   PainSc: 0-No pain     I have reviewed the chart for potential of high risk medication and harmful drug interactions in the elderly.        Aspirin is not on active medication list.  Aspirin use is not indicated based on review of current medical condition/s. Risk of harm outweighs potential benefits.  .    Patient Active Problem List   Diagnosis    Sensorineural hearing loss (SNHL) of both ears    Tinnitus of both ears    Auditory hallucinations    RMSF (Bryce Mountain spotted fever)     Advance Care Planning Advance Directive is not on file.  ACP discussion was declined by the patient. Patient does not have an advance directive, declines further assistance.            Objective   Vitals:    10/30/24 1044   BP: 128/74   BP Location: Left arm   Patient Position: Sitting   Cuff Size: Adult   Pulse: 68   Resp: 18   Temp: 97.6 °F (36.4 °C)   TempSrc: Temporal   SpO2: 100%   Weight: 69.3 kg (152 lb 12.8 oz)   Height: 162.6 cm (64\")   PainSc: 0-No pain       Estimated body mass index is 26.23 kg/m² as calculated from the following:    Height as of this encounter: 162.6 cm (64\").    Weight as of this encounter: 69.3 kg (152 lb 12.8 oz).    BMI is >= 25 and <30. (Overweight) The following options were offered after discussion;: weight loss educational material (shared in after visit summary) and exercise counseling/recommendations       Does the patient have evidence of cognitive impairment? No                                                                                                Health  Risk Assessment    Smoking Status:  Social History     Tobacco Use   Smoking Status Never   Smokeless Tobacco Current    Types: Snuff     Alcohol Consumption:  Social History     Substance and Sexual Activity   Alcohol Use No       Fall Risk Screen  STEADI Fall Risk Assessment was completed, and patient is at LOW risk for falls.Assessment completed on:10/30/2024    Depression " Screening:      10/30/2024    10:48 AM   PHQ-2/PHQ-9 Depression Screening   Little interest or pleasure in doing things Not at all   Feeling down, depressed, or hopeless Not at all     Health Habits and Functional and Cognitive Screening:      10/30/2024    10:49 AM   Functional & Cognitive Status   Do you have difficulty preparing food and eating? No   Do you have difficulty bathing yourself, getting dressed or grooming yourself? No   Do you have difficulty using the toilet? No   Do you have difficulty moving around from place to place? No   Do you have trouble with steps or getting out of a bed or a chair? No   Current Diet Well Balanced Diet   Dental Exam Up to date   Eye Exam Up to date   Exercise (times per week) 4 times per week   Current Exercises Include Weightlifting;Walking   Do you need help using the phone?  No   Are you deaf or do you have serious difficulty hearing?  No   Do you need help to go to places out of walking distance? No   Do you need help shopping? No   Do you need help preparing meals?  No   Do you need help with housework?  No   Do you need help with laundry? No   Do you need help taking your medications? No   Do you need help managing money? No   Do you ever drive or ride in a car without wearing a seat belt? No   Have you felt unusual stress, anger or loneliness in the last month? No   Who do you live with? Spouse   If you need help, do you have trouble finding someone available to you? No   Have you been bothered in the last four weeks by sexual problems? No   Do you have difficulty concentrating, remembering or making decisions? No           Age-appropriate Screening Schedule:  Refer to the list below for future screening recommendations based on patient's age, sex and/or medical conditions. Orders for these recommended tests are listed in the plan section. The patient has been provided with a written plan.    Health Maintenance List  Health Maintenance   Topic Date Due    COVID-19  "Vaccine (1 - 2023-24 season) Never done    BMI FOLLOWUP  10/06/2024    LIPID PANEL  06/10/2025    ANNUAL WELLNESS VISIT  10/30/2025    COLORECTAL CANCER SCREENING  06/27/2028    HEPATITIS C SCREENING  Completed    INFLUENZA VACCINE  Completed    ZOSTER VACCINE  Completed    Pneumococcal Vaccine 0-64  Aged Out    TDAP/TD VACCINES  Discontinued                                                                                                                                                CMS Preventative Services Quick Reference  Risk Factors Identified During Encounter  Fall Risk-High or Moderate: Discussed Fall Prevention in the home    The above risks/problems have been discussed with the patient.  Pertinent information has been shared with the patient in the After Visit Summary.  An After Visit Summary and PPPS were made available to the patient.    Follow Up:   Next Medicare Wellness visit to be scheduled in 1 year.         Additional E&M Note during same encounter follows:  Patient has additional, significant, and separately identifiable condition(s)/problem(s) that require work above and beyond the Medicare Wellness Visit     Chief Complaint  Medicare Wellness-subsequent (Not fasting)    Subjective   64-year-old for annual Medicare wellness physical    Bryant is also being seen today for an annual adult preventative physical exam.     Review of Systems   Respiratory:  Negative for shortness of breath.    Cardiovascular:  Negative for chest pain and leg swelling.   Gastrointestinal:         Colon screening up-to-date   Endocrine: Negative for polydipsia, polyphagia and polyuria.   Musculoskeletal:  Positive for arthralgias, back pain and neck pain.   All other systems reviewed and are negative.             Objective   Vital Signs:  /74 (BP Location: Left arm, Patient Position: Sitting, Cuff Size: Adult)   Pulse 68   Temp 97.6 °F (36.4 °C) (Temporal)   Resp 18   Ht 162.6 cm (64\")   Wt 69.3 kg (152 lb 12.8 " oz)   SpO2 100%   BMI 26.23 kg/m²   Physical Exam  Vitals and nursing note reviewed.   Constitutional:       Appearance: Normal appearance.   HENT:      Right Ear: Tympanic membrane and ear canal normal.      Left Ear: Tympanic membrane and ear canal normal.      Mouth/Throat:      Mouth: Mucous membranes are moist.   Eyes:      Extraocular Movements: Extraocular movements intact.      Pupils: Pupils are equal, round, and reactive to light.   Neck:      Vascular: No carotid bruit.   Cardiovascular:      Rate and Rhythm: Normal rate and regular rhythm.      Pulses: Normal pulses.      Heart sounds: Normal heart sounds.   Pulmonary:      Effort: Pulmonary effort is normal.      Breath sounds: Normal breath sounds.   Abdominal:      General: Abdomen is flat.      Palpations: Abdomen is soft. There is no mass.   Genitourinary:     Penis: Normal.       Testes: Normal.      Prostate: Normal.      Comments: No testicular masses inguinal hernia or adenopathy-prostate 2+ no nodules  Musculoskeletal:      Right lower leg: No edema.      Left lower leg: No edema.   Lymphadenopathy:      Cervical: No cervical adenopathy.   Skin:     General: Skin is warm and dry.   Neurological:      General: No focal deficit present.      Mental Status: He is alert and oriented to person, place, and time.   Psychiatric:         Mood and Affect: Mood normal.         Behavior: Behavior normal.         Thought Content: Thought content normal.         Judgment: Judgment normal.         The following data was reviewed by: Bennett Villanueva MD on 10/30/2024:        Assessment and Plan     Plan above-has had flu shot-refuses COVID-return for fasting lab       [unfilled]  Orders Placed This Encounter   Procedures    TSH     Standing Status:   Future     Standing Expiration Date:   10/30/2025     Order Specific Question:   Release to patient     Answer:   Routine Release [[id number]]    T4, free     Standing Status:   Future     Standing  Expiration Date:   10/30/2025     Order Specific Question:   Release to patient     Answer:   Routine Release [[id number]]    Comprehensive Metabolic Panel     Standing Status:   Future     Standing Expiration Date:   10/30/2025     Order Specific Question:   Release to patient     Answer:   Routine Release [[id number]]    Hemoglobin A1c     Standing Status:   Future     Standing Expiration Date:   10/30/2025     Order Specific Question:   Release to patient     Answer:   Routine Release [[id number]]    Lipid Panel     Standing Status:   Future     Standing Expiration Date:   10/30/2025     Order Specific Question:   Release to patient     Answer:   Routine Release [[id number]]    PSA Screen     Standing Status:   Future     Standing Expiration Date:   10/30/2025     Order Specific Question:   Release to patient     Answer:   Routine Release [[id number]]    Vitamin B12     Standing Status:   Future     Standing Expiration Date:   10/30/2025     Order Specific Question:   Release to patient     Answer:   Routine Release [[id number]]    Folate     Standing Status:   Future     Standing Expiration Date:   10/30/2025     Order Specific Question:   Release to patient     Answer:   Routine Release [[id number]]    POC Urinalysis Dipstick, Multipro     Order Specific Question:   Release to patient     Answer:   Routine Release [[id number]]    CBC & Differential     Standing Status:   Future     Standing Expiration Date:   10/30/2025     Order Specific Question:   Manual Differential     Answer:   No     Order Specific Question:   Release to patient     Answer:   Routine Release [[id number]]             Follow Up   Return in about 6 months (around 4/30/2025), or if symptoms worsen or fail to improve.  Patient was given instructions and counseling regarding his condition or for health maintenance advice. Please see specific information pulled into the AVS if appropriate.   (4) no impairment

## 2024-10-30 NOTE — PATIENT INSTRUCTIONS
Medicare Wellness  Personal Prevention Plan of Service     Date of Office Visit:    Encounter Provider:  Bennett Villanueva MD  Place of Service:  [facility]  Patient Name: Bryant Delgado  :  1960    As part of the Medicare Wellness portion of your visit today, we are providing you with this personalized preventive plan of services (PPPS). This plan is based upon recommendations of the [place] Preventive Services Task Force (USPSTF) and the Advisory Committee on Immunization Practices (ACIP).    This lists the preventive care services that should be considered, and provides dates of when you are due. Items listed as completed are up-to-date and do not require any further intervention.    Health Maintenance   Topic Date Due   • COVID-19 Vaccine ( season) Never done   • ANNUAL WELLNESS VISIT  10/06/2024   • BMI FOLLOWUP  10/06/2024   • LIPID PANEL  06/10/2025   • COLORECTAL CANCER SCREENING  2028   • HEPATITIS C SCREENING  Completed   • INFLUENZA VACCINE  Completed   • ZOSTER VACCINE  Completed   • Pneumococcal Vaccine 0-64  Aged Out   • TDAP/TD VACCINES  Discontinued       No orders of the defined types were placed in this encounter.      No follow-ups on file.

## 2024-11-04 ENCOUNTER — TELEPHONE (OUTPATIENT)
Dept: FAMILY MEDICINE CLINIC | Facility: CLINIC | Age: 64
End: 2024-11-04
Payer: MEDICARE

## 2024-11-04 NOTE — TELEPHONE ENCOUNTER
Caller: Bryant Delgado    Relationship to patient: Self    Best call back number:     [phone]       Patient is needing: PATIENT CALLED AND WANTED TO LET THE OFFICE KNOW THAT HE WILL BE IN FOR LABS AROUND 10:30 ON 10/5.

## 2024-11-14 RX ORDER — METOPROLOL SUCCINATE 50 MG/1
25 TABLET, EXTENDED RELEASE ORAL 2 TIMES DAILY
Qty: 90 TABLET | Refills: 3 | Status: SHIPPED | OUTPATIENT
Start: 2024-11-14

## 2024-11-14 NOTE — TELEPHONE ENCOUNTER
Rx Refill Note  Requested Prescriptions     Pending Prescriptions Disp Refills    metoprolol succinate XL (TOPROL-XL) 50 MG 24 hr tablet [Pharmacy Med Name: METOPROLOL SUCC ER 50 MG TAB] 90 tablet 2     Sig: TAKE 1 TABLET BY MOUTH EVERY DAY      Last office visit with prescribing clinician: 10/30/2024   Last telemedicine visit with prescribing clinician: Visit date not found   Next office visit with prescribing clinician: 5/7/2025       {TIP  Please add Last Relevant Lab 11/5/24    Cristin Ivy MA  11/14/24, 12:23 CST

## 2025-01-07 ENCOUNTER — OFFICE VISIT (OUTPATIENT)
Dept: FAMILY MEDICINE CLINIC | Facility: CLINIC | Age: 65
End: 2025-01-07
Payer: MEDICARE

## 2025-01-07 VITALS
DIASTOLIC BLOOD PRESSURE: 70 MMHG | HEIGHT: 64 IN | BODY MASS INDEX: 26.46 KG/M2 | HEART RATE: 78 BPM | SYSTOLIC BLOOD PRESSURE: 128 MMHG | OXYGEN SATURATION: 99 % | WEIGHT: 155 LBS | TEMPERATURE: 97.4 F

## 2025-01-07 DIAGNOSIS — H53.9 VISUAL DISTURBANCE: Primary | ICD-10-CM

## 2025-01-07 PROCEDURE — 1159F MED LIST DOCD IN RCRD: CPT | Performed by: FAMILY MEDICINE

## 2025-01-07 PROCEDURE — 1160F RVW MEDS BY RX/DR IN RCRD: CPT | Performed by: FAMILY MEDICINE

## 2025-01-07 PROCEDURE — 1126F AMNT PAIN NOTED NONE PRSNT: CPT | Performed by: FAMILY MEDICINE

## 2025-01-07 PROCEDURE — 99213 OFFICE O/P EST LOW 20 MIN: CPT | Performed by: FAMILY MEDICINE

## 2025-01-07 NOTE — PROGRESS NOTES
Subjective   Bryant Delgado is a 64 y.o. male.     History of Present Illness  64-year-old male with visual disturbance right eye couple times over the last week-blind spot that corrected itself  Loss of Vision  Pertinent negative symptoms include no headaches.       The following portions of the patient's history were reviewed and updated as appropriate: allergies, current medications, past family history, past medical history, past social history, past surgical history, and problem list.    Review of Systems   Eyes:  Positive for visual disturbance. Negative for discharge, redness and itching.   Genitourinary:         Testicular pain   Neurological:  Negative for dizziness and headaches.       Objective   Physical Exam  Vitals and nursing note reviewed.   Constitutional:       Appearance: Normal appearance.   HENT:      Mouth/Throat:      Mouth: Mucous membranes are moist.   Eyes:      Extraocular Movements: Extraocular movements intact.      Pupils: Pupils are equal, round, and reactive to light.   Neck:      Vascular: No carotid bruit.   Cardiovascular:      Rate and Rhythm: Normal rate and regular rhythm.   Pulmonary:      Effort: Pulmonary effort is normal.      Breath sounds: Normal breath sounds.   Musculoskeletal:      Right lower leg: No edema.      Left lower leg: No edema.   Skin:     General: Skin is warm and dry.   Neurological:      General: No focal deficit present.      Mental Status: He is alert and oriented to person, place, and time.   Psychiatric:         Mood and Affect: Mood normal.         Behavior: Behavior normal.         Thought Content: Thought content normal.         Judgment: Judgment normal.         Assessment & Plan   Diagnoses and all orders for this visit:    1. Visual disturbance (Primary)         Urgent referral to optometrist-rule out retinal detachment

## 2025-04-22 ENCOUNTER — PRE-ADMISSION TESTING (OUTPATIENT)
Dept: PREADMISSION TESTING | Facility: HOSPITAL | Age: 65
End: 2025-04-22
Payer: MEDICARE

## 2025-04-22 ENCOUNTER — OFFICE VISIT (OUTPATIENT)
Dept: VASCULAR SURGERY | Facility: CLINIC | Age: 65
End: 2025-04-22
Payer: MEDICARE

## 2025-04-22 ENCOUNTER — ANESTHESIA EVENT (OUTPATIENT)
Dept: PERIOP | Facility: HOSPITAL | Age: 65
End: 2025-04-22
Payer: MEDICARE

## 2025-04-22 VITALS
BODY MASS INDEX: 25.95 KG/M2 | DIASTOLIC BLOOD PRESSURE: 64 MMHG | HEIGHT: 64 IN | HEART RATE: 56 BPM | SYSTOLIC BLOOD PRESSURE: 110 MMHG | WEIGHT: 152 LBS | OXYGEN SATURATION: 97 %

## 2025-04-22 VITALS
DIASTOLIC BLOOD PRESSURE: 64 MMHG | OXYGEN SATURATION: 100 % | BODY MASS INDEX: 26.05 KG/M2 | WEIGHT: 152.56 LBS | HEIGHT: 64 IN | RESPIRATION RATE: 18 BRPM | HEART RATE: 51 BPM | SYSTOLIC BLOOD PRESSURE: 118 MMHG

## 2025-04-22 DIAGNOSIS — G45.3 AMAUROSIS FUGAX OF RIGHT EYE: ICD-10-CM

## 2025-04-22 DIAGNOSIS — Z01.818 PREOP TESTING: ICD-10-CM

## 2025-04-22 DIAGNOSIS — I65.23 BILATERAL CAROTID ARTERY STENOSIS: ICD-10-CM

## 2025-04-22 DIAGNOSIS — G45.3 AMAUROSIS FUGAX OF RIGHT EYE: Primary | ICD-10-CM

## 2025-04-22 LAB
ANION GAP SERPL CALCULATED.3IONS-SCNC: 9 MMOL/L (ref 5–15)
APTT PPP: 27.4 SECONDS (ref 24.5–36)
BASOPHILS # BLD AUTO: 0.02 10*3/MM3 (ref 0–0.2)
BASOPHILS NFR BLD AUTO: 0.3 % (ref 0–1.5)
BUN SERPL-MCNC: 8 MG/DL (ref 8–23)
BUN/CREAT SERPL: 9.2 (ref 7–25)
CALCIUM SPEC-SCNC: 9.3 MG/DL (ref 8.6–10.5)
CHLORIDE SERPL-SCNC: 105 MMOL/L (ref 98–107)
CO2 SERPL-SCNC: 27 MMOL/L (ref 22–29)
CREAT SERPL-MCNC: 0.87 MG/DL (ref 0.76–1.27)
DEPRECATED RDW RBC AUTO: 42.7 FL (ref 37–54)
EGFRCR SERPLBLD CKD-EPI 2021: 95.8 ML/MIN/1.73
EOSINOPHIL # BLD AUTO: 0.07 10*3/MM3 (ref 0–0.4)
EOSINOPHIL NFR BLD AUTO: 1.2 % (ref 0.3–6.2)
ERYTHROCYTE [DISTWIDTH] IN BLOOD BY AUTOMATED COUNT: 12.5 % (ref 12.3–15.4)
GLUCOSE SERPL-MCNC: 92 MG/DL (ref 65–99)
HCT VFR BLD AUTO: 36 % (ref 37.5–51)
HGB BLD-MCNC: 12 G/DL (ref 13–17.7)
IMM GRANULOCYTES # BLD AUTO: 0.03 10*3/MM3 (ref 0–0.05)
IMM GRANULOCYTES NFR BLD AUTO: 0.5 % (ref 0–0.5)
INR PPP: 1 (ref 0.91–1.09)
LYMPHOCYTES # BLD AUTO: 1.4 10*3/MM3 (ref 0.7–3.1)
LYMPHOCYTES NFR BLD AUTO: 24.5 % (ref 19.6–45.3)
MCH RBC QN AUTO: 31.3 PG (ref 26.6–33)
MCHC RBC AUTO-ENTMCNC: 33.3 G/DL (ref 31.5–35.7)
MCV RBC AUTO: 93.8 FL (ref 79–97)
MONOCYTES # BLD AUTO: 0.51 10*3/MM3 (ref 0.1–0.9)
MONOCYTES NFR BLD AUTO: 8.9 % (ref 5–12)
NEUTROPHILS NFR BLD AUTO: 3.69 10*3/MM3 (ref 1.7–7)
NEUTROPHILS NFR BLD AUTO: 64.6 % (ref 42.7–76)
NRBC BLD AUTO-RTO: 0 /100 WBC (ref 0–0.2)
PLATELET # BLD AUTO: 293 10*3/MM3 (ref 140–450)
PMV BLD AUTO: 10 FL (ref 6–12)
POTASSIUM SERPL-SCNC: 4.2 MMOL/L (ref 3.5–5.2)
PROTHROMBIN TIME: 13.7 SECONDS (ref 11.8–14.8)
RBC # BLD AUTO: 3.84 10*6/MM3 (ref 4.14–5.8)
SODIUM SERPL-SCNC: 141 MMOL/L (ref 136–145)
WBC NRBC COR # BLD AUTO: 5.72 10*3/MM3 (ref 3.4–10.8)

## 2025-04-22 PROCEDURE — 85610 PROTHROMBIN TIME: CPT

## 2025-04-22 PROCEDURE — 85730 THROMBOPLASTIN TIME PARTIAL: CPT

## 2025-04-22 PROCEDURE — 80048 BASIC METABOLIC PNL TOTAL CA: CPT

## 2025-04-22 PROCEDURE — 99204 OFFICE O/P NEW MOD 45 MIN: CPT | Performed by: SURGERY

## 2025-04-22 PROCEDURE — 36415 COLL VENOUS BLD VENIPUNCTURE: CPT

## 2025-04-22 PROCEDURE — 85025 COMPLETE CBC W/AUTO DIFF WBC: CPT

## 2025-04-22 PROCEDURE — 1159F MED LIST DOCD IN RCRD: CPT | Performed by: SURGERY

## 2025-04-22 PROCEDURE — 1160F RVW MEDS BY RX/DR IN RCRD: CPT | Performed by: SURGERY

## 2025-04-22 RX ORDER — ASPIRIN 81 MG/1
81 TABLET ORAL DAILY
Start: 2025-04-22

## 2025-04-22 NOTE — LETTER
"April 22, 2025     Tomas García MD  [address] [facility] [address]    Patient: Bryant Delgado   YOB: 1960   Date of Visit: 4/22/2025     Dear Tomas García MD:       Thank you for referring Bryant Delgado to me for evaluation. Below are the relevant portions of my assessment and plan of care.    If you have questions, please do not hesitate to call me. I look forward to following Bryant along with you.         Sincerely,        Jean Concepcion DO        CC: No Recipients    Jean Concepcion DO  04/22/25 1535  Sign when Signing Visit  04/22/2025      Tomas García MD  [address] [id number]    Bryant Delgado  1960    Chief Complaint   Patient presents with   • Establish Care     Patient states that he was referred here for carotid biopsy due to loss of vision in right eye starting since OCT 2024   States that when on steroids he did not have as many episodes of vision loss   Is a non smoker but uses chewing tobacco        Dear Tomas García MD    HPI  I had the pleasure of seeing your patient Bryant Delgado in the office today.  Thank you kindly for this consultation.  As you recall, Bryant Delgado is a 65 y.o.  male who you are currently following for blurred vision to right eye.  He had a \"black curtain\" over vision with complete loss and other episodes have been partial.  He is maintained on Pravachol.  He had elevated CRP 11.32.  ESR was normal.  Dr. García's note does not state anything about stroke to eye.        Past Medical History:   Diagnosis Date   • Hyperlipidemia    • Hypertension    • Neuropathy    • RMSF (Bryce Mountain spotted fever) 08/2023   • Sensorineural hearing loss (SNHL) of both ears 06/03/2020       Past Surgical History:   Procedure Laterality Date   • CERVICAL FUSION     • COLONOSCOPY  01/2023   • LUMBAR LAMINECTOMY         History reviewed. No pertinent family history.    Social History     Socioeconomic " "History   • Marital status:    Tobacco Use   • Smoking status: Never   • Smokeless tobacco: Current     Types: Snuff   Vaping Use   • Vaping status: Never Used   Substance and Sexual Activity   • Alcohol use: No   • Drug use: No   • Sexual activity: Defer       Allergies   Allergen Reactions   • Keflex [Cephalexin] Itching and Swelling         Current Outpatient Medications:   •  Cholecalciferol (VITAMIN D3) 125 MCG (5000 UT) capsule capsule, Take 1 capsule by mouth Daily., Disp: , Rfl:   •  Cyanocobalamin (VITAMIN B 12) 500 MCG tablet, Take 2 tablets by mouth Daily., Disp: , Rfl:   •  gabapentin (NEURONTIN) 600 MG tablet, 3 (Three) Times a Day., Disp: , Rfl: 2  •  lisinopril (PRINIVIL,ZESTRIL) 20 MG tablet, TAKE ONE TABLET BY MOUTH TWO TIMES A DAY, Disp: 180 tablet, Rfl: 3  •  metoprolol succinate XL (TOPROL-XL) 50 MG 24 hr tablet, Take 0.5 tablets by mouth 2 (Two) Times a Day., Disp: 90 tablet, Rfl: 3  •  oxyCODONE-acetaminophen (PERCOCET) 7.5-325 MG per tablet, Take 1 tablet by mouth 4 (Four) Times a Day As Needed for Severe Pain., Disp: , Rfl:   •  pravastatin (PRAVACHOL) 40 MG tablet, Take 1 tablet by mouth Every Night., Disp: 90 tablet, Rfl: 2  •  aspirin 81 MG EC tablet, Take 1 tablet by mouth Daily., Disp: , Rfl:   •  meloxicam (MOBIC) 15 MG tablet, Take 1 tablet by mouth Daily As Needed. (Patient not taking: Reported on 4/22/2025), Disp: , Rfl:     Review of Systems   Constitutional: Negative.    HENT: Negative.     Eyes:  Positive for visual disturbance.   Respiratory: Negative.     Cardiovascular: Negative.    Gastrointestinal: Negative.    Endocrine: Negative.    Genitourinary: Negative.    Musculoskeletal: Negative.    Skin: Negative.    Allergic/Immunologic: Negative.    Neurological: Negative.    Hematological: Negative.    Psychiatric/Behavioral: Negative.     All other systems reviewed and are negative.    /64   Pulse 56   Ht 162.6 cm (64\")   Wt 68.9 kg (152 lb)   SpO2 97%   BMI " 26.09 kg/m²     Physical Exam  Vitals and nursing note reviewed.   Constitutional:       Appearance: Normal appearance. He is well-developed, well-groomed and overweight.   HENT:      Head: Normocephalic and atraumatic.   Eyes:      General: No scleral icterus.     Pupils: Pupils are equal, round, and reactive to light.   Neck:      Thyroid: No thyromegaly.      Vascular: No carotid bruit or JVD.   Cardiovascular:      Rate and Rhythm: Normal rate and regular rhythm.      Pulses:           Carotid pulses are 2+ on the right side and 2+ on the left side.       Femoral pulses are 2+ on the right side and 2+ on the left side.       Popliteal pulses are 2+ on the right side and 2+ on the left side.        Dorsalis pedis pulses are 2+ on the right side and 2+ on the left side.        Posterior tibial pulses are 2+ on the right side and 2+ on the left side.      Heart sounds: Normal heart sounds.   Pulmonary:      Effort: Pulmonary effort is normal.      Breath sounds: Normal breath sounds.   Abdominal:      General: Bowel sounds are normal. There is no distension or abdominal bruit.      Palpations: Abdomen is soft. There is no mass.      Tenderness: There is no abdominal tenderness.   Musculoskeletal:         General: Normal range of motion.      Cervical back: Neck supple.   Lymphadenopathy:      Cervical: No cervical adenopathy.   Skin:     General: Skin is warm and dry.   Neurological:      Mental Status: He is alert and oriented to person, place, and time.      Cranial Nerves: No cranial nerve deficit.      Sensory: No sensory deficit.   Psychiatric:         Mood and Affect: Mood normal.         Behavior: Behavior normal. Behavior is cooperative.         Thought Content: Thought content normal.         Judgment: Judgment normal.         Patient Active Problem List   Diagnosis   • Sensorineural hearing loss (SNHL) of both ears   • Tinnitus of both ears   • Auditory hallucinations   • RMSF (Bryce Mountain spotted  fever)   • Amaurosis fugax of right eye   • Bilateral carotid artery stenosis   • Preop testing        Diagnosis Plan   1. Amaurosis fugax of right eye  CT Angiogram Neck    CBC and Differential    Basic metabolic panel    APTT    Protime-INR    Case Request    clindamycin (CLEOCIN) 900 mg in dextrose (D5W) 5 % 100 mL IVPB      2. Bilateral carotid artery stenosis  CT Angiogram Neck    CBC and Differential    Basic metabolic panel    APTT    Protime-INR    Case Request    clindamycin (CLEOCIN) 900 mg in dextrose (D5W) 5 % 100 mL IVPB      3. Preop testing  CBC and Differential    Basic metabolic panel    APTT    Protime-INR    Case Request    clindamycin (CLEOCIN) 900 mg in dextrose (D5W) 5 % 100 mL IVPB          Plan: After thoroughly evaluating Bryant Delgado, I believe the best course of action is to proceed with a right temporal artery biopsy.  Risks/benefits were explained which include but are not limited to bleeding, infection, and failure of incision to heal.  Patient understands all the risks and wishes for me to proceed.   He did have elevated CRP 11.32 with amaurosis fugax of the right eye.  I would also like his to have a CTA of the neck to further evaluate his carotid system.  I will try to order this for this week.  He was treated with steroids but has not been on for almost a month.  I will have him begin taking aspirin 81 mg daily.  The patient is to continue taking their medications as previously discussed.   This was all discussed in full with complete understanding.  Thank you for allowing me to participate in the care of your patient.  Please do not hesitate to call with any questions or concerns.  We will keep you aware of any further encounters with Bryant Delgado.        Sincerely yours,         DO Kay Esteves, Bennett Caldwell MD

## 2025-04-22 NOTE — H&P
"4/22/2025          Tomsa García MD  [address] [place] [address]     Bryant Delgado  1960          Chief Complaint   Patient presents with    Establish Care       Patient states that he was referred here for carotid biopsy due to loss of vision in right eye starting since OCT 2024   States that when on steroids he did not have as many episodes of vision loss   Is a non smoker but uses chewing tobacco          Dear Tomas García MD     HPI  I had the pleasure of seeing your patient Bryant Delgado in the office today.  Thank you kindly for this consultation.  As you recall, Bryant Delgado is a 65 y.o.  male who you are currently following for blurred vision to right eye.  He had a \"black curtain\" over vision with complete loss and other episodes have been partial.  He is maintained on Pravachol.  He had elevated CRP 11.32.  ESR was normal.  Dr. García's note does not state anything about stroke to eye.          Medical History        Past Medical History:   Diagnosis Date    Hyperlipidemia      Hypertension      Neuropathy      RMSF (Bryce Mountain spotted fever) 08/2023    Sensorineural hearing loss (SNHL) of both ears 06/03/2020            Surgical History         Past Surgical History:   Procedure Laterality Date    CERVICAL FUSION        COLONOSCOPY   01/2023    LUMBAR LAMINECTOMY                History reviewed. No pertinent family history.     Social History   Social History            Socioeconomic History    Marital status:    Tobacco Use    Smoking status: Never    Smokeless tobacco: Current       Types: Snuff   Vaping Use    Vaping status: Never Used   Substance and Sexual Activity    Alcohol use: No    Drug use: No    Sexual activity: Defer            Allergies        Allergies   Allergen Reactions    Keflex [Cephalexin] Itching and Swelling              Current Medications      Current Outpatient Medications:     Cholecalciferol (VITAMIN D3) 125 MCG (5000 UT) " "capsule capsule, Take 1 capsule by mouth Daily., Disp: , Rfl:     Cyanocobalamin (VITAMIN B 12) 500 MCG tablet, Take 2 tablets by mouth Daily., Disp: , Rfl:     gabapentin (NEURONTIN) 600 MG tablet, 3 (Three) Times a Day., Disp: , Rfl: 2    lisinopril (PRINIVIL,ZESTRIL) 20 MG tablet, TAKE ONE TABLET BY MOUTH TWO TIMES A DAY, Disp: 180 tablet, Rfl: 3    metoprolol succinate XL (TOPROL-XL) 50 MG 24 hr tablet, Take 0.5 tablets by mouth 2 (Two) Times a Day., Disp: 90 tablet, Rfl: 3    oxyCODONE-acetaminophen (PERCOCET) 7.5-325 MG per tablet, Take 1 tablet by mouth 4 (Four) Times a Day As Needed for Severe Pain., Disp: , Rfl:     pravastatin (PRAVACHOL) 40 MG tablet, Take 1 tablet by mouth Every Night., Disp: 90 tablet, Rfl: 2    aspirin 81 MG EC tablet, Take 1 tablet by mouth Daily., Disp: , Rfl:     meloxicam (MOBIC) 15 MG tablet, Take 1 tablet by mouth Daily As Needed. (Patient not taking: Reported on 4/22/2025), Disp: , Rfl:         Review of Systems   Constitutional: Negative.    HENT: Negative.     Eyes:  Positive for visual disturbance.   Respiratory: Negative.     Cardiovascular: Negative.    Gastrointestinal: Negative.    Endocrine: Negative.    Genitourinary: Negative.    Musculoskeletal: Negative.    Skin: Negative.    Allergic/Immunologic: Negative.    Neurological: Negative.    Hematological: Negative.    Psychiatric/Behavioral: Negative.     All other systems reviewed and are negative.     /64   Pulse 56   Ht 162.6 cm (64\")   Wt 68.9 kg (152 lb)   SpO2 97%   BMI 26.09 kg/m²      Physical Exam  Vitals and nursing note reviewed.   Constitutional:       Appearance: Normal appearance. He is well-developed, well-groomed and overweight.   HENT:      Head: Normocephalic and atraumatic.   Eyes:      General: No scleral icterus.     Pupils: Pupils are equal, round, and reactive to light.   Neck:      Thyroid: No thyromegaly.      Vascular: No carotid bruit or JVD.   Cardiovascular:      Rate and Rhythm: " Normal rate and regular rhythm.      Pulses:           Carotid pulses are 2+ on the right side and 2+ on the left side.       Femoral pulses are 2+ on the right side and 2+ on the left side.       Popliteal pulses are 2+ on the right side and 2+ on the left side.        Dorsalis pedis pulses are 2+ on the right side and 2+ on the left side.        Posterior tibial pulses are 2+ on the right side and 2+ on the left side.      Heart sounds: Normal heart sounds.   Pulmonary:      Effort: Pulmonary effort is normal.      Breath sounds: Normal breath sounds.   Abdominal:      General: Bowel sounds are normal. There is no distension or abdominal bruit.      Palpations: Abdomen is soft. There is no mass.      Tenderness: There is no abdominal tenderness.   Musculoskeletal:         General: Normal range of motion.      Cervical back: Neck supple.   Lymphadenopathy:      Cervical: No cervical adenopathy.   Skin:     General: Skin is warm and dry.   Neurological:      Mental Status: He is alert and oriented to person, place, and time.      Cranial Nerves: No cranial nerve deficit.      Sensory: No sensory deficit.   Psychiatric:         Mood and Affect: Mood normal.         Behavior: Behavior normal. Behavior is cooperative.         Thought Content: Thought content normal.         Judgment: Judgment normal.            Problem List       Patient Active Problem List   Diagnosis    Sensorineural hearing loss (SNHL) of both ears    Tinnitus of both ears    Auditory hallucinations    RMSF (Bryce Mountain spotted fever)    Amaurosis fugax of right eye    Bilateral carotid artery stenosis    Preop testing              Diagnosis Plan   1. Amaurosis fugax of right eye  CT Angiogram Neck     CBC and Differential     Basic metabolic panel     APTT     Protime-INR     Case Request     clindamycin (CLEOCIN) 900 mg in dextrose (D5W) 5 % 100 mL IVPB       2. Bilateral carotid artery stenosis  CT Angiogram Neck     CBC and Differential      Basic metabolic panel     APTT     Protime-INR     Case Request     clindamycin (CLEOCIN) 900 mg in dextrose (D5W) 5 % 100 mL IVPB       3. Preop testing  CBC and Differential     Basic metabolic panel     APTT     Protime-INR     Case Request     clindamycin (CLEOCIN) 900 mg in dextrose (D5W) 5 % 100 mL IVPB             Plan: After thoroughly evaluating Bryant Delgado, I believe the best course of action is to proceed with a right temporal artery biopsy.  Risks/benefits were explained which include but are not limited to bleeding, infection, and failure of incision to heal.  Patient understands all the risks and wishes for me to proceed.   He did have elevated CRP 11.32 with amaurosis fugax of the right eye.  I would also like his to have a CTA of the neck to further evaluate his carotid system.  I will try to order this for this week.  He was treated with steroids but has not been on for almost a month.  I will have him begin taking aspirin 81 mg daily.  The patient is to continue taking their medications as previously discussed.   This was all discussed in full with complete understanding.  Thank you for allowing me to participate in the care of your patient.  Please do not hesitate to call with any questions or concerns.  We will keep you aware of any further encounters with Bryant Delgado.         Sincerely yours,           DO Kay Esteves, Bennett Caldwell MD

## 2025-04-22 NOTE — DISCHARGE INSTRUCTIONS

## 2025-04-22 NOTE — PROGRESS NOTES
"04/22/2025      Tomas García MD  [address] [place] [address]    Bryant Delgado  1960    Chief Complaint   Patient presents with    Establish Care     Patient states that he was referred here for carotid biopsy due to loss of vision in right eye starting since OCT 2024   States that when on steroids he did not have as many episodes of vision loss   Is a non smoker but uses chewing tobacco        Dear Tomas García MD    HPI  I had the pleasure of seeing your patient Bryant Delgado in the office today.  Thank you kindly for this consultation.  As you recall, Bryant Delgado is a 65 y.o.  male who you are currently following for blurred vision to right eye.  He had a \"black curtain\" over vision with complete loss and other episodes have been partial.  He is maintained on Pravachol.  He had elevated CRP 11.32.  ESR was normal.  Dr. García's note does not state anything about stroke to eye.        Past Medical History:   Diagnosis Date    Hyperlipidemia     Hypertension     Neuropathy     RMSF (Bryce Mountain spotted fever) 08/2023    Sensorineural hearing loss (SNHL) of both ears 06/03/2020       Past Surgical History:   Procedure Laterality Date    CERVICAL FUSION      COLONOSCOPY  01/2023    LUMBAR LAMINECTOMY         History reviewed. No pertinent family history.    Social History     Socioeconomic History    Marital status:    Tobacco Use    Smoking status: Never    Smokeless tobacco: Current     Types: Snuff   Vaping Use    Vaping status: Never Used   Substance and Sexual Activity    Alcohol use: No    Drug use: No    Sexual activity: Defer       Allergies   Allergen Reactions    Keflex [Cephalexin] Itching and Swelling         Current Outpatient Medications:     Cholecalciferol (VITAMIN D3) 125 MCG (5000 UT) capsule capsule, Take 1 capsule by mouth Daily., Disp: , Rfl:     Cyanocobalamin (VITAMIN B 12) 500 MCG tablet, Take 2 tablets by mouth Daily., Disp: , Rfl:     " "gabapentin (NEURONTIN) 600 MG tablet, 3 (Three) Times a Day., Disp: , Rfl: 2    lisinopril (PRINIVIL,ZESTRIL) 20 MG tablet, TAKE ONE TABLET BY MOUTH TWO TIMES A DAY, Disp: 180 tablet, Rfl: 3    metoprolol succinate XL (TOPROL-XL) 50 MG 24 hr tablet, Take 0.5 tablets by mouth 2 (Two) Times a Day., Disp: 90 tablet, Rfl: 3    oxyCODONE-acetaminophen (PERCOCET) 7.5-325 MG per tablet, Take 1 tablet by mouth 4 (Four) Times a Day As Needed for Severe Pain., Disp: , Rfl:     pravastatin (PRAVACHOL) 40 MG tablet, Take 1 tablet by mouth Every Night., Disp: 90 tablet, Rfl: 2    aspirin 81 MG EC tablet, Take 1 tablet by mouth Daily., Disp: , Rfl:     meloxicam (MOBIC) 15 MG tablet, Take 1 tablet by mouth Daily As Needed. (Patient not taking: Reported on 4/22/2025), Disp: , Rfl:     Review of Systems   Constitutional: Negative.    HENT: Negative.     Eyes:  Positive for visual disturbance.   Respiratory: Negative.     Cardiovascular: Negative.    Gastrointestinal: Negative.    Endocrine: Negative.    Genitourinary: Negative.    Musculoskeletal: Negative.    Skin: Negative.    Allergic/Immunologic: Negative.    Neurological: Negative.    Hematological: Negative.    Psychiatric/Behavioral: Negative.     All other systems reviewed and are negative.    /64   Pulse 56   Ht 162.6 cm (64\")   Wt 68.9 kg (152 lb)   SpO2 97%   BMI 26.09 kg/m²     Physical Exam  Vitals and nursing note reviewed.   Constitutional:       Appearance: Normal appearance. He is well-developed, well-groomed and overweight.   HENT:      Head: Normocephalic and atraumatic.   Eyes:      General: No scleral icterus.     Pupils: Pupils are equal, round, and reactive to light.   Neck:      Thyroid: No thyromegaly.      Vascular: No carotid bruit or JVD.   Cardiovascular:      Rate and Rhythm: Normal rate and regular rhythm.      Pulses:           Carotid pulses are 2+ on the right side and 2+ on the left side.       Femoral pulses are 2+ on the right side " and 2+ on the left side.       Popliteal pulses are 2+ on the right side and 2+ on the left side.        Dorsalis pedis pulses are 2+ on the right side and 2+ on the left side.        Posterior tibial pulses are 2+ on the right side and 2+ on the left side.      Heart sounds: Normal heart sounds.   Pulmonary:      Effort: Pulmonary effort is normal.      Breath sounds: Normal breath sounds.   Abdominal:      General: Bowel sounds are normal. There is no distension or abdominal bruit.      Palpations: Abdomen is soft. There is no mass.      Tenderness: There is no abdominal tenderness.   Musculoskeletal:         General: Normal range of motion.      Cervical back: Neck supple.   Lymphadenopathy:      Cervical: No cervical adenopathy.   Skin:     General: Skin is warm and dry.   Neurological:      Mental Status: He is alert and oriented to person, place, and time.      Cranial Nerves: No cranial nerve deficit.      Sensory: No sensory deficit.   Psychiatric:         Mood and Affect: Mood normal.         Behavior: Behavior normal. Behavior is cooperative.         Thought Content: Thought content normal.         Judgment: Judgment normal.         Patient Active Problem List   Diagnosis    Sensorineural hearing loss (SNHL) of both ears    Tinnitus of both ears    Auditory hallucinations    RMSF (Bryce Mountain spotted fever)    Amaurosis fugax of right eye    Bilateral carotid artery stenosis    Preop testing        Diagnosis Plan   1. Amaurosis fugax of right eye  CT Angiogram Neck    CBC and Differential    Basic metabolic panel    APTT    Protime-INR    Case Request    clindamycin (CLEOCIN) 900 mg in dextrose (D5W) 5 % 100 mL IVPB      2. Bilateral carotid artery stenosis  CT Angiogram Neck    CBC and Differential    Basic metabolic panel    APTT    Protime-INR    Case Request    clindamycin (CLEOCIN) 900 mg in dextrose (D5W) 5 % 100 mL IVPB      3. Preop testing  CBC and Differential    Basic metabolic panel    APTT     Protime-INR    Case Request    clindamycin (CLEOCIN) 900 mg in dextrose (D5W) 5 % 100 mL IVPB          Plan: After thoroughly evaluating Bryant Delgado, I believe the best course of action is to proceed with a right temporal artery biopsy.  Risks/benefits were explained which include but are not limited to bleeding, infection, and failure of incision to heal.  Patient understands all the risks and wishes for me to proceed.   He did have elevated CRP 11.32 with amaurosis fugax of the right eye.  I would also like his to have a CTA of the neck to further evaluate his carotid system.  I will try to order this for this week.  He was treated with steroids but has not been on for almost a month.  I will have him begin taking aspirin 81 mg daily.  The patient is to continue taking their medications as previously discussed.   This was all discussed in full with complete understanding.  Thank you for allowing me to participate in the care of your patient.  Please do not hesitate to call with any questions or concerns.  We will keep you aware of any further encounters with Bryant Delgado.        Sincerely yours,         DO Kay Esteves Lathan Edwards, MD

## 2025-04-23 ENCOUNTER — PATIENT ROUNDING (BHMG ONLY) (OUTPATIENT)
Dept: VASCULAR SURGERY | Facility: CLINIC | Age: 65
End: 2025-04-23
Payer: MEDICARE

## 2025-04-23 ENCOUNTER — TELEPHONE (OUTPATIENT)
Dept: VASCULAR SURGERY | Facility: CLINIC | Age: 65
End: 2025-04-23
Payer: MEDICARE

## 2025-04-23 NOTE — PROGRESS NOTES
April 23, 2025    Hello, may I speak with Bryant Delgado?    My name is Laura Hamilton     I am  with [facility]  [address], [address]  [facility] [address] [phone].    Before we get started may I verify your date of birth? 1960    I am calling to officially welcome you to our practice and ask about your recent visit. Is this a good time to talk? yes    Tell me about your visit with us. What things went well?  Everything went fine        We're always looking for ways to make our patients' experiences even better. Do you have recommendations on ways we may improve?  no    Overall were you satisfied with your first visit to our practice? yes       I appreciate you taking the time to speak with me today. Is there anything else I can do for you? no      Thank you, and have a great day.

## 2025-04-23 NOTE — TELEPHONE ENCOUNTER
Spoke with patient wife she confirmed surgery date and time of 04/24/2025 with an arrival time of 5:00 am. Patient is to be NPO after midnight and follow pre-work instructions for medications. Patient wife verbalized understanding.

## 2025-04-24 ENCOUNTER — ANESTHESIA (OUTPATIENT)
Dept: PERIOP | Facility: HOSPITAL | Age: 65
End: 2025-04-24
Payer: MEDICARE

## 2025-04-24 ENCOUNTER — HOSPITAL ENCOUNTER (OUTPATIENT)
Facility: HOSPITAL | Age: 65
Setting detail: HOSPITAL OUTPATIENT SURGERY
Discharge: HOME | End: 2025-04-24
Attending: SURGERY | Admitting: SURGERY
Payer: MEDICARE

## 2025-04-24 VITALS
RESPIRATION RATE: 16 BRPM | OXYGEN SATURATION: 98 % | HEART RATE: 72 BPM | TEMPERATURE: 97.9 F | DIASTOLIC BLOOD PRESSURE: 55 MMHG | SYSTOLIC BLOOD PRESSURE: 112 MMHG

## 2025-04-24 DIAGNOSIS — Z01.818 PREOP TESTING: ICD-10-CM

## 2025-04-24 DIAGNOSIS — I65.23 BILATERAL CAROTID ARTERY STENOSIS: ICD-10-CM

## 2025-04-24 DIAGNOSIS — G45.3 AMAUROSIS FUGAX OF RIGHT EYE: ICD-10-CM

## 2025-04-24 PROCEDURE — 37609 LIGATION/BX TEMPORAL ARTERY: CPT | Performed by: SURGERY

## 2025-04-24 PROCEDURE — 25010000002 DEXAMETHASONE PER 1 MG: Performed by: NURSE ANESTHETIST, CERTIFIED REGISTERED

## 2025-04-24 PROCEDURE — 25010000002 LIDOCAINE PF 2% 2 % SOLUTION: Performed by: NURSE ANESTHETIST, CERTIFIED REGISTERED

## 2025-04-24 PROCEDURE — 25810000003 LACTATED RINGERS PER 1000 ML: Performed by: SURGERY

## 2025-04-24 PROCEDURE — 25010000002 PROPOFOL 200 MG/20ML EMULSION: Performed by: NURSE ANESTHETIST, CERTIFIED REGISTERED

## 2025-04-24 PROCEDURE — 25010000002 CLINDAMYCIN 900 MG/50ML SOLUTION: Performed by: SURGERY

## 2025-04-24 PROCEDURE — 88305 TISSUE EXAM BY PATHOLOGIST: CPT | Performed by: SURGERY

## 2025-04-24 PROCEDURE — 25010000002 BUPIVACAINE (PF) 0.5 % SOLUTION: Performed by: SURGERY

## 2025-04-24 PROCEDURE — 25010000002 ONDANSETRON PER 1 MG: Performed by: NURSE ANESTHETIST, CERTIFIED REGISTERED

## 2025-04-24 RX ORDER — LIDOCAINE HYDROCHLORIDE 10 MG/ML
0.5 INJECTION, SOLUTION EPIDURAL; INFILTRATION; INTRACAUDAL; PERINEURAL ONCE AS NEEDED
Status: DISCONTINUED | OUTPATIENT
Start: 2025-04-24 | End: 2025-04-24 | Stop reason: HOSPADM

## 2025-04-24 RX ORDER — DEXTROSE MONOHYDRATE 25 G/50ML
12.5 INJECTION, SOLUTION INTRAVENOUS AS NEEDED
Status: DISCONTINUED | OUTPATIENT
Start: 2025-04-24 | End: 2025-04-24 | Stop reason: HOSPADM

## 2025-04-24 RX ORDER — HYDROCODONE BITARTRATE AND ACETAMINOPHEN 5; 325 MG/1; MG/1
1 TABLET ORAL EVERY 6 HOURS PRN
Qty: 10 TABLET | Refills: 0 | Status: SHIPPED | OUTPATIENT
Start: 2025-04-24 | End: 2025-05-01

## 2025-04-24 RX ORDER — HYDROCODONE BITARTRATE AND ACETAMINOPHEN 5; 325 MG/1; MG/1
1 TABLET ORAL EVERY 4 HOURS PRN
Status: DISCONTINUED | OUTPATIENT
Start: 2025-04-24 | End: 2025-04-24 | Stop reason: HOSPADM

## 2025-04-24 RX ORDER — SODIUM CHLORIDE 0.9 % (FLUSH) 0.9 %
10 SYRINGE (ML) INJECTION AS NEEDED
Status: DISCONTINUED | OUTPATIENT
Start: 2025-04-24 | End: 2025-04-24 | Stop reason: HOSPADM

## 2025-04-24 RX ORDER — HYDROCODONE BITARTRATE AND ACETAMINOPHEN 5; 325 MG/1; MG/1
1 TABLET ORAL ONCE AS NEEDED
Refills: 0 | Status: DISCONTINUED | OUTPATIENT
Start: 2025-04-24 | End: 2025-04-24 | Stop reason: HOSPADM

## 2025-04-24 RX ORDER — FENTANYL CITRATE 50 UG/ML
25 INJECTION, SOLUTION INTRAMUSCULAR; INTRAVENOUS
Status: DISCONTINUED | OUTPATIENT
Start: 2025-04-24 | End: 2025-04-24 | Stop reason: HOSPADM

## 2025-04-24 RX ORDER — PROPOFOL 10 MG/ML
INJECTION, EMULSION INTRAVENOUS AS NEEDED
Status: DISCONTINUED | OUTPATIENT
Start: 2025-04-24 | End: 2025-04-24 | Stop reason: SURG

## 2025-04-24 RX ORDER — ONDANSETRON 2 MG/ML
INJECTION INTRAMUSCULAR; INTRAVENOUS AS NEEDED
Status: DISCONTINUED | OUTPATIENT
Start: 2025-04-24 | End: 2025-04-24 | Stop reason: SURG

## 2025-04-24 RX ORDER — MIDAZOLAM HYDROCHLORIDE 2 MG/2ML
1 INJECTION, SOLUTION INTRAMUSCULAR; INTRAVENOUS
Status: DISCONTINUED | OUTPATIENT
Start: 2025-04-24 | End: 2025-04-24 | Stop reason: HOSPADM

## 2025-04-24 RX ORDER — HYDROCODONE BITARTRATE AND ACETAMINOPHEN 10; 325 MG/1; MG/1
1 TABLET ORAL EVERY 4 HOURS PRN
Status: DISCONTINUED | OUTPATIENT
Start: 2025-04-24 | End: 2025-04-24 | Stop reason: HOSPADM

## 2025-04-24 RX ORDER — MIDAZOLAM HYDROCHLORIDE 2 MG/2ML
0.5 INJECTION, SOLUTION INTRAMUSCULAR; INTRAVENOUS
Status: DISCONTINUED | OUTPATIENT
Start: 2025-04-24 | End: 2025-04-24 | Stop reason: HOSPADM

## 2025-04-24 RX ORDER — EPHEDRINE SULFATE 50 MG/ML
INJECTION, SOLUTION INTRAVENOUS AS NEEDED
Status: DISCONTINUED | OUTPATIENT
Start: 2025-04-24 | End: 2025-04-24 | Stop reason: SURG

## 2025-04-24 RX ORDER — LABETALOL HYDROCHLORIDE 5 MG/ML
5 INJECTION, SOLUTION INTRAVENOUS
Status: DISCONTINUED | OUTPATIENT
Start: 2025-04-24 | End: 2025-04-24 | Stop reason: HOSPADM

## 2025-04-24 RX ORDER — FENTANYL CITRATE 50 UG/ML
50 INJECTION, SOLUTION INTRAMUSCULAR; INTRAVENOUS
Status: DISCONTINUED | OUTPATIENT
Start: 2025-04-24 | End: 2025-04-24 | Stop reason: HOSPADM

## 2025-04-24 RX ORDER — SODIUM CHLORIDE 0.9 % (FLUSH) 0.9 %
10 SYRINGE (ML) INJECTION EVERY 12 HOURS SCHEDULED
Status: DISCONTINUED | OUTPATIENT
Start: 2025-04-24 | End: 2025-04-24 | Stop reason: HOSPADM

## 2025-04-24 RX ORDER — BUPIVACAINE HYDROCHLORIDE 5 MG/ML
INJECTION, SOLUTION EPIDURAL; INTRACAUDAL; PERINEURAL AS NEEDED
Status: DISCONTINUED | OUTPATIENT
Start: 2025-04-24 | End: 2025-04-24 | Stop reason: HOSPADM

## 2025-04-24 RX ORDER — SODIUM CHLORIDE, SODIUM LACTATE, POTASSIUM CHLORIDE, CALCIUM CHLORIDE 600; 310; 30; 20 MG/100ML; MG/100ML; MG/100ML; MG/100ML
1000 INJECTION, SOLUTION INTRAVENOUS CONTINUOUS
Status: DISCONTINUED | OUTPATIENT
Start: 2025-04-24 | End: 2025-04-24 | Stop reason: HOSPADM

## 2025-04-24 RX ORDER — FLUMAZENIL 0.1 MG/ML
0.2 INJECTION INTRAVENOUS AS NEEDED
Status: DISCONTINUED | OUTPATIENT
Start: 2025-04-24 | End: 2025-04-24 | Stop reason: HOSPADM

## 2025-04-24 RX ORDER — ONDANSETRON 2 MG/ML
4 INJECTION INTRAMUSCULAR; INTRAVENOUS ONCE AS NEEDED
Status: DISCONTINUED | OUTPATIENT
Start: 2025-04-24 | End: 2025-04-24 | Stop reason: HOSPADM

## 2025-04-24 RX ORDER — IBUPROFEN 600 MG/1
600 TABLET, FILM COATED ORAL EVERY 6 HOURS PRN
Status: DISCONTINUED | OUTPATIENT
Start: 2025-04-24 | End: 2025-04-24 | Stop reason: HOSPADM

## 2025-04-24 RX ORDER — CLINDAMYCIN PHOSPHATE 900 MG/50ML
900 INJECTION, SOLUTION INTRAVENOUS ONCE
Status: COMPLETED | OUTPATIENT
Start: 2025-04-24 | End: 2025-04-24

## 2025-04-24 RX ORDER — DEXAMETHASONE SODIUM PHOSPHATE 4 MG/ML
INJECTION, SOLUTION INTRA-ARTICULAR; INTRALESIONAL; INTRAMUSCULAR; INTRAVENOUS; SOFT TISSUE AS NEEDED
Status: DISCONTINUED | OUTPATIENT
Start: 2025-04-24 | End: 2025-04-24 | Stop reason: SURG

## 2025-04-24 RX ORDER — SODIUM CHLORIDE 0.9 % (FLUSH) 0.9 %
3 SYRINGE (ML) INJECTION AS NEEDED
Status: DISCONTINUED | OUTPATIENT
Start: 2025-04-24 | End: 2025-04-24 | Stop reason: HOSPADM

## 2025-04-24 RX ORDER — NALOXONE HCL 0.4 MG/ML
0.4 VIAL (ML) INJECTION AS NEEDED
Status: DISCONTINUED | OUTPATIENT
Start: 2025-04-24 | End: 2025-04-24 | Stop reason: HOSPADM

## 2025-04-24 RX ORDER — LIDOCAINE HYDROCHLORIDE 20 MG/ML
INJECTION, SOLUTION EPIDURAL; INFILTRATION; INTRACAUDAL; PERINEURAL AS NEEDED
Status: DISCONTINUED | OUTPATIENT
Start: 2025-04-24 | End: 2025-04-24 | Stop reason: SURG

## 2025-04-24 RX ADMIN — SODIUM CHLORIDE, POTASSIUM CHLORIDE, SODIUM LACTATE AND CALCIUM CHLORIDE 1000 ML: 600; 310; 30; 20 INJECTION, SOLUTION INTRAVENOUS at 06:59

## 2025-04-24 RX ADMIN — DEXAMETHASONE SODIUM PHOSPHATE 8 MG: 4 INJECTION, SOLUTION INTRA-ARTICULAR; INTRALESIONAL; INTRAMUSCULAR; INTRAVENOUS; SOFT TISSUE at 08:02

## 2025-04-24 RX ADMIN — ONDANSETRON 4 MG: 2 INJECTION INTRAMUSCULAR; INTRAVENOUS at 07:59

## 2025-04-24 RX ADMIN — EPHEDRINE SULFATE 25 MG: 50 INJECTION, SOLUTION INTRAVENOUS at 08:02

## 2025-04-24 RX ADMIN — LIDOCAINE HYDROCHLORIDE 100 MG: 20 INJECTION, SOLUTION EPIDURAL; INFILTRATION; INTRACAUDAL; PERINEURAL at 07:58

## 2025-04-24 RX ADMIN — CLINDAMYCIN PHOSPHATE 900 MG: 900 INJECTION, SOLUTION INTRAVENOUS at 07:56

## 2025-04-24 RX ADMIN — PROPOFOL 600 MG: 10 INJECTION, EMULSION INTRAVENOUS at 07:58

## 2025-04-24 NOTE — OP NOTE
"Bryant Delgado  4/24/2025     PREOPERATIVE DIAGNOSIS: Amaurosis fugax of right eye [G45.3]  Bilateral carotid artery stenosis [I65.23]  Preop testing [Z01.818]     POSTOPERATIVE DIAGNOSIS: Post-Op Diagnosis Codes:     * Amaurosis fugax of right eye [G45.3]     * Bilateral carotid artery stenosis [I65.23]     * Preop testing [Z01.818]     PROCEDURE PERFORMED:   1.  Right-sided temporal artery biopsy     SURGEON: Jean Concepcion DO      ANESTHESIA: MAC    PREPARATION: Routine.    STAFF: Circulator: Ashley Lucero RN  Assistant: Leann Jin    Estimated Blood Loss: minimal    SPECIMENS: Right sided superficial temporal artery    COMPLICATIONS: None    INDICATIONS: Bryant Delgado is a 65 y.o. male who you are currently following for blurred vision to right eye. He had a \"black curtain\" over vision with complete loss and other episodes have been partial. He is maintained on Pravachol. He had elevated CRP 11.32. ESR was normal. Dr. García's note does not state anything about stroke to eye. The indications, risks, and possible complications of the procedure were explained to the patient, who voiced understanding and wished to proceed with surgery.     PROCEDURE IN DETAIL: The patient was taken to the operating room and placed on the operating table in a supine position. After general anesthesia was obtained, the right side of the face was prepped and draped in a sterile manner.  5 cc of 0.5% Marcaine plain was used to infiltrate the right side of the face for local anesthesia.  Using a 15 blade, a longitudinal incision was made just anterior to the tragus of the ear extending cephalad into the hairline.  Careful dissection was made down through the subcutaneous tissues using the Bovie cautery to ensure hemostasis.  The fascia was incised and the temporal artery was identified.  It was freed from its local attachments with the Metzenbaum scissors.  Proximal and distal control was established with hemostats.  " The artery was transected and removed as specimen.  The proximal and distal ends were tied off with 3-0 silk suture.  The wound bed was irrigated with antibiotic saline and hemostasis was observed.  The deep layers were closed with a 4-0 Vicryl in a running fashion.  The skin was then reapproximated using a 4-0 Monocryl in a subcuticular fashion.  Sterile dressings were applied. The patient tolerated the procedure well. Sponge and needle counts were correct. The patient was then awakened and transferred to the outpatient center in good condition.    Jean Concepcion,   Date: 4/24/2025 Time: 08:12 CDT    CC:Tomas García MD

## 2025-04-24 NOTE — ANESTHESIA PREPROCEDURE EVALUATION
Anesthesia Evaluation     Patient summary reviewed   no history of anesthetic complications:   NPO Solid Status: > 8 hours             Airway   Mallampati: II  Dental    (+) edentulous    Pulmonary    (-) COPD, asthma, sleep apnea, not a smoker  Cardiovascular   Exercise tolerance: good (4-7 METS)    (+) hypertension, hyperlipidemia  (-) pacemaker, past MI, angina, cardiac stents      Neuro/Psych  (-) seizures, TIA, CVA  GI/Hepatic/Renal/Endo    (-) GERD, liver disease, no renal disease, diabetes    Musculoskeletal     Abdominal    Substance History      OB/GYN          Other                    Anesthesia Plan    ASA 2     MAC       Anesthetic plan, risks, benefits, and alternatives have been provided, discussed and informed consent has been obtained with: patient.    CODE STATUS:

## 2025-04-24 NOTE — ANESTHESIA POSTPROCEDURE EVALUATION
Patient: Bryatn Delgado    Procedure Summary       Date: 04/24/25 Room / Location: [facility] OR  /  [facility] OR    Anesthesia Start: 0756 Anesthesia Stop: 0848    Procedure: RIGHT TEMPORAL ARTERY BIOPSY (Right: Head) Diagnosis:       Amaurosis fugax of right eye      Bilateral carotid artery stenosis      Preop testing      (Amaurosis fugax of right eye [G45.3])      (Bilateral carotid artery stenosis [I65.23])      (Preop testing [Z01.818])    Surgeons: Jean Concepcion DO Provider: CON Alexis CRNA    Anesthesia Type: MAC ASA Status: 2            Anesthesia Type: MAC    Vitals  Vitals Value Taken Time   /58 04/24/25 09:46   Temp 97.9 °F (36.6 °C) 04/24/25 08:51   Pulse 59 04/24/25 09:47   Resp 16 04/24/25 09:17   SpO2 95 % 04/24/25 09:47   Vitals shown include unfiled device data.        Post Anesthesia Care and Evaluation    Patient location during evaluation: PACU  Patient participation: complete - patient participated  Level of consciousness: awake and awake and alert  Pain score: 0  Pain management: adequate    Airway patency: patent  Anesthetic complications: No anesthetic complications  PONV Status: none  Cardiovascular status: acceptable  Respiratory status: acceptable  Hydration status: acceptable    Comments: Patient discharged according to acceptable Kaitlyn score per RN assessment. See nursing records for further information.     Blood pressure 112/55, pulse 72, temperature 97.9 °F (36.6 °C), temperature source Temporal, resp. rate 16, SpO2 98%.

## 2025-04-25 ENCOUNTER — RESULTS FOLLOW-UP (OUTPATIENT)
Dept: PERIOP | Facility: HOSPITAL | Age: 65
End: 2025-04-25
Payer: MEDICARE

## 2025-04-25 LAB
CYTO UR: NORMAL
LAB AP CASE REPORT: NORMAL
Lab: NORMAL
PATH REPORT.FINAL DX SPEC: NORMAL
PATH REPORT.GROSS SPEC: NORMAL

## 2025-04-25 NOTE — TELEPHONE ENCOUNTER
Spoke with patient's wife informing of negative result for temporal arteritis. Results faxed to Dr. Nicolas García @ [phone].

## 2025-04-30 ENCOUNTER — TELEPHONE (OUTPATIENT)
Dept: VASCULAR SURGERY | Facility: CLINIC | Age: 65
End: 2025-04-30
Payer: MEDICARE

## 2025-04-30 NOTE — TELEPHONE ENCOUNTER
Spoke with patient and they confirmed their appointment.       Ear Star Wedge Flap Text: The defect edges were debeveled with a #15 blade scalpel.  Given the location of the defect and the proximity to free margins (helical rim) an ear star wedge flap was deemed most appropriate.  Using a sterile surgical marker, the appropriate flap was drawn incorporating the defect and placing the expected incisions between the helical rim and antihelix where possible.  The area thus outlined was incised through and through with a #15 scalpel blade.

## 2025-05-01 ENCOUNTER — PRE-ADMISSION TESTING (OUTPATIENT)
Dept: PREADMISSION TESTING | Facility: HOSPITAL | Age: 65
End: 2025-05-01
Payer: MEDICARE

## 2025-05-01 ENCOUNTER — OFFICE VISIT (OUTPATIENT)
Dept: VASCULAR SURGERY | Facility: CLINIC | Age: 65
End: 2025-05-01
Payer: MEDICARE

## 2025-05-01 ENCOUNTER — HOSPITAL ENCOUNTER (OUTPATIENT)
Dept: CT IMAGING | Facility: HOSPITAL | Age: 65
Discharge: HOME | End: 2025-05-01
Payer: MEDICARE

## 2025-05-01 ENCOUNTER — TELEPHONE (OUTPATIENT)
Dept: VASCULAR SURGERY | Facility: CLINIC | Age: 65
End: 2025-05-01
Payer: MEDICARE

## 2025-05-01 VITALS
DIASTOLIC BLOOD PRESSURE: 68 MMHG | BODY MASS INDEX: 25.66 KG/M2 | HEIGHT: 65 IN | SYSTOLIC BLOOD PRESSURE: 118 MMHG | HEART RATE: 55 BPM | OXYGEN SATURATION: 97 % | WEIGHT: 154 LBS

## 2025-05-01 VITALS
HEIGHT: 64 IN | BODY MASS INDEX: 27.21 KG/M2 | WEIGHT: 159.39 LBS | DIASTOLIC BLOOD PRESSURE: 63 MMHG | HEART RATE: 66 BPM | SYSTOLIC BLOOD PRESSURE: 123 MMHG | RESPIRATION RATE: 18 BRPM | OXYGEN SATURATION: 98 %

## 2025-05-01 DIAGNOSIS — I65.23 BILATERAL CAROTID ARTERY STENOSIS: ICD-10-CM

## 2025-05-01 DIAGNOSIS — Z51.81 ENCOUNTER FOR MONITORING ANTIPLATELET THERAPY: ICD-10-CM

## 2025-05-01 DIAGNOSIS — G45.3 AMAUROSIS FUGAX OF RIGHT EYE: ICD-10-CM

## 2025-05-01 DIAGNOSIS — I65.21 STENOSIS OF RIGHT CAROTID ARTERY: Primary | ICD-10-CM

## 2025-05-01 DIAGNOSIS — Z01.818 PREOP TESTING: ICD-10-CM

## 2025-05-01 DIAGNOSIS — Z79.02 ENCOUNTER FOR MONITORING ANTIPLATELET THERAPY: ICD-10-CM

## 2025-05-01 DIAGNOSIS — I65.21 STENOSIS OF RIGHT CAROTID ARTERY: ICD-10-CM

## 2025-05-01 PROCEDURE — 70498 CT ANGIOGRAPHY NECK: CPT

## 2025-05-01 PROCEDURE — 93010 ELECTROCARDIOGRAM REPORT: CPT | Performed by: INTERNAL MEDICINE

## 2025-05-01 PROCEDURE — 93005 ELECTROCARDIOGRAM TRACING: CPT

## 2025-05-01 PROCEDURE — 25510000001 IOPAMIDOL 61 % SOLUTION: Performed by: SURGERY

## 2025-05-01 RX ORDER — IOPAMIDOL 612 MG/ML
100 INJECTION, SOLUTION INTRAVASCULAR
Status: COMPLETED | OUTPATIENT
Start: 2025-05-01 | End: 2025-05-01

## 2025-05-01 RX ORDER — CLOPIDOGREL BISULFATE 75 MG/1
75 TABLET ORAL DAILY
Qty: 30 TABLET | Refills: 5 | Status: ON HOLD | OUTPATIENT
Start: 2025-05-01

## 2025-05-01 RX ADMIN — IOPAMIDOL 100 ML: 612 INJECTION, SOLUTION INTRAVENOUS at 10:03

## 2025-05-01 NOTE — LETTER
"May 1, 2025     Tomas García MD  [address] [facility] [address]    Patient: Bryant Delgado   YOB: 1960   Date of Visit: 5/1/2025     Dear Tomas García MD:       Thank you for referring Bryant Delgado to me for evaluation. Below are the relevant portions of my assessment and plan of care.    If you have questions, please do not hesitate to call me. I look forward to following Bryant along with you.         Sincerely,        ALEJANDRO Lopez        CC: MD Gloria Hendrickson Brandy, APRN  05/01/25 1340  Sign when Signing Visit  5/1/2025       Tomas García MD  [address] [place] [id number]    Bryant Delgado  1960    Chief Complaint   Patient presents with   • Post-op     1 week post op. Right temporal biopsy done 4/24/25. Testing also done today. Complains of forming rash on legs and arm after surgery, but faded away past few days.        Dear Tomas García MD      I had the pleasure of seeing your patient Bryant Delgado in the office today.    As you recall, Bryant Delgado is a 65 y.o.  male who you are currently following for blurred vision to right eye.  He had a \"black curtain\" over vision with complete loss and other episodes have been partial.  He is maintained on aspirin and Pravachol.  He had elevated CRP 11.32.  ESR was normal.  He did have a right temporal artery biopsy on 4/24/2025 which was negative.  He did have a CTA of the neck performed today, which I did review in office.    Review of Systems   Constitutional: Negative.    HENT: Negative.     Eyes:  Positive for visual disturbance.   Respiratory: Negative.     Cardiovascular: Negative.    Gastrointestinal: Negative.    Endocrine: Negative.    Genitourinary: Negative.    Musculoskeletal: Negative.    Skin: Negative.    Allergic/Immunologic: Negative.    Neurological: Negative.    Hematological: Negative.    Psychiatric/Behavioral: Negative.     All other " "systems reviewed and are negative.    /68   Pulse 55   Ht 165.1 cm (65\")   Wt 69.9 kg (154 lb)   SpO2 97%   BMI 25.63 kg/m²     Physical Exam  Vitals and nursing note reviewed.   Constitutional:       General: He is not in acute distress.     Appearance: Normal appearance. He is well-developed and normal weight. He is not diaphoretic.   HENT:      Head: Normocephalic and atraumatic.     Neck:      Vascular: No carotid bruit or JVD.   Cardiovascular:      Rate and Rhythm: Normal rate and regular rhythm.      Pulses: Normal pulses.           Femoral pulses are 2+ on the right side and 2+ on the left side.       Popliteal pulses are 2+ on the right side and 2+ on the left side.        Dorsalis pedis pulses are 2+ on the right side and 2+ on the left side.        Posterior tibial pulses are 2+ on the right side and 2+ on the left side.      Heart sounds: Normal heart sounds, S1 normal and S2 normal. No murmur heard.     No friction rub. No gallop.   Pulmonary:      Effort: Pulmonary effort is normal.      Breath sounds: Normal breath sounds.   Abdominal:      General: Bowel sounds are normal. There is no abdominal bruit.      Palpations: Abdomen is soft.      Tenderness: There is no abdominal tenderness.   Musculoskeletal:         General: Normal range of motion.   Skin:     General: Skin is warm and dry.   Neurological:      General: No focal deficit present.      Mental Status: He is alert and oriented to person, place, and time.      Cranial Nerves: No cranial nerve deficit.   Psychiatric:         Mood and Affect: Mood normal.         Behavior: Behavior normal.         Thought Content: Thought content normal.         Judgment: Judgment normal.        Diagnostic data:  CT Angiogram Neck  Result Date: 5/1/2025  Narrative: CT ANGIOGRAM NECK- 5/1/2025 8:34 AM  HISTORY: Carotid artery stenosis  DOSE LENGTH PRODUCT: 228.32 mGy.cm. Automated exposure control was also utilized to decrease patient radiation dose.  " EXAM: Axial CT angiogram of the neck after the uneventful administration of Isovue IV contrast. Sagittal and coronal reformations were also provided for review. 3D images were created on an independent workstation to better evaluate potential vascular abnormalities.  COMPARISON: Ultrasound dated 5/20/2024  FINDINGS:  The right brachiocephalic and left common carotid arteries arise from a common origin. The common carotid arteries are normal in caliber. Prominent soft plaque at the right ICA origin resulting in a 78% narrowing. There is only a calculated 5% atheromatous narrowing at the left ICA origin. Distal ICAs are patent. Patent vertebral arteries which are normal in caliber and codominant. Intracranial vertebral arteries and basilar artery are patent. The visualized intracranial contents are unremarkable. No cervical adenopathy. Prior ACDF. Included portions of the lung apices are clear.      Impression:  1.  Prominent soft plaque at the right ICA origin with calculated 78% narrowing. 2.  Only 5% atheromatous narrowing at the left ICA origin. 3.  Vertebral arteries are patent, normal in caliber and codominant.  This report was signed and finalized on 5/1/2025 11:17 AM by Dr Paul Rick.         Patient Active Problem List   Diagnosis   • Sensorineural hearing loss (SNHL) of both ears   • Tinnitus of both ears   • Auditory hallucinations   • RMSF (Bryce Mountain spotted fever)   • Amaurosis fugax of right eye   • Bilateral carotid artery stenosis   • Preop testing   • Stenosis of right carotid artery        Diagnosis Plan   1. Stenosis of right carotid artery  CBC and Differential    Basic metabolic panel    APTT    Protime-INR    ECG 12 Lead    Case Request    clindamycin (CLEOCIN) 900 mg in dextrose (D5W) 5 % 100 mL IVPB    Case Request      2. Preop testing  CBC and Differential    Basic metabolic panel    APTT    Protime-INR    ECG 12 Lead    Case Request    clindamycin (CLEOCIN) 900 mg in dextrose (D5W) 5  % 100 mL IVPB    Case Request      3. Encounter for monitoring antiplatelet therapy  APTT            Plan: After thoroughly evaluating Bryant Delgado, I believe the best course of action is to proceed with right transcarotid artery revascularization.  I did review his testing and there is a significant 90% focal narrowing with soft plaque noted.  This is  what caused his amaurosis fugax.  His temporal artery biopsy was negative.  His incision has healed.  I have started him on Plavix in addition to aspirin and his statin.  He will begin taking the Plavix today and we will proceed with surgical intervention on Monday.  He does not have a cardiac history but given symptoms we will proceed with surgical intervention on Monday Without cardiac clearance.  He is to continue aspirin, Plavix, and Pravachol without interruption.  A formal shared decision making interaction with the patient was had during this discussion.  The shared decision making interaction included discussion of all treatment options including carotid endarterectomy, carotid artery stenting which does include TCAR, and optimal medical therapy.  Explanation of the risks/benefits for each option specific to the patient's clinical situation was also performed.  Integration of clinical guidelines which include patient comorbidities and concomitant treatments were discussed.  Lastly, the discussion and incorporation of the patient's personal preferences and priorities in choosing the treatment plan were also discussed.  Risks of transcarotid artery revascularization include, but are not limited to, bleeding, infection, vessel damage, nerve damage, MI, stroke, and death.  The patient understands these risks and would like to proceed with the procedure. NIHSS=0.  Patient is to continue taking their medications as previously discussed.   This was all discussed in full with complete understanding.  Thank you for allowing me to participate in the care of your  patient.  Please do not hesitate to call with any questions or concerns.  We will keep you aware of any further encounters with Bryant Delgado.        Sincerely yours,         ALEJANDRO Lopez, Bennett Caldwell MD

## 2025-05-01 NOTE — PROGRESS NOTES
"5/1/2025       Tomas García MD  [address] [place] [address]    Bryant Delgado  1960    Chief Complaint   Patient presents with    Post-op     1 week post op. Right temporal biopsy done 4/24/25. Testing also done today. Complains of forming rash on legs and arm after surgery, but faded away past few days.        Dear Tomas García MD      I had the pleasure of seeing your patient Bryant Delgado in the office today.    As you recall, Bryant Delgado is a 65 y.o.  male who you are currently following for blurred vision to right eye.  He had a \"black curtain\" over vision with complete loss and other episodes have been partial.  He is maintained on aspirin and Pravachol.  He had elevated CRP 11.32.  ESR was normal.  He did have a right temporal artery biopsy on 4/24/2025 which was negative.  He did have a CTA of the neck performed today, which I did review in office.    Review of Systems   Constitutional: Negative.    HENT: Negative.     Eyes:  Positive for visual disturbance.   Respiratory: Negative.     Cardiovascular: Negative.    Gastrointestinal: Negative.    Endocrine: Negative.    Genitourinary: Negative.    Musculoskeletal: Negative.    Skin: Negative.    Allergic/Immunologic: Negative.    Neurological: Negative.    Hematological: Negative.    Psychiatric/Behavioral: Negative.     All other systems reviewed and are negative.    /68   Pulse 55   Ht 165.1 cm (65\")   Wt 69.9 kg (154 lb)   SpO2 97%   BMI 25.63 kg/m²     Physical Exam  Vitals and nursing note reviewed.   Constitutional:       General: He is not in acute distress.     Appearance: Normal appearance. He is well-developed and normal weight. He is not diaphoretic.   HENT:      Head: Normocephalic and atraumatic.     Neck:      Vascular: No carotid bruit or JVD.   Cardiovascular:      Rate and Rhythm: Normal rate and regular rhythm.      Pulses: Normal pulses.           Femoral pulses are 2+ on the right side " and 2+ on the left side.       Popliteal pulses are 2+ on the right side and 2+ on the left side.        Dorsalis pedis pulses are 2+ on the right side and 2+ on the left side.        Posterior tibial pulses are 2+ on the right side and 2+ on the left side.      Heart sounds: Normal heart sounds, S1 normal and S2 normal. No murmur heard.     No friction rub. No gallop.   Pulmonary:      Effort: Pulmonary effort is normal.      Breath sounds: Normal breath sounds.   Abdominal:      General: Bowel sounds are normal. There is no abdominal bruit.      Palpations: Abdomen is soft.      Tenderness: There is no abdominal tenderness.   Musculoskeletal:         General: Normal range of motion.   Skin:     General: Skin is warm and dry.   Neurological:      General: No focal deficit present.      Mental Status: He is alert and oriented to person, place, and time.      Cranial Nerves: No cranial nerve deficit.   Psychiatric:         Mood and Affect: Mood normal.         Behavior: Behavior normal.         Thought Content: Thought content normal.         Judgment: Judgment normal.        Diagnostic data:  CT Angiogram Neck  Result Date: 5/1/2025  Narrative: CT ANGIOGRAM NECK- 5/1/2025 8:34 AM  HISTORY: Carotid artery stenosis  DOSE LENGTH PRODUCT: 228.32 mGy.cm. Automated exposure control was also utilized to decrease patient radiation dose.  EXAM: Axial CT angiogram of the neck after the uneventful administration of Isovue IV contrast. Sagittal and coronal reformations were also provided for review. 3D images were created on an independent workstation to better evaluate potential vascular abnormalities.  COMPARISON: Ultrasound dated 5/20/2024  FINDINGS:  The right brachiocephalic and left common carotid arteries arise from a common origin. The common carotid arteries are normal in caliber. Prominent soft plaque at the right ICA origin resulting in a 78% narrowing. There is only a calculated 5% atheromatous narrowing at the left  ICA origin. Distal ICAs are patent. Patent vertebral arteries which are normal in caliber and codominant. Intracranial vertebral arteries and basilar artery are patent. The visualized intracranial contents are unremarkable. No cervical adenopathy. Prior ACDF. Included portions of the lung apices are clear.      Impression:  1.  Prominent soft plaque at the right ICA origin with calculated 78% narrowing. 2.  Only 5% atheromatous narrowing at the left ICA origin. 3.  Vertebral arteries are patent, normal in caliber and codominant.  This report was signed and finalized on 5/1/2025 11:17 AM by Dr Paul Rick.         Patient Active Problem List   Diagnosis    Sensorineural hearing loss (SNHL) of both ears    Tinnitus of both ears    Auditory hallucinations    RMSF (Bryce Mountain spotted fever)    Amaurosis fugax of right eye    Bilateral carotid artery stenosis    Preop testing    Stenosis of right carotid artery        Diagnosis Plan   1. Stenosis of right carotid artery  CBC and Differential    Basic metabolic panel    APTT    Protime-INR    ECG 12 Lead    Case Request    clindamycin (CLEOCIN) 900 mg in dextrose (D5W) 5 % 100 mL IVPB    Case Request      2. Preop testing  CBC and Differential    Basic metabolic panel    APTT    Protime-INR    ECG 12 Lead    Case Request    clindamycin (CLEOCIN) 900 mg in dextrose (D5W) 5 % 100 mL IVPB    Case Request      3. Encounter for monitoring antiplatelet therapy  APTT            Plan: After thoroughly evaluating Bryant Delgado, I believe the best course of action is to proceed with right transcarotid artery revascularization.  I did review his testing and there is a significant 90% focal narrowing with soft plaque noted.  This is  what caused his amaurosis fugax.  His temporal artery biopsy was negative.  His incision has healed.  I have started him on Plavix in addition to aspirin and his statin.  He will begin taking the Plavix today and we will proceed with surgical  intervention on Monday.  He does not have a cardiac history but given symptoms we will proceed with surgical intervention on Monday Without cardiac clearance.  He is to continue aspirin, Plavix, and Pravachol without interruption.  A formal shared decision making interaction with the patient was had during this discussion.  The shared decision making interaction included discussion of all treatment options including carotid endarterectomy, carotid artery stenting which does include TCAR, and optimal medical therapy.  Explanation of the risks/benefits for each option specific to the patient's clinical situation was also performed.  Integration of clinical guidelines which include patient comorbidities and concomitant treatments were discussed.  Lastly, the discussion and incorporation of the patient's personal preferences and priorities in choosing the treatment plan were also discussed.  Risks of transcarotid artery revascularization include, but are not limited to, bleeding, infection, vessel damage, nerve damage, MI, stroke, and death.  The patient understands these risks and would like to proceed with the procedure. NIHSS=0.  Patient is to continue taking their medications as previously discussed.   This was all discussed in full with complete understanding.  Thank you for allowing me to participate in the care of your patient.  Please do not hesitate to call with any questions or concerns.  We will keep you aware of any further encounters with Bryant Delgado.        Sincerely yours,         ALEJANDRO Lopez, Bennett Caldwell MD

## 2025-05-01 NOTE — DISCHARGE INSTRUCTIONS

## 2025-05-01 NOTE — TELEPHONE ENCOUNTER
Spoke with patient  Your surgery is scheduled for 05/05/2025, you need to be at the hospital at 10:00 am.  Nothing to eat or drink after midnight the night before your procedure. Patient  will continue to take medication regiment uninterrupted prior to surgery.  Your pre work is scheduled for 05/01/2025, you need to be at the hospital at 2:30 pm.  Patient will be NPO after midnight day of procedure. You do not need to fast before your pre work. If you have any questions or concerns, please contact our office at [phone].  Patient verbalized understanding.

## 2025-05-02 ENCOUNTER — TELEPHONE (OUTPATIENT)
Dept: VASCULAR SURGERY | Facility: CLINIC | Age: 65
End: 2025-05-02
Payer: MEDICARE

## 2025-05-02 NOTE — TELEPHONE ENCOUNTER
Spoke with patient  Your surgery is scheduled for 05/05/2025, you need to be at the hospital at 10:00 am.  Nothing to eat or drink after midnight the night before your procedure. Patient  will continue to take medication regiment uninterrupted prior to surgery.  Your pre work is scheduled for 05/01/2025, you need to be at the hospital at 2:30 pm.  Patient will be NPO after midnight day of procedure. You do not need to fast before your pre work. If you have any questions or concerns, please contact our office at [phone].  Patient verbalized understanding.

## 2025-05-03 LAB
QT INTERVAL: 416 MS
QTC INTERVAL: 401 MS

## 2025-05-03 NOTE — H&P
"5/3/2025           Tomas García MD  [address] [place] [address]     Bryant Delgado  1960          Chief Complaint   Patient presents with    Post-op       1 week post op. Right temporal biopsy done 4/24/25. Testing also done today. Complains of forming rash on legs and arm after surgery, but faded away past few days.          Dear Tomas García MD        I had the pleasure of seeing your patient Bryant Delgado in the office today.    As you recall, Bryant Delgado is a 65 y.o.  male who you are currently following for blurred vision to right eye.  He had a \"black curtain\" over vision with complete loss and other episodes have been partial.  He is maintained on aspirin and Pravachol.  He had elevated CRP 11.32.  ESR was normal.  He did have a right temporal artery biopsy on 4/24/2025 which was negative.  He did have a CTA of the neck performed today, which I did review in office.    Past Medical History:   Diagnosis Date    Arthritis     Cataract     right eye    Diverticulitis     Hyperlipidemia     Hypertension     Neuropathy     RMSF (Bryce Mountain spotted fever) 08/2023    Sensorineural hearing loss (SNHL) of both ears 06/03/2020      Past Surgical History:   Procedure Laterality Date    CERVICAL FUSION      COLONOSCOPY  01/2023    LUMBAR LAMINECTOMY      TEMPORAL ARTERY BIOPSY Right 4/24/2025    Procedure: RIGHT TEMPORAL ARTERY BIOPSY;  Surgeon: Jean Concepcion DO;  Location: [facility];  Service: Vascular;  Laterality: Right;     Social History     Socioeconomic History    Marital status:    Tobacco Use    Smoking status: Never    Smokeless tobacco: Current     Types: Snuff   Vaping Use    Vaping status: Never Used   Substance and Sexual Activity    Alcohol use: No    Drug use: No    Sexual activity: Defer     No family history on file.  Allergies   Allergen Reactions    Keflex [Cephalexin] Itching and Swelling     Current Outpatient Medications " "  Medication Instructions    aspirin 81 mg, Oral, Daily    clopidogrel (PLAVIX) 75 mg, Oral, Daily    gabapentin (NEURONTIN) 600 MG tablet 3 Times Daily    lisinopril (PRINIVIL,ZESTRIL) 20 mg, Oral, 2 Times Daily    meloxicam (MOBIC) 15 mg, Daily PRN    metoprolol succinate XL (TOPROL-XL) 25 mg, Oral, 2 Times Daily    oxyCODONE-acetaminophen (PERCOCET) 7.5-325 MG per tablet 1 tablet, 4 Times Daily PRN    pravastatin (PRAVACHOL) 40 mg, Oral, Nightly    Vitamin B 12 1,000 mcg, Daily    vitamin D3 5,000 Units, Daily        Review of Systems   Constitutional: Negative.    HENT: Negative.     Eyes:  Positive for visual disturbance.   Respiratory: Negative.     Cardiovascular: Negative.    Gastrointestinal: Negative.    Endocrine: Negative.    Genitourinary: Negative.    Musculoskeletal: Negative.    Skin: Negative.    Allergic/Immunologic: Negative.    Neurological: Negative.    Hematological: Negative.    Psychiatric/Behavioral: Negative.     All other systems reviewed and are negative.     /68   Pulse 55   Ht 165.1 cm (65\")   Wt 69.9 kg (154 lb)   SpO2 97%   BMI 25.63 kg/m²      Physical Exam  Vitals and nursing note reviewed.   Constitutional:       General: He is not in acute distress.     Appearance: Normal appearance. He is well-developed and normal weight. He is not diaphoretic.   HENT:      Head: Normocephalic and atraumatic.     Neck:      Vascular: No carotid bruit or JVD.   Cardiovascular:      Rate and Rhythm: Normal rate and regular rhythm.      Pulses: Normal pulses.           Femoral pulses are 2+ on the right side and 2+ on the left side.       Popliteal pulses are 2+ on the right side and 2+ on the left side.        Dorsalis pedis pulses are 2+ on the right side and 2+ on the left side.        Posterior tibial pulses are 2+ on the right side and 2+ on the left side.      Heart sounds: Normal heart sounds, S1 normal and S2 normal. No murmur heard.     No friction rub. No gallop.   Pulmonary:    "   Effort: Pulmonary effort is normal.      Breath sounds: Normal breath sounds.   Abdominal:      General: Bowel sounds are normal. There is no abdominal bruit.      Palpations: Abdomen is soft.      Tenderness: There is no abdominal tenderness.   Musculoskeletal:         General: Normal range of motion.   Skin:     General: Skin is warm and dry.   Neurological:      General: No focal deficit present.      Mental Status: He is alert and oriented to person, place, and time.      Cranial Nerves: No cranial nerve deficit.   Psychiatric:         Mood and Affect: Mood normal.         Behavior: Behavior normal.         Thought Content: Thought content normal.         Judgment: Judgment normal.          Diagnostic data:  CT Angiogram Neck  Result Date: 5/1/2025  Narrative: CT ANGIOGRAM NECK- 5/1/2025 8:34 AM  HISTORY: Carotid artery stenosis  DOSE LENGTH PRODUCT: 228.32 mGy.cm. Automated exposure control was also utilized to decrease patient radiation dose.  EXAM: Axial CT angiogram of the neck after the uneventful administration of Isovue IV contrast. Sagittal and coronal reformations were also provided for review. 3D images were created on an independent workstation to better evaluate potential vascular abnormalities.  COMPARISON: Ultrasound dated 5/20/2024  FINDINGS:  The right brachiocephalic and left common carotid arteries arise from a common origin. The common carotid arteries are normal in caliber. Prominent soft plaque at the right ICA origin resulting in a 78% narrowing. There is only a calculated 5% atheromatous narrowing at the left ICA origin. Distal ICAs are patent. Patent vertebral arteries which are normal in caliber and codominant. Intracranial vertebral arteries and basilar artery are patent. The visualized intracranial contents are unremarkable. No cervical adenopathy. Prior ACDF. Included portions of the lung apices are clear.       Impression:  1.  Prominent soft plaque at the right ICA origin with  calculated 78% narrowing. 2.  Only 5% atheromatous narrowing at the left ICA origin. 3.  Vertebral arteries are patent, normal in caliber and codominant.  This report was signed and finalized on 5/1/2025 11:17 AM by Dr Paul Rick.          Problem List       Patient Active Problem List   Diagnosis    Sensorineural hearing loss (SNHL) of both ears    Tinnitus of both ears    Auditory hallucinations    RMSF (Bryce Mountain spotted fever)    Amaurosis fugax of right eye    Bilateral carotid artery stenosis    Preop testing    Stenosis of right carotid artery              Diagnosis Plan   1. Stenosis of right carotid artery  CBC and Differential     Basic metabolic panel     APTT     Protime-INR     ECG 12 Lead     Case Request     clindamycin (CLEOCIN) 900 mg in dextrose (D5W) 5 % 100 mL IVPB     Case Request       2. Preop testing  CBC and Differential     Basic metabolic panel     APTT     Protime-INR     ECG 12 Lead     Case Request     clindamycin (CLEOCIN) 900 mg in dextrose (D5W) 5 % 100 mL IVPB     Case Request       3. Encounter for monitoring antiplatelet therapy  APTT                Plan: After thoroughly evaluating Bryant Delgado, I believe the best course of action is to proceed with right transcarotid artery revascularization.  I did review his testing and there is a significant 90% focal narrowing with soft plaque noted.  This is  what caused his amaurosis fugax.  His temporal artery biopsy was negative.  His incision has healed.  I have started him on Plavix in addition to aspirin and his statin.  He will begin taking the Plavix today and we will proceed with surgical intervention on Monday.  He does not have a cardiac history but given symptoms we will proceed with surgical intervention on Monday Without cardiac clearance.  He is to continue aspirin, Plavix, and Pravachol without interruption.  A formal shared decision making interaction with the patient was had during this discussion.  The shared  decision making interaction included discussion of all treatment options including carotid endarterectomy, carotid artery stenting which does include TCAR, and optimal medical therapy.  Explanation of the risks/benefits for each option specific to the patient's clinical situation was also performed.  Integration of clinical guidelines which include patient comorbidities and concomitant treatments were discussed.  Lastly, the discussion and incorporation of the patient's personal preferences and priorities in choosing the treatment plan were also discussed.  Risks of transcarotid artery revascularization include, but are not limited to, bleeding, infection, vessel damage, nerve damage, MI, stroke, and death.  The patient understands these risks and would like to proceed with the procedure. NIHSS=0.  Patient is to continue taking their medications as previously discussed.   This was all discussed in full with complete understanding.  Thank you for allowing me to participate in the care of your patient.  Please do not hesitate to call with any questions or concerns.  We will keep you aware of any further encounters with Bryant Delgado.         Sincerely yours,           DO Kay Esteves, Bennett Caldwell MD

## 2025-05-04 ENCOUNTER — ANESTHESIA EVENT (OUTPATIENT)
Dept: PERIOP | Facility: HOSPITAL | Age: 65
DRG: 036 | End: 2025-05-04
Payer: MEDICARE

## 2025-05-05 ENCOUNTER — ANESTHESIA (OUTPATIENT)
Dept: PERIOP | Facility: HOSPITAL | Age: 65
DRG: 036 | End: 2025-05-05
Payer: MEDICARE

## 2025-05-05 ENCOUNTER — APPOINTMENT (OUTPATIENT)
Dept: INTERVENTIONAL RADIOLOGY/VASCULAR | Facility: HOSPITAL | Age: 65
DRG: 036 | End: 2025-05-05
Payer: MEDICARE

## 2025-05-05 ENCOUNTER — HOSPITAL ENCOUNTER (INPATIENT)
Facility: HOSPITAL | Age: 65
LOS: 1 days | Discharge: HOME | DRG: 036 | End: 2025-05-06
Attending: SURGERY | Admitting: SURGERY
Payer: MEDICARE

## 2025-05-05 DIAGNOSIS — I65.21 STENOSIS OF RIGHT CAROTID ARTERY: ICD-10-CM

## 2025-05-05 DIAGNOSIS — G89.18 ACUTE POST-OPERATIVE PAIN: Primary | ICD-10-CM

## 2025-05-05 DIAGNOSIS — Z01.818 PREOP TESTING: ICD-10-CM

## 2025-05-05 LAB
ABO GROUP BLD: NORMAL
ACT BLD: 239 SECONDS (ref 82–152)
BLD GP AB SCN SERPL QL: NEGATIVE
RH BLD: POSITIVE
T&S EXPIRATION DATE: NORMAL

## 2025-05-05 PROCEDURE — 25010000002 ONDANSETRON PER 1 MG: Performed by: NURSE ANESTHETIST, CERTIFIED REGISTERED

## 2025-05-05 PROCEDURE — 037K3DZ DILATION OF RIGHT INTERNAL CAROTID ARTERY WITH INTRALUMINAL DEVICE, PERCUTANEOUS APPROACH: ICD-10-PCS | Performed by: SURGERY

## 2025-05-05 PROCEDURE — 25010000002 PROPOFOL 10 MG/ML EMULSION: Performed by: NURSE ANESTHETIST, CERTIFIED REGISTERED

## 2025-05-05 PROCEDURE — 25010000002 HEPARIN (PORCINE) PER 1000 UNITS: Performed by: SURGERY

## 2025-05-05 PROCEDURE — 85347 COAGULATION TIME ACTIVATED: CPT

## 2025-05-05 PROCEDURE — 25010000002 CLINDAMYCIN 900 MG/50ML SOLUTION: Performed by: NURSE PRACTITIONER

## 2025-05-05 PROCEDURE — C1894 INTRO/SHEATH, NON-LASER: HCPCS | Performed by: SURGERY

## 2025-05-05 PROCEDURE — C1769 GUIDE WIRE: HCPCS | Performed by: SURGERY

## 2025-05-05 PROCEDURE — X2AH336 CEREBRAL EMBOLIC FILTRATION, EXTRACORPOREAL FLOW REVERSAL CIRCUIT FROM RIGHT COMMON CAROTID ARTERY, PERCUTANEOUS APPROACH, NEW TECHNOLOGY GROUP 6: ICD-10-PCS | Performed by: SURGERY

## 2025-05-05 PROCEDURE — C1876 STENT, NON-COA/NON-COV W/DEL: HCPCS | Performed by: SURGERY

## 2025-05-05 PROCEDURE — 25010000002 SUGAMMADEX 200 MG/2ML SOLUTION: Performed by: NURSE ANESTHETIST, CERTIFIED REGISTERED

## 2025-05-05 PROCEDURE — 86900 BLOOD TYPING SEROLOGIC ABO: CPT | Performed by: NURSE PRACTITIONER

## 2025-05-05 PROCEDURE — 25010000002 LIDOCAINE PF 2% 2 % SOLUTION: Performed by: NURSE ANESTHETIST, CERTIFIED REGISTERED

## 2025-05-05 PROCEDURE — 25010000002 HEPARIN (PORCINE) PER 1000 UNITS: Performed by: NURSE ANESTHETIST, CERTIFIED REGISTERED

## 2025-05-05 PROCEDURE — 25510000002 IOVERSOL 74 % SOLUTION: Performed by: SURGERY

## 2025-05-05 PROCEDURE — 25010000002 PROTAMINE SULFATE PER 10 MG: Performed by: NURSE ANESTHETIST, CERTIFIED REGISTERED

## 2025-05-05 PROCEDURE — 86850 RBC ANTIBODY SCREEN: CPT | Performed by: NURSE PRACTITIONER

## 2025-05-05 PROCEDURE — 76000 FLUOROSCOPY <1 HR PHYS/QHP: CPT

## 2025-05-05 PROCEDURE — 25010000002 BUPIVACAINE 0.5 % SOLUTION: Performed by: SURGERY

## 2025-05-05 PROCEDURE — 86901 BLOOD TYPING SEROLOGIC RH(D): CPT | Performed by: NURSE PRACTITIONER

## 2025-05-05 PROCEDURE — 25010000002 CLINDAMYCIN 900 MG/50ML SOLUTION: Performed by: SURGERY

## 2025-05-05 PROCEDURE — 37215 TRANSCATH STENT CCA W/EPS: CPT | Performed by: SURGERY

## 2025-05-05 PROCEDURE — C1725 CATH, TRANSLUMIN NON-LASER: HCPCS | Performed by: SURGERY

## 2025-05-05 PROCEDURE — 25810000003 SODIUM CHLORIDE 0.9 % SOLUTION 250 ML FLEX CONT: Performed by: NURSE ANESTHETIST, CERTIFIED REGISTERED

## 2025-05-05 PROCEDURE — 25010000002 PHENYLEPHRINE 10 MG/ML SOLUTION 1 ML VIAL: Performed by: NURSE ANESTHETIST, CERTIFIED REGISTERED

## 2025-05-05 PROCEDURE — 25810000003 LACTATED RINGERS PER 1000 ML: Performed by: SURGERY

## 2025-05-05 DEVICE — STNT TRANSCAROTID ENROUTE TAPR W/DS 6F 9TO7X30MM: Type: IMPLANTABLE DEVICE | Site: CAROTID | Status: FUNCTIONAL

## 2025-05-05 RX ORDER — FENTANYL CITRATE 50 UG/ML
50 INJECTION, SOLUTION INTRAMUSCULAR; INTRAVENOUS
Status: DISCONTINUED | OUTPATIENT
Start: 2025-05-05 | End: 2025-05-05 | Stop reason: HOSPADM

## 2025-05-05 RX ORDER — SODIUM CHLORIDE, SODIUM LACTATE, POTASSIUM CHLORIDE, CALCIUM CHLORIDE 600; 310; 30; 20 MG/100ML; MG/100ML; MG/100ML; MG/100ML
1000 INJECTION, SOLUTION INTRAVENOUS CONTINUOUS
Status: DISCONTINUED | OUTPATIENT
Start: 2025-05-05 | End: 2025-05-05

## 2025-05-05 RX ORDER — ACETAMINOPHEN 325 MG/1
650 TABLET ORAL EVERY 8 HOURS
Status: DISCONTINUED | OUTPATIENT
Start: 2025-05-05 | End: 2025-05-06 | Stop reason: HOSPADM

## 2025-05-05 RX ORDER — HEPARIN SODIUM 1000 [USP'U]/ML
INJECTION, SOLUTION INTRAVENOUS; SUBCUTANEOUS AS NEEDED
Status: DISCONTINUED | OUTPATIENT
Start: 2025-05-05 | End: 2025-05-05 | Stop reason: SURG

## 2025-05-05 RX ORDER — NALOXONE HCL 0.4 MG/ML
0.04 VIAL (ML) INJECTION AS NEEDED
Status: DISCONTINUED | OUTPATIENT
Start: 2025-05-05 | End: 2025-05-05 | Stop reason: HOSPADM

## 2025-05-05 RX ORDER — PRAVASTATIN SODIUM 20 MG
40 TABLET ORAL NIGHTLY
Status: DISCONTINUED | OUTPATIENT
Start: 2025-05-05 | End: 2025-05-06 | Stop reason: HOSPADM

## 2025-05-05 RX ORDER — PSEUDOEPHEDRINE HCL 30 MG/1
60 TABLET, FILM COATED ORAL EVERY 4 HOURS PRN
Status: DISCONTINUED | OUTPATIENT
Start: 2025-05-05 | End: 2025-05-06 | Stop reason: HOSPADM

## 2025-05-05 RX ORDER — LISINOPRIL 20 MG/1
20 TABLET ORAL 2 TIMES DAILY
Status: DISCONTINUED | OUTPATIENT
Start: 2025-05-05 | End: 2025-05-06 | Stop reason: HOSPADM

## 2025-05-05 RX ORDER — SODIUM CHLORIDE 9 MG/ML
40 INJECTION, SOLUTION INTRAVENOUS AS NEEDED
Status: DISCONTINUED | OUTPATIENT
Start: 2025-05-05 | End: 2025-05-06 | Stop reason: HOSPADM

## 2025-05-05 RX ORDER — ONDANSETRON 2 MG/ML
4 INJECTION INTRAMUSCULAR; INTRAVENOUS EVERY 6 HOURS PRN
Status: DISCONTINUED | OUTPATIENT
Start: 2025-05-05 | End: 2025-05-06 | Stop reason: HOSPADM

## 2025-05-05 RX ORDER — HYDROMORPHONE HYDROCHLORIDE 1 MG/ML
0.5 INJECTION, SOLUTION INTRAMUSCULAR; INTRAVENOUS; SUBCUTANEOUS
Status: DISCONTINUED | OUTPATIENT
Start: 2025-05-05 | End: 2025-05-05 | Stop reason: HOSPADM

## 2025-05-05 RX ORDER — LIDOCAINE HYDROCHLORIDE 10 MG/ML
0.5 INJECTION, SOLUTION EPIDURAL; INFILTRATION; INTRACAUDAL; PERINEURAL ONCE AS NEEDED
Status: DISCONTINUED | OUTPATIENT
Start: 2025-05-05 | End: 2025-05-05 | Stop reason: HOSPADM

## 2025-05-05 RX ORDER — FENTANYL CITRATE 50 UG/ML
25 INJECTION, SOLUTION INTRAMUSCULAR; INTRAVENOUS
Status: DISCONTINUED | OUTPATIENT
Start: 2025-05-05 | End: 2025-05-05 | Stop reason: HOSPADM

## 2025-05-05 RX ORDER — FLUMAZENIL 0.1 MG/ML
0.2 INJECTION INTRAVENOUS AS NEEDED
Status: DISCONTINUED | OUTPATIENT
Start: 2025-05-05 | End: 2025-05-05 | Stop reason: HOSPADM

## 2025-05-05 RX ORDER — NALOXONE HCL 0.4 MG/ML
0.4 VIAL (ML) INJECTION
Status: DISCONTINUED | OUTPATIENT
Start: 2025-05-05 | End: 2025-05-06 | Stop reason: HOSPADM

## 2025-05-05 RX ORDER — HYDRALAZINE HYDROCHLORIDE 20 MG/ML
10 INJECTION INTRAMUSCULAR; INTRAVENOUS EVERY 4 HOURS PRN
Status: DISCONTINUED | OUTPATIENT
Start: 2025-05-05 | End: 2025-05-06 | Stop reason: HOSPADM

## 2025-05-05 RX ORDER — EPHEDRINE SULFATE 50 MG/ML
INJECTION, SOLUTION INTRAVENOUS AS NEEDED
Status: DISCONTINUED | OUTPATIENT
Start: 2025-05-05 | End: 2025-05-05 | Stop reason: SURG

## 2025-05-05 RX ORDER — BUPIVACAINE HCL/0.9 % NACL/PF 0.125 %
PLASTIC BAG, INJECTION (ML) EPIDURAL AS NEEDED
Status: DISCONTINUED | OUTPATIENT
Start: 2025-05-05 | End: 2025-05-05 | Stop reason: SURG

## 2025-05-05 RX ORDER — MELOXICAM 7.5 MG/1
15 TABLET ORAL DAILY PRN
Status: DISCONTINUED | OUTPATIENT
Start: 2025-05-05 | End: 2025-05-06 | Stop reason: HOSPADM

## 2025-05-05 RX ORDER — LABETALOL HYDROCHLORIDE 5 MG/ML
5 INJECTION, SOLUTION INTRAVENOUS
Status: DISCONTINUED | OUTPATIENT
Start: 2025-05-05 | End: 2025-05-05 | Stop reason: HOSPADM

## 2025-05-05 RX ORDER — CLINDAMYCIN PHOSPHATE 900 MG/50ML
900 INJECTION, SOLUTION INTRAVENOUS ONCE
Status: COMPLETED | OUTPATIENT
Start: 2025-05-05 | End: 2025-05-05

## 2025-05-05 RX ORDER — HYDROCODONE BITARTRATE AND ACETAMINOPHEN 5; 325 MG/1; MG/1
1 TABLET ORAL EVERY 6 HOURS PRN
Status: DISCONTINUED | OUTPATIENT
Start: 2025-05-05 | End: 2025-05-06 | Stop reason: HOSPADM

## 2025-05-05 RX ORDER — PHENYLEPHRINE HCL IN 0.9% NACL 50MG/250ML
.5-3 PLASTIC BAG, INJECTION (ML) INTRAVENOUS
Status: DISCONTINUED | OUTPATIENT
Start: 2025-05-05 | End: 2025-05-05 | Stop reason: HOSPADM

## 2025-05-05 RX ORDER — SODIUM CHLORIDE 0.9 % (FLUSH) 0.9 %
10 SYRINGE (ML) INJECTION AS NEEDED
Status: DISCONTINUED | OUTPATIENT
Start: 2025-05-05 | End: 2025-05-05 | Stop reason: HOSPADM

## 2025-05-05 RX ORDER — IBUPROFEN 600 MG/1
600 TABLET, FILM COATED ORAL EVERY 6 HOURS PRN
Status: DISCONTINUED | OUTPATIENT
Start: 2025-05-05 | End: 2025-05-05 | Stop reason: HOSPADM

## 2025-05-05 RX ORDER — SODIUM CHLORIDE 0.9 % (FLUSH) 0.9 %
3 SYRINGE (ML) INJECTION AS NEEDED
Status: DISCONTINUED | OUTPATIENT
Start: 2025-05-05 | End: 2025-05-05 | Stop reason: HOSPADM

## 2025-05-05 RX ORDER — METOPROLOL SUCCINATE 25 MG/1
25 TABLET, EXTENDED RELEASE ORAL 2 TIMES DAILY
Status: DISCONTINUED | OUTPATIENT
Start: 2025-05-05 | End: 2025-05-06 | Stop reason: HOSPADM

## 2025-05-05 RX ORDER — PROPOFOL 10 MG/ML
VIAL (ML) INTRAVENOUS AS NEEDED
Status: DISCONTINUED | OUTPATIENT
Start: 2025-05-05 | End: 2025-05-05 | Stop reason: SURG

## 2025-05-05 RX ORDER — MULTIVITAMIN WITH IRON
1000 TABLET ORAL DAILY
Status: DISCONTINUED | OUTPATIENT
Start: 2025-05-06 | End: 2025-05-06 | Stop reason: HOSPADM

## 2025-05-05 RX ORDER — GABAPENTIN 300 MG/1
600 CAPSULE ORAL EVERY 8 HOURS SCHEDULED
Status: DISCONTINUED | OUTPATIENT
Start: 2025-05-05 | End: 2025-05-06 | Stop reason: HOSPADM

## 2025-05-05 RX ORDER — LIDOCAINE HYDROCHLORIDE 40 MG/ML
SOLUTION TOPICAL AS NEEDED
Status: DISCONTINUED | OUTPATIENT
Start: 2025-05-05 | End: 2025-05-05 | Stop reason: SURG

## 2025-05-05 RX ORDER — ONDANSETRON 2 MG/ML
INJECTION INTRAMUSCULAR; INTRAVENOUS AS NEEDED
Status: DISCONTINUED | OUTPATIENT
Start: 2025-05-05 | End: 2025-05-05 | Stop reason: SURG

## 2025-05-05 RX ORDER — SODIUM CHLORIDE 0.9 % (FLUSH) 0.9 %
10 SYRINGE (ML) INJECTION EVERY 12 HOURS SCHEDULED
Status: DISCONTINUED | OUTPATIENT
Start: 2025-05-05 | End: 2025-05-06 | Stop reason: HOSPADM

## 2025-05-05 RX ORDER — ROCURONIUM BROMIDE 10 MG/ML
INJECTION, SOLUTION INTRAVENOUS AS NEEDED
Status: DISCONTINUED | OUTPATIENT
Start: 2025-05-05 | End: 2025-05-05 | Stop reason: SURG

## 2025-05-05 RX ORDER — OXYCODONE AND ACETAMINOPHEN 10; 325 MG/1; MG/1
1 TABLET ORAL EVERY 4 HOURS PRN
Status: DISCONTINUED | OUTPATIENT
Start: 2025-05-05 | End: 2025-05-05 | Stop reason: HOSPADM

## 2025-05-05 RX ORDER — OXYCODONE AND ACETAMINOPHEN 7.5; 325 MG/1; MG/1
1 TABLET ORAL 4 TIMES DAILY PRN
Status: DISCONTINUED | OUTPATIENT
Start: 2025-05-05 | End: 2025-05-06 | Stop reason: HOSPADM

## 2025-05-05 RX ORDER — ONDANSETRON 4 MG/1
4 TABLET, ORALLY DISINTEGRATING ORAL EVERY 6 HOURS PRN
Status: DISCONTINUED | OUTPATIENT
Start: 2025-05-05 | End: 2025-05-06 | Stop reason: HOSPADM

## 2025-05-05 RX ORDER — CLINDAMYCIN PHOSPHATE 900 MG/50ML
900 INJECTION, SOLUTION INTRAVENOUS EVERY 8 HOURS
Status: COMPLETED | OUTPATIENT
Start: 2025-05-05 | End: 2025-05-06

## 2025-05-05 RX ORDER — SODIUM CHLORIDE 0.9 % (FLUSH) 0.9 %
10 SYRINGE (ML) INJECTION EVERY 12 HOURS SCHEDULED
Status: DISCONTINUED | OUTPATIENT
Start: 2025-05-05 | End: 2025-05-05 | Stop reason: HOSPADM

## 2025-05-05 RX ORDER — ONDANSETRON 2 MG/ML
4 INJECTION INTRAMUSCULAR; INTRAVENOUS
Status: DISCONTINUED | OUTPATIENT
Start: 2025-05-05 | End: 2025-05-05 | Stop reason: HOSPADM

## 2025-05-05 RX ORDER — BUPIVACAINE HYDROCHLORIDE 5 MG/ML
INJECTION, SOLUTION PERINEURAL AS NEEDED
Status: DISCONTINUED | OUTPATIENT
Start: 2025-05-05 | End: 2025-05-05 | Stop reason: HOSPADM

## 2025-05-05 RX ORDER — PROTAMINE SULFATE 10 MG/ML
INJECTION, SOLUTION INTRAVENOUS AS NEEDED
Status: DISCONTINUED | OUTPATIENT
Start: 2025-05-05 | End: 2025-05-05 | Stop reason: SURG

## 2025-05-05 RX ORDER — ACETAMINOPHEN 160 MG/5ML
650 SOLUTION ORAL EVERY 8 HOURS
Status: DISCONTINUED | OUTPATIENT
Start: 2025-05-05 | End: 2025-05-06 | Stop reason: HOSPADM

## 2025-05-05 RX ORDER — ASPIRIN 81 MG/1
81 TABLET ORAL DAILY
Status: DISCONTINUED | OUTPATIENT
Start: 2025-05-06 | End: 2025-05-06 | Stop reason: HOSPADM

## 2025-05-05 RX ORDER — LIDOCAINE HYDROCHLORIDE 20 MG/ML
INJECTION, SOLUTION EPIDURAL; INFILTRATION; INTRACAUDAL; PERINEURAL AS NEEDED
Status: DISCONTINUED | OUTPATIENT
Start: 2025-05-05 | End: 2025-05-05 | Stop reason: SURG

## 2025-05-05 RX ORDER — DEXTROSE MONOHYDRATE 25 G/50ML
12.5 INJECTION, SOLUTION INTRAVENOUS AS NEEDED
Status: DISCONTINUED | OUTPATIENT
Start: 2025-05-05 | End: 2025-05-05 | Stop reason: HOSPADM

## 2025-05-05 RX ORDER — SODIUM CHLORIDE 0.9 % (FLUSH) 0.9 %
10 SYRINGE (ML) INJECTION AS NEEDED
Status: DISCONTINUED | OUTPATIENT
Start: 2025-05-05 | End: 2025-05-06 | Stop reason: HOSPADM

## 2025-05-05 RX ORDER — CLOPIDOGREL BISULFATE 75 MG/1
75 TABLET ORAL DAILY
Status: DISCONTINUED | OUTPATIENT
Start: 2025-05-06 | End: 2025-05-06 | Stop reason: HOSPADM

## 2025-05-05 RX ORDER — ACETAMINOPHEN 650 MG/1
650 SUPPOSITORY RECTAL EVERY 8 HOURS
Status: DISCONTINUED | OUTPATIENT
Start: 2025-05-05 | End: 2025-05-06 | Stop reason: HOSPADM

## 2025-05-05 RX ORDER — LABETALOL HYDROCHLORIDE 5 MG/ML
10 INJECTION, SOLUTION INTRAVENOUS
Status: DISCONTINUED | OUTPATIENT
Start: 2025-05-05 | End: 2025-05-06 | Stop reason: HOSPADM

## 2025-05-05 RX ORDER — MORPHINE SULFATE 2 MG/ML
2 INJECTION, SOLUTION INTRAMUSCULAR; INTRAVENOUS
Status: DISCONTINUED | OUTPATIENT
Start: 2025-05-05 | End: 2025-05-06 | Stop reason: HOSPADM

## 2025-05-05 RX ADMIN — SUGAMMADEX 200 MG: 100 INJECTION, SOLUTION INTRAVENOUS at 14:59

## 2025-05-05 RX ADMIN — LIDOCAINE HYDROCHLORIDE 100 MG: 20 INJECTION, SOLUTION EPIDURAL; INFILTRATION; INTRACAUDAL; PERINEURAL at 14:25

## 2025-05-05 RX ADMIN — Medication 100 MCG: at 14:34

## 2025-05-05 RX ADMIN — PROPOFOL 150 MG: 10 INJECTION, EMULSION INTRAVENOUS at 14:25

## 2025-05-05 RX ADMIN — GABAPENTIN 600 MG: 300 CAPSULE ORAL at 22:04

## 2025-05-05 RX ADMIN — PRAVASTATIN SODIUM 40 MG: 20 TABLET ORAL at 20:21

## 2025-05-05 RX ADMIN — Medication 10 ML: at 20:21

## 2025-05-05 RX ADMIN — LIDOCAINE HYDROCHLORIDE 1 EACH: 40 SOLUTION TOPICAL at 14:25

## 2025-05-05 RX ADMIN — SODIUM CHLORIDE, POTASSIUM CHLORIDE, SODIUM LACTATE AND CALCIUM CHLORIDE 1000 ML: 600; 310; 30; 20 INJECTION, SOLUTION INTRAVENOUS at 11:00

## 2025-05-05 RX ADMIN — CLINDAMYCIN PHOSPHATE 900 MG: 900 INJECTION, SOLUTION INTRAVENOUS at 14:28

## 2025-05-05 RX ADMIN — OXYCODONE AND ACETAMINOPHEN 1 TABLET: 325; 10 TABLET ORAL at 15:39

## 2025-05-05 RX ADMIN — HEPARIN SODIUM 9000 UNITS: 1000 INJECTION, SOLUTION INTRAVENOUS; SUBCUTANEOUS at 14:36

## 2025-05-05 RX ADMIN — ONDANSETRON 4 MG: 2 INJECTION INTRAMUSCULAR; INTRAVENOUS at 15:02

## 2025-05-05 RX ADMIN — EPHEDRINE SULFATE 10 MG: 50 INJECTION INTRAVENOUS at 14:36

## 2025-05-05 RX ADMIN — HYDROCODONE BITARTRATE AND ACETAMINOPHEN 1 TABLET: 5; 325 TABLET ORAL at 17:28

## 2025-05-05 RX ADMIN — HEPARIN SODIUM 1000 UNITS: 1000 INJECTION, SOLUTION INTRAVENOUS; SUBCUTANEOUS at 14:46

## 2025-05-05 RX ADMIN — PHENYLEPHRINE HYDROCHLORIDE 0.5 MCG/KG/MIN: 10 INJECTION INTRAVENOUS at 14:26

## 2025-05-05 RX ADMIN — PSEUDOEPHEDRINE HCL 60 MG: 30 TABLET, FILM COATED ORAL at 20:21

## 2025-05-05 RX ADMIN — Medication 100 MCG: at 14:29

## 2025-05-05 RX ADMIN — CLINDAMYCIN PHOSPHATE 900 MG: 900 INJECTION, SOLUTION INTRAVENOUS at 22:04

## 2025-05-05 RX ADMIN — PROTAMINE SULFATE 30 MG: 10 INJECTION, SOLUTION INTRAVENOUS at 14:55

## 2025-05-05 RX ADMIN — ROCURONIUM 50 MG: 50 INJECTION, SOLUTION INTRAVENOUS at 14:25

## 2025-05-05 NOTE — ANESTHESIA POSTPROCEDURE EVALUATION
Patient: Bryant Delgado    Procedure Summary       Date: 05/05/25 Room / Location:  [facility] OR  /  [facility]    Anesthesia Start: 1418 Anesthesia Stop: 1515    Procedure: RIGHT TRANSCAROTID ARTERY REVASCULARIZATION (Neck) Diagnosis:       Stenosis of right carotid artery      Preop testing      (Stenosis of right carotid artery [I65.21])      (Preop testing [Z01.818])    Surgeons: Jean Concepcion DO Provider: Jc Farrar CRNA    Anesthesia Type: general, Amy ASA Status: 3            Anesthesia Type: general, [place]    Vitals  Vitals Value Taken Time   /66 05/05/25 15:55   Temp 97 °F (36.1 °C) 05/05/25 15:13   Pulse 52 05/05/25 15:55   Resp 10 05/05/25 15:13   SpO2 100 % 05/05/25 15:55   Vitals shown include unfiled device data.        Post Anesthesia Care and Evaluation    Patient location during evaluation: PACU  Patient participation: complete - patient participated  Level of consciousness: awake and awake and alert  Pain score: 0  Pain management: adequate    Airway patency: patent  Anesthetic complications: No anesthetic complications  PONV Status: none  Cardiovascular status: acceptable  Respiratory status: acceptable  Hydration status: acceptable    Comments: Patient discharged according to acceptable Kaitlyn score per RN assessment. See nursing records for further information.     Blood pressure 114/67, pulse 52, temperature 97 °F (36.1 °C), temperature source Temporal, resp. rate 10, weight 70.3 kg (154 lb 15.7 oz), SpO2 98%.

## 2025-05-05 NOTE — ANESTHESIA PROCEDURE NOTES
Arterial Line      Patient location during procedure: holding area  Start time: 5/5/2025 1:40 PM  Stop Time:5/5/2025 1:43 PM       Line placed for hemodynamic monitoring.  Performed By   Anesthesiologist: Bill Gaxiola MD   Preanesthetic Checklist  Completed: patient identified, IV checked, site marked, risks and benefits discussed, surgical consent, monitors and equipment checked, pre-op evaluation and timeout performed  Arterial Line Prep    Sterile Tech: mask, cap and gloves  Prep: ChloraPrep  Patient monitoring: continuous pulse oximetry  Arterial Line Procedure   Laterality:right  Location:  radial artery  Catheter size: 20 G   Guidance: palpation technique  Number of attempts: 1  Successful placement: yes   Post Assessment   Dressing Type: occlusive dressing applied and secured with tape.   Complications no  Circ/Move/Sens Assessment: normal.   Patient Tolerance: patient tolerated the procedure well with no apparent complications

## 2025-05-05 NOTE — PLAN OF CARE
Goal Outcome Evaluation:  Plan of Care Reviewed With: patient        Progress: no change  Outcome Evaluation: Pt c/o chronic back pain while having to lay flat, PRN meds given as available; DTV; IID x2; neuros in tact; PPP; family at bedside; safety maintained.

## 2025-05-05 NOTE — ANESTHESIA PROCEDURE NOTES
Airway  Date/Time: 5/5/2025 2:25 PM  Airway not difficult    General Information and Staff    Patient location during procedure: OR  CRNA/CAA: Jc Farrar CRNA    Indications and Patient Condition  Indications for airway management: airway protection    Preoxygenated: yes    Mask difficulty assessment: 0 - not attempted    Final Airway Details    Final airway type: endotracheal airway      Successful airway: ETT  Cuffed: yes   Successful intubation technique: video laryngoscopy  Adjuncts used in placement: intubating stylet  Blade: Alvarado  Blade size: 3  ETT size (mm): 7.5  Cormack-Lehane Classification: grade I - full view of glottis  Placement verified by: chest auscultation and capnometry   Cuff volume (mL): 8  Measured from: gums  ETT/EBT to gums (cm): 22  Number of attempts at approach: 1  Assessment: lips, teeth, and gum same as pre-op and atraumatic intubation    Additional Comments  ATRAUMATIC INTUBATION

## 2025-05-05 NOTE — OP NOTE
"Bryant Delgado  5/5/2025     PREOPERATIVE DIAGNOSIS:   1.  Symptomatic right internal carotid artery stenosis  2.  Amaurosis fugax of the right eye     POSTOPERATIVE DIAGNOSIS: Same     PROCEDURE PERFORMED:   1.  Right internal carotid artery TCAR (transcarotid artery revascularization) with the ENROUTE transcarotid neuro protection and stent system     SURGEON: Jean Concepcion DO      ANESTHESIA: General    PREPARATION: Routine.    STAFF: Circulator: Ashley Lucero RN  Scrub Person: Cristin Mohamud; Luis E Silverio RN; Ayden Reis  Vendor Representative: James Greenberg  Assistant: Shaun De La Vega  Vascular Radiology Technician: Hyacinth Arnold    Estimated Blood Loss: minimal    SPECIMENS: None    COMPLICATIONS: None    INDICATIONS: Bryant Delgado is a 65 y.o. male who you are currently following for blurred vision to right eye. He had a \"black curtain\" over vision with complete loss and other episodes have been partial. He is maintained on aspirin and Pravachol. He had elevated CRP 11.32. ESR was normal. He did have a right temporal artery biopsy on 4/24/2025 which was negative. He did have a CTA of the neck performed today, which I did review in office. The indications, risks, and possible complications of the procedure were explained to the patient, who voiced understanding and wished to proceed with surgery.     PROCEDURE IN DETAIL: The patient was taken to the operating room and placed on the operating table in a supine position. After general anesthesia was obtained, the right neck and bilateral groins were prepped and draped in a sterile manner.   A 4 cm transverse incision was made between the sternal and clavicular heads of the sternocleidomastoid muscle below the omohyoid.  Following longitudinal division of the carotid sheath the jugular vein was partially dissected and retracted laterally.  Once 3 cm of common carotid artery were isolated, the vessel loop was placed around the proximal one " third of the common carotid artery under direct visualization.  A 5-0 Prolene suture was preplaced in the anterior wall the common carotid artery, in a Z stitch configuration, close to the clavicle to facilitate hemostasis upon removal of the arterial sheath at completion of the procedure.  The contralateral left common femoral vein was accessed under ultrasound guidance, using standard Seldinger and micropuncture access technique.  The venous return sheath was advanced into the common femoral vein over the advantage Glidewire.  Blood was aspirated from the flow line followed by flushing of the venous sheath with heparinized saline.  The venous sheath was secured to the patient's skin with a 2-0 silk suture to maintain optimal position in the vessel.  10,000 units of intravenous heparin was given to obtain a therapeutic ACT greater than 250 seconds prior to arterial access.  A 4 Greek micropuncture set was used, puncturing the artery with a 21-gauge needle through the preplaced Z stitch while holding gentle traction on the vessel loop to stabilize and centralize the common carotid artery within the incision.  Careful attention was paid to the change in common carotid shape when using the vessel loop to control or lift the artery.  The micropuncture wire was then advanced into the external carotid artery and the 21-gauge needle was removed.  The micropuncture sheath was advanced into the external carotid artery and the wire and dilator were removed.  Pulsatile backflow indicated correct positioning.  The J-wire was then inserted into the external carotid artery.  After micro puncture sheath removal, the transcarotid arterial sheath was advanced to the 2.5 cm marker and the J-wire and dilator were removed.    The arterial sheath position was assessed under fluoroscopy in 2 projections to ensure that the sheath tip was oriented coaxially in the common carotid artery.  The arterial sheath was sutured to the patient in  2 separate areas.  Blood was slowly aspirated followed by flushing with heparinized saline.  Next, the flow controller was connected to the transcarotid arterial sheath, prepared by passively allowing a column of arterial blood to fill the line and connected to the venous return sheath.  The common carotid inflow was occluded proximal to the arteriotomy with a profunda clamp to achieve active flow reversal.  To confirm flow reversal, a saline bolus was delivered in the venous flow line on both high and low flow settings of the flow controller.  Angiograms were performed with slow injections of a small amount of contrast filling just past the lesion to minimize antegrade transmission of microbubbles.  Prior to lesion manipulation, heart rate and blood pressure were managed upwards to optimize flow reversal and procedural neuro protection.  The lesion was crossed with an 014 guidewire and predilatation was performed with a 6 x 25 mm Enflate balloon.  Next, primary stenting was performed with the 9-7 x 30 mm ENROUTE transcarotid stent.  Imaging was performed in both in German and VALVERDE projections to ensure the stent was fully deployed.  Completion angiogram also showed the stent to be widely patent without any residual stenosis, dissection, or occlusion.  At TCAR case completion, antegrade flow was restored by releasing the clamp on the common carotid artery then closing the NPS stopcocks to the flow lines.  The transcarotid arterial sheath was removed and the preclosure suture was tied in place.  30 mg of protamine was given intravenously for reversal.  The venous return sheath from the left groin was removed and hemostasis was achieved with 10 to 15 minutes of manual compression.  The neck wound bed was irrigated with antibiotic saline and hemostasis was observed.  The 2 heads of the sternocleidomastoid were carefully sutured back together with a 3-0 Vicryl in a running fashion.  The platysma muscle was reapproximated  using a 3-0 Vicryl in a running fashion.  The skin was then reapproximated using a 4-0 Monocryl in a subcuticular fashion. Sterile dressings were applied. The patient tolerated the procedure well. Sponge and needle counts were correct. The patient was then awakened and extubated in the operating room and taken to the recovery room in good condition.    ICA stenosis: 90%  Stenosis after stent placement: 0%  Lesion length: 28 mm  Lesion calcification: Mild  Lesion at level: C2  Neuro: Intact  ICA tortuosity: Mild  Arch type: Type 1  Flow reversal time: 5 minutes  [place]: 0  Contrast amount: 12 mL  ACT: 239+1000 units of additional heparin  NIH: 0    Jean Concepcion,   Date: 5/5/2025 Time: 15:12 CDT    CC:Bennett Villanueva MD

## 2025-05-05 NOTE — ANESTHESIA PREPROCEDURE EVALUATION
Anesthesia Evaluation     Patient summary reviewed   no history of anesthetic complications:   NPO Solid Status: > 8 hours             Airway   Mallampati: II  Dental    (+) edentulous    Pulmonary    (-) COPD, asthma, sleep apnea, not a smoker  Cardiovascular   Exercise tolerance: good (4-7 METS)    (+) hypertension, hyperlipidemia,  carotid artery disease  (-) pacemaker, past MI, angina, cardiac stents      Neuro/Psych  (-) seizures, TIA, CVA  GI/Hepatic/Renal/Endo    (-) GERD, liver disease, no renal disease, diabetes    Musculoskeletal     Abdominal    Substance History      OB/GYN          Other                    Anesthesia Plan    ASA 3     general and [place]     intravenous induction     Anesthetic plan, risks, benefits, and alternatives have been provided, discussed and informed consent has been obtained with: patient.    CODE STATUS:

## 2025-05-06 ENCOUNTER — READMISSION MANAGEMENT (OUTPATIENT)
Dept: CALL CENTER | Facility: HOSPITAL | Age: 65
End: 2025-05-06
Payer: MEDICARE

## 2025-05-06 VITALS
HEART RATE: 48 BPM | DIASTOLIC BLOOD PRESSURE: 67 MMHG | HEIGHT: 64 IN | BODY MASS INDEX: 26.46 KG/M2 | WEIGHT: 154.98 LBS | SYSTOLIC BLOOD PRESSURE: 127 MMHG | OXYGEN SATURATION: 95 % | RESPIRATION RATE: 16 BRPM | TEMPERATURE: 98.2 F

## 2025-05-06 PROCEDURE — 25010000002 CLINDAMYCIN 900 MG/50ML SOLUTION: Performed by: SURGERY

## 2025-05-06 PROCEDURE — 99024 POSTOP FOLLOW-UP VISIT: CPT | Performed by: SURGERY

## 2025-05-06 RX ORDER — LISINOPRIL 20 MG/1
20 TABLET ORAL 2 TIMES DAILY
COMMUNITY
End: 2025-05-09 | Stop reason: SDUPTHER

## 2025-05-06 RX ORDER — HYDROCODONE BITARTRATE AND ACETAMINOPHEN 5; 325 MG/1; MG/1
1 TABLET ORAL EVERY 6 HOURS PRN
Qty: 10 TABLET | Refills: 0 | Status: SHIPPED | OUTPATIENT
Start: 2025-05-06

## 2025-05-06 RX ADMIN — ACETAMINOPHEN 650 MG: 325 TABLET, FILM COATED ORAL at 00:23

## 2025-05-06 RX ADMIN — CYANOCOBALAMIN TAB 500 MCG 1000 MCG: 500 TAB at 08:37

## 2025-05-06 RX ADMIN — PSEUDOEPHEDRINE HCL 60 MG: 30 TABLET, FILM COATED ORAL at 05:34

## 2025-05-06 RX ADMIN — CLINDAMYCIN PHOSPHATE 900 MG: 900 INJECTION, SOLUTION INTRAVENOUS at 05:34

## 2025-05-06 RX ADMIN — OXYCODONE HYDROCHLORIDE AND ACETAMINOPHEN 1 TABLET: 7.5; 325 TABLET ORAL at 08:40

## 2025-05-06 RX ADMIN — ASPIRIN 81 MG: 81 TABLET, COATED ORAL at 08:37

## 2025-05-06 RX ADMIN — Medication 5000 UNITS: at 08:37

## 2025-05-06 RX ADMIN — GABAPENTIN 600 MG: 300 CAPSULE ORAL at 05:34

## 2025-05-06 RX ADMIN — CLOPIDOGREL BISULFATE 75 MG: 75 TABLET, FILM COATED ORAL at 08:37

## 2025-05-06 RX ADMIN — PSEUDOEPHEDRINE HCL 60 MG: 30 TABLET, FILM COATED ORAL at 00:23

## 2025-05-06 NOTE — DISCHARGE SUMMARY
Date of Discharge:  5/6/2025    Discharge Diagnosis: Symptomatic stenosis of right carotid artery [I65.21]     Presenting Problem/History of Present Illness  Stenosis of right carotid artery [I65.21]  Preop testing [Z01.818]  Symptomatic stenosis of right carotid artery [I65.21]       Hospital Course  Patient is a 65 y.o. male presented for an elective right transcarotid artery revascularization for symptomatic right carotid stenosis.  He did undergo right TCAR without incident.  The patient was transferred to  for continued care.  Overnight, the patient has done well. Bryant Delgado  vitals have remained stable, right neck soft without hematoma, and without neurological deficit.  Groin soft without hematoma.  Bryant Delgado  is stable and ready for discharge.  We will see him back in 2 weeks for post operative follow up.  His medications will stay the same.  Written and verbal instructions were given.  This all was discussed in full with complete understanding.     Procedures Performed  Procedure(s):  RIGHT TRANSCAROTID ARTERY REVASCULARIZATION       Consults:   Consults       No orders found for last 30 day(s).              Condition on Discharge:  stable    Discharge Medications     Discharge Medications        New Medications        Instructions Start Date   HYDROcodone-acetaminophen 5-325 MG per tablet  Commonly known as: NORCO   1 tablet, Oral, Every 6 Hours PRN             Continue These Medications        Instructions Start Date   aspirin 81 MG EC tablet   81 mg, Oral, Daily      clopidogrel 75 MG tablet  Commonly known as: Plavix   75 mg, Oral, Daily      gabapentin 600 MG tablet  Commonly known as: NEURONTIN   3 Times Daily      lisinopril 20 MG tablet  Commonly known as: PRINIVIL,ZESTRIL   20 mg, Oral, 2 Times Daily      meloxicam 15 MG tablet  Commonly known as: MOBIC   15 mg, Daily PRN      metoprolol succinate XL 50 MG 24 hr tablet  Commonly known as: TOPROL-XL   25 mg, Oral, 2 Times Daily       oxyCODONE-acetaminophen 7.5-325 MG per tablet  Commonly known as: PERCOCET   1 tablet, 4 Times Daily PRN      pravastatin 40 MG tablet  Commonly known as: PRAVACHOL   40 mg, Oral, Nightly      Vitamin B 12 500 MCG tablet   1,000 mcg, Daily      vitamin D3 125 MCG (5000 UT) capsule capsule   5,000 Units, Daily               Discharge Diet:   Diet Instructions       Diet: Regular/House Diet; Regular (IDDSI 7); Thin (IDDSI 0)      Discharge Diet: Regular/House Diet    Texture: Regular (IDDSI 7)    Fluid Consistency: Thin (IDDSI 0)            Activity at Discharge:   Activity Instructions       Bathing Restrictions      Type of Restriction: Bathing    Bathing Restrictions: Other    Explain Bathing Restrictions: May shower tomorrow    Driving Restrictions      Type of Restriction: Driving    Driving Restrictions: No Driving (Time Limited)    Length: 1 Week    Lifting Restrictions      Type of Restriction: Lifting    Lifting Restrictions: Lifting Restriction (Indicate Limit)    Weight Limit (Pounds): 10    Length of Lifting Restriction: 1 week    Other Activity Restrictions      Type of Restriction: Other    Explain Other Restrictions: No bending, stooping, or straining.            Follow-up Appointments  Future Appointments   Date Time Provider Department Center   5/7/2025 10:30 AM Bennett Villanueva MD [facility]   5/21/2025 11:00 AM Mica Castro APRN [facility]   12/3/2025 10:30 AM Bennett Villanueva MD [facility]     Additional Instructions for the Follow-ups that You Need to Schedule       Discharge Follow-up with Specialty: Jamey; 2 Weeks   As directed      Specialty: Jamey   Follow Up: 2 Weeks   Follow Up Details: Postop TCAR                 I did spend more than 30 minutes reviewing the chart, face to face encounter, and organizing discharge.    Jean Concepcion DO  05/06/25  08:00 CDT

## 2025-05-06 NOTE — PAYOR COMM NOTE
"ADMIT INPT 5-5-25  [id number]   Danny Will Uribe (65 y.o. Male)       Date of Birth   1960    Social Security Number       Address   [address] [place] [address]    Home Phone       MRN   [id number]       [facility]    Marital Status                               Admission Date   5/5/2025    Admission Type   Elective    Admitting Provider   Jean Cnocepcion DO    Attending Provider   Jean Concepcion DO    Department, Room/Bed   [facility] 3C, 371/1       Discharge Date       Discharge Disposition       Discharge Destination                                 Attending Provider: Jean Concepcion DO    Allergies: Keflex [Cephalexin]    Isolation: None   Infection: None   Code Status: CPR    Ht: 161.5 cm (63.58\")   Wt: 70.3 kg (154 lb 15.7 oz)    Admission Cmt: None   Principal Problem: Symptomatic stenosis of right carotid artery [I65.21]                   Active Insurance as of 5/5/2025       Primary Coverage       Payor Plan Insurance Group Employer/Plan Group    HUMANA MEDICARE REPLACEMENT HUMANA MEDICARE ADVANTAGE PPO [id number]       Payor Plan Address Payor Plan Phone Number Payor Plan Fax Number Effective Dates    [address] [address] [phone]  1/1/2024 - None Entered    [facility] [id number]         Subscriber Name Subscriber Birth Date Member ID       WILL GIL 1960 [id number]                     Emergency Contacts        (Rel.) Home Phone Work Phone Mobile Phone    DannyShanta (Spouse) -- -- [phone]                 History & Physical        Jean Concepcion DO at 05/05/25 1049            H&P reviewed. The patient was examined and there are no changes to the H&P.          Electronically signed by Jean Concepcion DO at 05/05/25 1050   Source Note: H&P          5/3/2025           Tomas García MD  [address] [place] [address]     Will Uribe Danny  1960          Chief Complaint   Patient " "presents with    Post-op       1 week post op. Right temporal biopsy done 4/24/25. Testing also done today. Complains of forming rash on legs and arm after surgery, but faded away past few days.          Dear Tomas García MD        I had the pleasure of seeing your patient Bryant Delgado in the office today.    As you recall, Bryant Delgado is a 65 y.o.  male who you are currently following for blurred vision to right eye.  He had a \"black curtain\" over vision with complete loss and other episodes have been partial.  He is maintained on aspirin and Pravachol.  He had elevated CRP 11.32.  ESR was normal.  He did have a right temporal artery biopsy on 4/24/2025 which was negative.  He did have a CTA of the neck performed today, which I did review in office.    Past Medical History:   Diagnosis Date    Arthritis     Cataract     right eye    Diverticulitis     Hyperlipidemia     Hypertension     Neuropathy     RMSF (Bryce Mountain spotted fever) 08/2023    Sensorineural hearing loss (SNHL) of both ears 06/03/2020      Past Surgical History:   Procedure Laterality Date    CERVICAL FUSION      COLONOSCOPY  01/2023    LUMBAR LAMINECTOMY      TEMPORAL ARTERY BIOPSY Right 4/24/2025    Procedure: RIGHT TEMPORAL ARTERY BIOPSY;  Surgeon: Jean Concepcion DO;  Location: [facility] OR;  Service: Vascular;  Laterality: Right;     Social History     Socioeconomic History    Marital status:    Tobacco Use    Smoking status: Never    Smokeless tobacco: Current     Types: Snuff   Vaping Use    Vaping status: Never Used   Substance and Sexual Activity    Alcohol use: No    Drug use: No    Sexual activity: Defer     No family history on file.  Allergies   Allergen Reactions    Keflex [Cephalexin] Itching and Swelling     Current Outpatient Medications   Medication Instructions    aspirin 81 mg, Oral, Daily    clopidogrel (PLAVIX) 75 mg, Oral, Daily    gabapentin (NEURONTIN) 600 MG tablet 3 Times Daily    " "lisinopril (PRINIVIL,ZESTRIL) 20 mg, Oral, 2 Times Daily    meloxicam (MOBIC) 15 mg, Daily PRN    metoprolol succinate XL (TOPROL-XL) 25 mg, Oral, 2 Times Daily    oxyCODONE-acetaminophen (PERCOCET) 7.5-325 MG per tablet 1 tablet, 4 Times Daily PRN    pravastatin (PRAVACHOL) 40 mg, Oral, Nightly    Vitamin B 12 1,000 mcg, Daily    vitamin D3 5,000 Units, Daily        Review of Systems   Constitutional: Negative.    HENT: Negative.     Eyes:  Positive for visual disturbance.   Respiratory: Negative.     Cardiovascular: Negative.    Gastrointestinal: Negative.    Endocrine: Negative.    Genitourinary: Negative.    Musculoskeletal: Negative.    Skin: Negative.    Allergic/Immunologic: Negative.    Neurological: Negative.    Hematological: Negative.    Psychiatric/Behavioral: Negative.     All other systems reviewed and are negative.     /68   Pulse 55   Ht 165.1 cm (65\")   Wt 69.9 kg (154 lb)   SpO2 97%   BMI 25.63 kg/m²      Physical Exam  Vitals and nursing note reviewed.   Constitutional:       General: He is not in acute distress.     Appearance: Normal appearance. He is well-developed and normal weight. He is not diaphoretic.   HENT:      Head: Normocephalic and atraumatic.     Neck:      Vascular: No carotid bruit or JVD.   Cardiovascular:      Rate and Rhythm: Normal rate and regular rhythm.      Pulses: Normal pulses.           Femoral pulses are 2+ on the right side and 2+ on the left side.       Popliteal pulses are 2+ on the right side and 2+ on the left side.        Dorsalis pedis pulses are 2+ on the right side and 2+ on the left side.        Posterior tibial pulses are 2+ on the right side and 2+ on the left side.      Heart sounds: Normal heart sounds, S1 normal and S2 normal. No murmur heard.     No friction rub. No gallop.   Pulmonary:      Effort: Pulmonary effort is normal.      Breath sounds: Normal breath sounds.   Abdominal:      General: Bowel sounds are normal. There is no abdominal " bruit.      Palpations: Abdomen is soft.      Tenderness: There is no abdominal tenderness.   Musculoskeletal:         General: Normal range of motion.   Skin:     General: Skin is warm and dry.   Neurological:      General: No focal deficit present.      Mental Status: He is alert and oriented to person, place, and time.      Cranial Nerves: No cranial nerve deficit.   Psychiatric:         Mood and Affect: Mood normal.         Behavior: Behavior normal.         Thought Content: Thought content normal.         Judgment: Judgment normal.          Diagnostic data:  CT Angiogram Neck  Result Date: 5/1/2025  Narrative: CT ANGIOGRAM NECK- 5/1/2025 8:34 AM  HISTORY: Carotid artery stenosis  DOSE LENGTH PRODUCT: 228.32 mGy.cm. Automated exposure control was also utilized to decrease patient radiation dose.  EXAM: Axial CT angiogram of the neck after the uneventful administration of Isovue IV contrast. Sagittal and coronal reformations were also provided for review. 3D images were created on an independent workstation to better evaluate potential vascular abnormalities.  COMPARISON: Ultrasound dated 5/20/2024  FINDINGS:  The right brachiocephalic and left common carotid arteries arise from a common origin. The common carotid arteries are normal in caliber. Prominent soft plaque at the right ICA origin resulting in a 78% narrowing. There is only a calculated 5% atheromatous narrowing at the left ICA origin. Distal ICAs are patent. Patent vertebral arteries which are normal in caliber and codominant. Intracranial vertebral arteries and basilar artery are patent. The visualized intracranial contents are unremarkable. No cervical adenopathy. Prior ACDF. Included portions of the lung apices are clear.       Impression:  1.  Prominent soft plaque at the right ICA origin with calculated 78% narrowing. 2.  Only 5% atheromatous narrowing at the left ICA origin. 3.  Vertebral arteries are patent, normal in caliber and codominant.   This report was signed and finalized on 5/1/2025 11:17 AM by Dr Paul Rick.          Problem List       Patient Active Problem List   Diagnosis    Sensorineural hearing loss (SNHL) of both ears    Tinnitus of both ears    Auditory hallucinations    RMSF (Bryce Mountain spotted fever)    Amaurosis fugax of right eye    Bilateral carotid artery stenosis    Preop testing    Stenosis of right carotid artery              Diagnosis Plan   1. Stenosis of right carotid artery  CBC and Differential     Basic metabolic panel     APTT     Protime-INR     ECG 12 Lead     Case Request     clindamycin (CLEOCIN) 900 mg in dextrose (D5W) 5 % 100 mL IVPB     Case Request       2. Preop testing  CBC and Differential     Basic metabolic panel     APTT     Protime-INR     ECG 12 Lead     Case Request     clindamycin (CLEOCIN) 900 mg in dextrose (D5W) 5 % 100 mL IVPB     Case Request       3. Encounter for monitoring antiplatelet therapy  APTT                Plan: After thoroughly evaluating Bryant Delgado, I believe the best course of action is to proceed with right transcarotid artery revascularization.  I did review his testing and there is a significant 90% focal narrowing with soft plaque noted.  This is  what caused his amaurosis fugax.  His temporal artery biopsy was negative.  His incision has healed.  I have started him on Plavix in addition to aspirin and his statin.  He will begin taking the Plavix today and we will proceed with surgical intervention on Monday.  He does not have a cardiac history but given symptoms we will proceed with surgical intervention on Monday Without cardiac clearance.  He is to continue aspirin, Plavix, and Pravachol without interruption.  A formal shared decision making interaction with the patient was had during this discussion.  The shared decision making interaction included discussion of all treatment options including carotid endarterectomy, carotid artery stenting which does include TCAR,  "and optimal medical therapy.  Explanation of the risks/benefits for each option specific to the patient's clinical situation was also performed.  Integration of clinical guidelines which include patient comorbidities and concomitant treatments were discussed.  Lastly, the discussion and incorporation of the patient's personal preferences and priorities in choosing the treatment plan were also discussed.  Risks of transcarotid artery revascularization include, but are not limited to, bleeding, infection, vessel damage, nerve damage, MI, stroke, and death.  The patient understands these risks and would like to proceed with the procedure. NIHSS=0.  Patient is to continue taking their medications as previously discussed.   This was all discussed in full with complete understanding.  Thank you for allowing me to participate in the care of your patient.  Please do not hesitate to call with any questions or concerns.  We will keep you aware of any further encounters with Bryant Delgado.         Sincerely yours,           Jean CONRAD. DO Kay Concepcion Lathan Edwards, MD       Electronically signed by Jean Concepcion DO at 05/03/25 1027                 Jean Concepcion DO at 05/03/25 1026          5/3/2025           Tomas García MD  [address] [place] [address]     Bryant Delgado  1960          Chief Complaint   Patient presents with    Post-op       1 week post op. Right temporal biopsy done 4/24/25. Testing also done today. Complains of forming rash on legs and arm after surgery, but faded away past few days.          Dear Tomas García MD        I had the pleasure of seeing your patient Bryant Delgado in the office today.    As you recall, Bryant Delgado is a 65 y.o.  male who you are currently following for blurred vision to right eye.  He had a \"black curtain\" over vision with complete loss and other episodes have been partial.  He is maintained on aspirin and " Pravachol.  He had elevated CRP 11.32.  ESR was normal.  He did have a right temporal artery biopsy on 4/24/2025 which was negative.  He did have a CTA of the neck performed today, which I did review in office.    Past Medical History:   Diagnosis Date    Arthritis     Cataract     right eye    Diverticulitis     Hyperlipidemia     Hypertension     Neuropathy     RMSF (Bryce Mountain spotted fever) 08/2023    Sensorineural hearing loss (SNHL) of both ears 06/03/2020      Past Surgical History:   Procedure Laterality Date    CERVICAL FUSION      COLONOSCOPY  01/2023    LUMBAR LAMINECTOMY      TEMPORAL ARTERY BIOPSY Right 4/24/2025    Procedure: RIGHT TEMPORAL ARTERY BIOPSY;  Surgeon: Jean Concepcion DO;  Location: [facility] OR;  Service: Vascular;  Laterality: Right;     Social History     Socioeconomic History    Marital status:    Tobacco Use    Smoking status: Never    Smokeless tobacco: Current     Types: Snuff   Vaping Use    Vaping status: Never Used   Substance and Sexual Activity    Alcohol use: No    Drug use: No    Sexual activity: Defer     No family history on file.  Allergies   Allergen Reactions    Keflex [Cephalexin] Itching and Swelling     Current Outpatient Medications   Medication Instructions    aspirin 81 mg, Oral, Daily    clopidogrel (PLAVIX) 75 mg, Oral, Daily    gabapentin (NEURONTIN) 600 MG tablet 3 Times Daily    lisinopril (PRINIVIL,ZESTRIL) 20 mg, Oral, 2 Times Daily    meloxicam (MOBIC) 15 mg, Daily PRN    metoprolol succinate XL (TOPROL-XL) 25 mg, Oral, 2 Times Daily    oxyCODONE-acetaminophen (PERCOCET) 7.5-325 MG per tablet 1 tablet, 4 Times Daily PRN    pravastatin (PRAVACHOL) 40 mg, Oral, Nightly    Vitamin B 12 1,000 mcg, Daily    vitamin D3 5,000 Units, Daily        Review of Systems   Constitutional: Negative.    HENT: Negative.     Eyes:  Positive for visual disturbance.   Respiratory: Negative.     Cardiovascular: Negative.    Gastrointestinal: Negative.   "  Endocrine: Negative.    Genitourinary: Negative.    Musculoskeletal: Negative.    Skin: Negative.    Allergic/Immunologic: Negative.    Neurological: Negative.    Hematological: Negative.    Psychiatric/Behavioral: Negative.     All other systems reviewed and are negative.     /68   Pulse 55   Ht 165.1 cm (65\")   Wt 69.9 kg (154 lb)   SpO2 97%   BMI 25.63 kg/m²      Physical Exam  Vitals and nursing note reviewed.   Constitutional:       General: He is not in acute distress.     Appearance: Normal appearance. He is well-developed and normal weight. He is not diaphoretic.   HENT:      Head: Normocephalic and atraumatic.     Neck:      Vascular: No carotid bruit or JVD.   Cardiovascular:      Rate and Rhythm: Normal rate and regular rhythm.      Pulses: Normal pulses.           Femoral pulses are 2+ on the right side and 2+ on the left side.       Popliteal pulses are 2+ on the right side and 2+ on the left side.        Dorsalis pedis pulses are 2+ on the right side and 2+ on the left side.        Posterior tibial pulses are 2+ on the right side and 2+ on the left side.      Heart sounds: Normal heart sounds, S1 normal and S2 normal. No murmur heard.     No friction rub. No gallop.   Pulmonary:      Effort: Pulmonary effort is normal.      Breath sounds: Normal breath sounds.   Abdominal:      General: Bowel sounds are normal. There is no abdominal bruit.      Palpations: Abdomen is soft.      Tenderness: There is no abdominal tenderness.   Musculoskeletal:         General: Normal range of motion.   Skin:     General: Skin is warm and dry.   Neurological:      General: No focal deficit present.      Mental Status: He is alert and oriented to person, place, and time.      Cranial Nerves: No cranial nerve deficit.   Psychiatric:         Mood and Affect: Mood normal.         Behavior: Behavior normal.         Thought Content: Thought content normal.         Judgment: Judgment normal.          Diagnostic " data:  CT Angiogram Neck  Result Date: 5/1/2025  Narrative: CT ANGIOGRAM NECK- 5/1/2025 8:34 AM  HISTORY: Carotid artery stenosis  DOSE LENGTH PRODUCT: 228.32 mGy.cm. Automated exposure control was also utilized to decrease patient radiation dose.  EXAM: Axial CT angiogram of the neck after the uneventful administration of Isovue IV contrast. Sagittal and coronal reformations were also provided for review. 3D images were created on an independent workstation to better evaluate potential vascular abnormalities.  COMPARISON: Ultrasound dated 5/20/2024  FINDINGS:  The right brachiocephalic and left common carotid arteries arise from a common origin. The common carotid arteries are normal in caliber. Prominent soft plaque at the right ICA origin resulting in a 78% narrowing. There is only a calculated 5% atheromatous narrowing at the left ICA origin. Distal ICAs are patent. Patent vertebral arteries which are normal in caliber and codominant. Intracranial vertebral arteries and basilar artery are patent. The visualized intracranial contents are unremarkable. No cervical adenopathy. Prior ACDF. Included portions of the lung apices are clear.       Impression:  1.  Prominent soft plaque at the right ICA origin with calculated 78% narrowing. 2.  Only 5% atheromatous narrowing at the left ICA origin. 3.  Vertebral arteries are patent, normal in caliber and codominant.  This report was signed and finalized on 5/1/2025 11:17 AM by Dr Paul Rick.          Problem List       Patient Active Problem List   Diagnosis    Sensorineural hearing loss (SNHL) of both ears    Tinnitus of both ears    Auditory hallucinations    RMSF (Bryce Mountain spotted fever)    Amaurosis fugax of right eye    Bilateral carotid artery stenosis    Preop testing    Stenosis of right carotid artery              Diagnosis Plan   1. Stenosis of right carotid artery  CBC and Differential     Basic metabolic panel     APTT     Protime-INR     ECG 12 Lead      Case Request     clindamycin (CLEOCIN) 900 mg in dextrose (D5W) 5 % 100 mL IVPB     Case Request       2. Preop testing  CBC and Differential     Basic metabolic panel     APTT     Protime-INR     ECG 12 Lead     Case Request     clindamycin (CLEOCIN) 900 mg in dextrose (D5W) 5 % 100 mL IVPB     Case Request       3. Encounter for monitoring antiplatelet therapy  APTT                Plan: After thoroughly evaluating Bryant Delgado, I believe the best course of action is to proceed with right transcarotid artery revascularization.  I did review his testing and there is a significant 90% focal narrowing with soft plaque noted.  This is  what caused his amaurosis fugax.  His temporal artery biopsy was negative.  His incision has healed.  I have started him on Plavix in addition to aspirin and his statin.  He will begin taking the Plavix today and we will proceed with surgical intervention on Monday.  He does not have a cardiac history but given symptoms we will proceed with surgical intervention on Monday Without cardiac clearance.  He is to continue aspirin, Plavix, and Pravachol without interruption.  A formal shared decision making interaction with the patient was had during this discussion.  The shared decision making interaction included discussion of all treatment options including carotid endarterectomy, carotid artery stenting which does include TCAR, and optimal medical therapy.  Explanation of the risks/benefits for each option specific to the patient's clinical situation was also performed.  Integration of clinical guidelines which include patient comorbidities and concomitant treatments were discussed.  Lastly, the discussion and incorporation of the patient's personal preferences and priorities in choosing the treatment plan were also discussed.  Risks of transcarotid artery revascularization include, but are not limited to, bleeding, infection, vessel damage, nerve damage, MI, stroke, and death.   "The patient understands these risks and would like to proceed with the procedure. NIHSS=0.  Patient is to continue taking their medications as previously discussed.   This was all discussed in full with complete understanding.  Thank you for allowing me to participate in the care of your patient.  Please do not hesitate to call with any questions or concerns.  We will keep you aware of any further encounters with Bryant Delgado.         Sincerely yours,           DO Kay Esteves Lathan Edwards, MD       Electronically signed by Jean Concepcion DO at 05/03/25 1027          Operative/Procedure Notes (all)        Jean Concepcion DO at 05/05/25 1438  Version 1 of 1         Bryant Delgado  5/5/2025     PREOPERATIVE DIAGNOSIS:   1.  Symptomatic right internal carotid artery stenosis  2.  Amaurosis fugax of the right eye     POSTOPERATIVE DIAGNOSIS: Same     PROCEDURE PERFORMED:   1.  Right internal carotid artery TCAR (transcarotid artery revascularization) with the ENROUTE transcarotid neuro protection and stent system     SURGEON: Jean Concepcion DO      ANESTHESIA: General    PREPARATION: Routine.    STAFF: Circulator: Ashley Lucero RN  Scrub Person: Cristin Mohamud; Luis E Silverio RN; Ayden Reis  Vendor Representative: James Greenberg  Assistant: Shaun De La Vega  Vascular Radiology Technician: Hyacinth Arnold    Estimated Blood Loss: minimal    SPECIMENS: None    COMPLICATIONS: None    INDICATIONS: Bryant Delgado is a 65 y.o. male who you are currently following for blurred vision to right eye. He had a \"black curtain\" over vision with complete loss and other episodes have been partial. He is maintained on aspirin and Pravachol. He had elevated CRP 11.32. ESR was normal. He did have a right temporal artery biopsy on 4/24/2025 which was negative. He did have a CTA of the neck performed today, which I did review in office. The indications, risks, and possible complications of " the procedure were explained to the patient, who voiced understanding and wished to proceed with surgery.     PROCEDURE IN DETAIL: The patient was taken to the operating room and placed on the operating table in a supine position. After general anesthesia was obtained, the right neck and bilateral groins were prepped and draped in a sterile manner.   A 4 cm transverse incision was made between the sternal and clavicular heads of the sternocleidomastoid muscle below the omohyoid.  Following longitudinal division of the carotid sheath the jugular vein was partially dissected and retracted laterally.  Once 3 cm of common carotid artery were isolated, the vessel loop was placed around the proximal one third of the common carotid artery under direct visualization.  A 5-0 Prolene suture was preplaced in the anterior wall the common carotid artery, in a Z stitch configuration, close to the clavicle to facilitate hemostasis upon removal of the arterial sheath at completion of the procedure.  The contralateral left common femoral vein was accessed under ultrasound guidance, using standard Seldinger and micropuncture access technique.  The venous return sheath was advanced into the common femoral vein over the advantage Glidewire.  Blood was aspirated from the flow line followed by flushing of the venous sheath with heparinized saline.  The venous sheath was secured to the patient's skin with a 2-0 silk suture to maintain optimal position in the vessel.  10,000 units of intravenous heparin was given to obtain a therapeutic ACT greater than 250 seconds prior to arterial access.  A 4 Icelandic micropuncture set was used, puncturing the artery with a 21-gauge needle through the preplaced Z stitch while holding gentle traction on the vessel loop to stabilize and centralize the common carotid artery within the incision.  Careful attention was paid to the change in common carotid shape when using the vessel loop to control or lift the  artery.  The micropuncture wire was then advanced into the external carotid artery and the 21-gauge needle was removed.  The micropuncture sheath was advanced into the external carotid artery and the wire and dilator were removed.  Pulsatile backflow indicated correct positioning.  The J-wire was then inserted into the external carotid artery.  After micro puncture sheath removal, the transcarotid arterial sheath was advanced to the 2.5 cm marker and the J-wire and dilator were removed.    The arterial sheath position was assessed under fluoroscopy in 2 projections to ensure that the sheath tip was oriented coaxially in the common carotid artery.  The arterial sheath was sutured to the patient in 2 separate areas.  Blood was slowly aspirated followed by flushing with heparinized saline.  Next, the flow controller was connected to the transcarotid arterial sheath, prepared by passively allowing a column of arterial blood to fill the line and connected to the venous return sheath.  The common carotid inflow was occluded proximal to the arteriotomy with a profunda clamp to achieve active flow reversal.  To confirm flow reversal, a saline bolus was delivered in the venous flow line on both high and low flow settings of the flow controller.  Angiograms were performed with slow injections of a small amount of contrast filling just past the lesion to minimize antegrade transmission of microbubbles.  Prior to lesion manipulation, heart rate and blood pressure were managed upwards to optimize flow reversal and procedural neuro protection.  The lesion was crossed with an 014 guidewire and predilatation was performed with a 6 x 25 mm Enflate balloon.  Next, primary stenting was performed with the 9-7 x 30 mm ENROUTE transcarotid stent.  Imaging was performed in both in VIANNEY and VALVERDE projections to ensure the stent was fully deployed.  Completion angiogram also showed the stent to be widely patent without any residual stenosis,  dissection, or occlusion.  At TCAR case completion, antegrade flow was restored by releasing the clamp on the common carotid artery then closing the NPS stopcocks to the flow lines.  The transcarotid arterial sheath was removed and the preclosure suture was tied in place.  30 mg of protamine was given intravenously for reversal.  The venous return sheath from the left groin was removed and hemostasis was achieved with 10 to 15 minutes of manual compression.  The neck wound bed was irrigated with antibiotic saline and hemostasis was observed.  The 2 heads of the sternocleidomastoid were carefully sutured back together with a 3-0 Vicryl in a running fashion.  The platysma muscle was reapproximated using a 3-0 Vicryl in a running fashion.  The skin was then reapproximated using a 4-0 Monocryl in a subcuticular fashion. Sterile dressings were applied. The patient tolerated the procedure well. Sponge and needle counts were correct. The patient was then awakened and extubated in the operating room and taken to the recovery room in good condition.    ICA stenosis: 90%  Stenosis after stent placement: 0%  Lesion length: 28 mm  Lesion calcification: Mild  Lesion at level: C2  Neuro: Intact  ICA tortuosity: Mild  Arch type: Type 1  Flow reversal time: 5 minutes  Elba: 0  Contrast amount: 12 mL  ACT: 239+1000 units of additional heparin  NIH: 0    Jean Concepcion DO  Date: 5/5/2025 Time: 15:12 CDT    CC:Bennett Villanueva MD     Electronically signed by Jean Concepcion DO at 05/05/25 3244

## 2025-05-06 NOTE — PLAN OF CARE
Goal Outcome Evaluation:  Plan of Care Reviewed With: patient, spouse        Progress: improving  Outcome Evaluation: A/Ox4, hypotension resolved, still bradycardic, vitals otherwise stable. incision site to R neck and L groin CAROL w/ steri strips, warm, soft. pulses palpable, neuros intact. c/o pain see MAR. discharge instructions given and reviewed with patient and spouse, Shanta, both verbalized understanding. pt is ready to discharge home with plans to followup w. PCP tomorrow, and vascular surgery in 2 weeks.

## 2025-05-06 NOTE — PLAN OF CARE
Goal Outcome Evaluation:  Plan of Care Reviewed With: patient, spouse        Progress: no change  Outcome Evaluation: R TCAR 5/5, cath sites soft and no drainage. Neuros intact. hypotensive and bradycardiac, MD aware, no new orders, pseuephridine given per orders. c/o back pain, see mar. SCDs, RA, A+OX4. voiding per urinal.. IV ABX per orders. call light within reach and safety maintained

## 2025-05-06 NOTE — OUTREACH NOTE
Prep Survey      Flowsheet Row Responses   [facility] patient discharged from? [place]   Is LACE score < 7 ? Yes   Eligibility [facility]   Date of Admission 05/05/25   Date of Discharge 05/06/25   Discharge Disposition Home or Self Care   Discharge diagnosis Symptomatic stenosis of right carotid artery, RIGHT TRANSCAROTID ARTERY   Does the patient have one of the following disease processes/diagnoses(primary or secondary)? General Surgery   Does the patient have Home health ordered? No   Is there a DME ordered? No   Prep survey completed? Yes            Clau WHITLEY - Registered Nurse

## 2025-05-07 ENCOUNTER — TRANSITIONAL CARE MANAGEMENT TELEPHONE ENCOUNTER (OUTPATIENT)
Dept: CALL CENTER | Facility: HOSPITAL | Age: 65
End: 2025-05-07
Payer: MEDICARE

## 2025-05-07 ENCOUNTER — OFFICE VISIT (OUTPATIENT)
Dept: FAMILY MEDICINE CLINIC | Facility: CLINIC | Age: 65
End: 2025-05-07
Payer: MEDICARE

## 2025-05-07 VITALS
HEART RATE: 54 BPM | SYSTOLIC BLOOD PRESSURE: 102 MMHG | WEIGHT: 157.6 LBS | DIASTOLIC BLOOD PRESSURE: 60 MMHG | RESPIRATION RATE: 18 BRPM | HEIGHT: 64 IN | TEMPERATURE: 97.4 F | OXYGEN SATURATION: 99 % | BODY MASS INDEX: 26.91 KG/M2

## 2025-05-07 DIAGNOSIS — N40.1 BENIGN PROSTATIC HYPERPLASIA WITH URINARY HESITANCY: ICD-10-CM

## 2025-05-07 DIAGNOSIS — R39.11 BENIGN PROSTATIC HYPERPLASIA WITH URINARY HESITANCY: ICD-10-CM

## 2025-05-07 DIAGNOSIS — E78.2 MIXED HYPERLIPIDEMIA: Primary | ICD-10-CM

## 2025-05-07 LAB
BILIRUB BLD-MCNC: NEGATIVE MG/DL
CLARITY, POC: CLEAR
COLOR UR: YELLOW
GLUCOSE UR STRIP-MCNC: NEGATIVE MG/DL
KETONES UR QL: NEGATIVE
LEUKOCYTE EST, POC: NEGATIVE
NITRITE UR-MCNC: NEGATIVE MG/ML
PH UR: 6.5 [PH] (ref 5–8)
PROT UR STRIP-MCNC: NEGATIVE MG/DL
RBC # UR STRIP: NEGATIVE /UL
SP GR UR: 1.01 (ref 1–1.03)
UROBILINOGEN UR QL: NORMAL

## 2025-05-07 NOTE — Clinical Note
May 7, 2025     Patient: Bryant Delgado   YOB: 1960   Date of Visit: 5/7/2025       To Whom It May Concern:    It is my medical opinion that Bryant Delgado may return to work in one day.            Sincerely,        Bennett Villanueva MD    CC: No Recipients

## 2025-05-07 NOTE — LETTER
May 7, 2025     Patient: Bryant Delgado   YOB: 1960   Date of Visit: 5/7/2025       To Whom It May Concern:    It is my medical opinion that Bryant Delgado should remain out of work until 05/23/2025 .           Sincerely,        Bennett Villanueva MD/marni

## 2025-05-07 NOTE — PAYOR COMM NOTE
"Ref:  [id number]   [facility]  FAX  [phone]      Will Gil (65 y.o. Male)       Date of Birth   1960    Social Security Number       Address   [address] [place] [address]    Home Phone       MRN   [id number]       Worship   [facility] [place]    Marital Status                               Admission Date   5/5/2025    Admission Type   Elective    Admitting Provider   Jean Concepcion DO    Attending Provider       Department, Room/Bed   [facility] 3C, 371/1       Discharge Date   5/6/2025    Discharge Disposition   Home or Self Care    Discharge Destination                                 Attending Provider: (none)   Allergies: Keflex [Cephalexin]    Isolation: None   Infection: None   Code Status: Prior    Ht: 161.5 cm (63.58\")   Wt: 70.3 kg (154 lb 15.7 oz)    Admission Cmt: None   Principal Problem: Symptomatic stenosis of right carotid artery [I65.21]                   Active Insurance as of 5/5/2025       Primary Coverage       Payor Plan Insurance Group Employer/Plan Group    HUMANA MEDICARE REPLACEMENT HU[employer]NA MEDICARE ADVANTAGE PPO [id number]       Payor Plan Address Payor Plan Phone Number Payor Plan Fax Number Effective Dates    [address] [address] [phone]  1/1/2024 - None Entered    [facility] [id number]         Subscriber Name Subscriber Birth Date Member ID       WILL GIL 1960 [id number]                     Emergency Contacts        (Rel.) Home Phone Work Phone Mobile Phone    Shanta Gil (Spouse) -- -- [phone]                 Discharge Summary        Jean Concepcion DO at 05/06/25 0753            Date of Discharge:  5/6/2025    Discharge Diagnosis: Symptomatic stenosis of right carotid artery [I65.21]     Presenting Problem/History of Present Illness  Stenosis of right carotid artery [I65.21]  Preop testing [Z01.818]  Symptomatic stenosis of right carotid artery [I65.21]       Hospital Course  Patient is " a 65 y.o. male presented for an elective right transcarotid artery revascularization for symptomatic right carotid stenosis.  He did undergo right TCAR without incident.  The patient was transferred to  for continued care.  Overnight, the patient has done well. Bryatn Delgado  vitals have remained stable, right neck soft without hematoma, and without neurological deficit.  Groin soft without hematoma.  Bryant Delgado  is stable and ready for discharge.  We will see him back in 2 weeks for post operative follow up.  His medications will stay the same.  Written and verbal instructions were given.  This all was discussed in full with complete understanding.     Procedures Performed  Procedure(s):  RIGHT TRANSCAROTID ARTERY REVASCULARIZATION       Consults:   Consults       No orders found for last 30 day(s).              Condition on Discharge:  stable    Discharge Medications     Discharge Medications        New Medications        Instructions Start Date   HYDROcodone-acetaminophen 5-325 MG per tablet  Commonly known as: NORCO   1 tablet, Oral, Every 6 Hours PRN             Continue These Medications        Instructions Start Date   aspirin 81 MG EC tablet   81 mg, Oral, Daily      clopidogrel 75 MG tablet  Commonly known as: Plavix   75 mg, Oral, Daily      gabapentin 600 MG tablet  Commonly known as: NEURONTIN   3 Times Daily      lisinopril 20 MG tablet  Commonly known as: PRINIVIL,ZESTRIL   20 mg, Oral, 2 Times Daily      meloxicam 15 MG tablet  Commonly known as: MOBIC   15 mg, Daily PRN      metoprolol succinate XL 50 MG 24 hr tablet  Commonly known as: TOPROL-XL   25 mg, Oral, 2 Times Daily      oxyCODONE-acetaminophen 7.5-325 MG per tablet  Commonly known as: PERCOCET   1 tablet, 4 Times Daily PRN      pravastatin 40 MG tablet  Commonly known as: PRAVACHOL   40 mg, Oral, Nightly      Vitamin B 12 500 MCG tablet   1,000 mcg, Daily      vitamin D3 125 MCG (5000 UT) capsule capsule   5,000 Units, Daily                Discharge Diet:   Diet Instructions       Diet: Regular/House Diet; Regular (IDDSI 7); Thin (IDDSI 0)      Discharge Diet: Regular/House Diet    Texture: Regular (IDDSI 7)    Fluid Consistency: Thin (IDDSI 0)            Activity at Discharge:   Activity Instructions       Bathing Restrictions      Type of Restriction: Bathing    Bathing Restrictions: Other    Explain Bathing Restrictions: May shower tomorrow    Driving Restrictions      Type of Restriction: Driving    Driving Restrictions: No Driving (Time Limited)    Length: 1 Week    Lifting Restrictions      Type of Restriction: Lifting    Lifting Restrictions: Lifting Restriction (Indicate Limit)    Weight Limit (Pounds): 10    Length of Lifting Restriction: 1 week    Other Activity Restrictions      Type of Restriction: Other    Explain Other Restrictions: No bending, stooping, or straining.            Follow-up Appointments  Future Appointments   Date Time Provider Department [place]   5/7/2025 10:30 AM Bennett Villanueva MD [facility]   5/21/2025 11:00 AM Mica Castro APRN [facility]   12/3/2025 10:30 AM Bennett Villanueva MD [facility]     Additional Instructions for the Follow-ups that You Need to Schedule       Discharge Follow-up with Specialty: [facility]; 2 Weeks   As directed      Specialty: [facility]   Follow Up: 2 Weeks   Follow Up Details: Postop TCAR                 I did spend more than 30 minutes reviewing the chart, face to face encounter, and organizing discharge.    Jean Concepcion DO  05/06/25  08:00 CDT        Electronically signed by Jean Concepcion DO at 05/06/25 0800

## 2025-05-07 NOTE — PROGRESS NOTES
Subjective   Bryant Delgado is a 65 y.o. male.     History of Present Illness  65-year-old male with recent right carotid endarterectomy      The following portions of the patient's history were reviewed and updated as appropriate: allergies, current medications, past family history, past medical history, past social history, past surgical history, and problem list.    Review of Systems   Eyes:  Positive for photophobia.   Respiratory:  Negative for shortness of breath.    Cardiovascular:  Negative for chest pain and leg swelling.        Right carotid endarterectomy for significant obstruction-6 days postop   Genitourinary:  Positive for difficulty urinating.       Objective   Physical Exam  Vitals and nursing note reviewed.   Constitutional:       Appearance: Normal appearance.   Eyes:      Extraocular Movements: Extraocular movements intact.      Pupils: Pupils are equal, round, and reactive to light.   Cardiovascular:      Rate and Rhythm: Normal rate and regular rhythm.   Pulmonary:      Effort: Pulmonary effort is normal.      Breath sounds: Normal breath sounds.   Musculoskeletal:      Right lower leg: No edema.      Left lower leg: No edema.   Skin:     General: Skin is warm and dry.   Neurological:      General: No focal deficit present.      Mental Status: He is alert and oriented to person, place, and time.   Psychiatric:         Mood and Affect: Mood normal.         Behavior: Behavior normal.         Thought Content: Thought content normal.         Judgment: Judgment normal.         Assessment & Plan   Diagnoses and all orders for this visit:    1. Mixed hyperlipidemia (Primary)  -     Comprehensive Metabolic Panel  -     Lipid Panel    2. Benign prostatic hyperplasia with urinary hesitancy  -     POC Urinalysis Dipstick, Multipro       Plan above-Metroprolol 50 half nightly plus lisinopril 20 if BP is more than 140

## 2025-05-07 NOTE — OUTREACH NOTE
Call Center TCM Note      Flowsheet Row Responses   [facility] patient discharged from? [place]   Does the patient have one of the following disease processes/diagnoses(primary or secondary)? General Surgery   TCM attempt successful? Yes   Discharge diagnosis Symptomatic stenosis of right carotid artery, RIGHT TRANSCAROTID ARTERY   TCM call completed? Yes   Wrap up additional comments Patient has a completed hospital follow up appt with PCP documented for today (5/7).  This fulfills TCM requirements and no phone calls is needed.   Would this patient benefit from a Referral to Amb Social Work? No   Is the patient interested in additional calls from an ambulatory ? No            Luisa CROCKER - Registered Nurse    5/7/2025, 11:41 CDT

## 2025-05-08 LAB
ALBUMIN SERPL-MCNC: 4 G/DL (ref 3.5–5.2)
ALBUMIN/GLOB SERPL: 2 G/DL
ALP SERPL-CCNC: 55 U/L (ref 39–117)
ALT SERPL-CCNC: 12 U/L (ref 1–41)
AST SERPL-CCNC: 21 U/L (ref 1–40)
BILIRUB SERPL-MCNC: 0.4 MG/DL (ref 0–1.2)
BUN SERPL-MCNC: 8 MG/DL (ref 8–23)
BUN/CREAT SERPL: 8.6 (ref 7–25)
CALCIUM SERPL-MCNC: 9.6 MG/DL (ref 8.6–10.5)
CHLORIDE SERPL-SCNC: 104 MMOL/L (ref 98–107)
CHOLEST SERPL-MCNC: 152 MG/DL (ref 0–200)
CO2 SERPL-SCNC: 28.9 MMOL/L (ref 22–29)
CREAT SERPL-MCNC: 0.93 MG/DL (ref 0.76–1.27)
EGFRCR SERPLBLD CKD-EPI 2021: 91.1 ML/MIN/1.73
GLOBULIN SER CALC-MCNC: 2 GM/DL
GLUCOSE SERPL-MCNC: 82 MG/DL (ref 65–99)
HDLC SERPL-MCNC: 50 MG/DL (ref 40–60)
LDLC SERPL CALC-MCNC: 83 MG/DL (ref 0–100)
POTASSIUM SERPL-SCNC: 4.5 MMOL/L (ref 3.5–5.2)
PROT SERPL-MCNC: 6 G/DL (ref 6–8.5)
SODIUM SERPL-SCNC: 141 MMOL/L (ref 136–145)
TRIGL SERPL-MCNC: 106 MG/DL (ref 0–150)
VLDLC SERPL CALC-MCNC: 19 MG/DL (ref 5–40)

## 2025-05-09 DIAGNOSIS — I10 ESSENTIAL (PRIMARY) HYPERTENSION: ICD-10-CM

## 2025-05-09 RX ORDER — LISINOPRIL 20 MG/1
20 TABLET ORAL 2 TIMES DAILY
Qty: 180 TABLET | Refills: 3 | Status: SHIPPED | OUTPATIENT
Start: 2025-05-09

## 2025-05-09 NOTE — TELEPHONE ENCOUNTER
Rx Refill Note  Requested Prescriptions     Pending Prescriptions Disp Refills    lisinopril (PRINIVIL,ZESTRIL) 20 MG tablet [Pharmacy Med Name: LISINOPRIL 20 MG TABLET] 180 tablet 3     Sig: TAKE ONE TABLET BY MOUTH TWO TIMES A DAY      Last office visit with prescribing clinician: 5/7/2025   Last telemedicine visit with prescribing clinician: Visit date not found   Next office visit with prescribing clinician: 8/7/2025       {TIP  Please add Last Relevant Lab 5/7/25    Cristin Ivy MA  05/09/25, 07:31 CDT

## 2025-05-21 ENCOUNTER — OFFICE VISIT (OUTPATIENT)
Dept: VASCULAR SURGERY | Facility: CLINIC | Age: 65
End: 2025-05-21
Payer: MEDICARE

## 2025-05-21 VITALS
WEIGHT: 155 LBS | OXYGEN SATURATION: 98 % | HEART RATE: 63 BPM | SYSTOLIC BLOOD PRESSURE: 138 MMHG | HEIGHT: 64 IN | BODY MASS INDEX: 26.46 KG/M2 | DIASTOLIC BLOOD PRESSURE: 80 MMHG

## 2025-05-21 DIAGNOSIS — I65.21 STENOSIS OF RIGHT CAROTID ARTERY: Primary | ICD-10-CM

## 2025-05-21 DIAGNOSIS — I65.23 BILATERAL CAROTID ARTERY STENOSIS: ICD-10-CM

## 2025-05-21 DIAGNOSIS — I10 PRIMARY HYPERTENSION: ICD-10-CM

## 2025-05-21 DIAGNOSIS — G45.3 AMAUROSIS FUGAX OF RIGHT EYE: ICD-10-CM

## 2025-05-21 NOTE — LETTER
"May 21, 2025     Bennett Villanueva MD  [address] [facility] [address]    Patient: Bryant Delgado   YOB: 1960   Date of Visit: 5/21/2025     Dear Bennett Villanueva MD:       Thank you for referring Bryant Delgado to me for evaluation. Below are the relevant portions of my assessment and plan of care.    If you have questions, please do not hesitate to call me. I look forward to following Bryant along with you.         Sincerely,        ALEJANDRO Lopez        CC: No Recipients    Mica Castro APRN  05/21/25 1719  Sign when Signing Visit  5/21/2025       Bennett Villanueva MD  [address] [facility] [address]      Bryant Delgado  1960    Chief Complaint   Patient presents with   • RIGHT TCAR POST OP FOLLOW UP     DENIES STROKE SYMPTOMS, AND BP HAS GONE DOWN       Dear Bennett Villanueva MD       I had the pleasure of seeing your patient Bryant Delgado in the office today.    As you recall, Bryant Delgado is a 65 y.o.  male who you are currently following for blurred vision to right eye.  He had a \"black curtain\" over vision with complete loss and other episodes have been partial.   He did have a right temporal artery biopsy on 4/24/2025 which was negative.  He was found to have significant right carotid stenosis and underwent a right TCAR on 5/5/2025.  He is doing well postoperatively his blood pressure is stable.  His right neck incision has healed as well as his left groin.  He is maintained on aspirin, Plavix, and Lipitor.      Review of Systems   Constitutional: Negative.    HENT: Negative.     Eyes:  Positive for visual disturbance.   Respiratory: Negative.     Cardiovascular: Negative.    Gastrointestinal: Negative.    Endocrine: Negative.    Genitourinary: Negative.    Musculoskeletal: Negative.    Skin: Negative.    Allergic/Immunologic: Negative.    Neurological: Negative.    Hematological: Negative.    Psychiatric/Behavioral: Negative.   " "  All other systems reviewed and are negative.    /80   Pulse 63   Ht 162.6 cm (64\")   Wt 70.3 kg (155 lb)   SpO2 98%   BMI 26.61 kg/m²     Physical Exam  Vitals and nursing note reviewed.   Constitutional:       General: He is not in acute distress.     Appearance: Normal appearance. He is well-developed. He is not diaphoretic.   HENT:      Head: Normocephalic and atraumatic.   Neck:      Vascular: No carotid bruit or JVD.      Comments: Right neck incision healed  Cardiovascular:      Rate and Rhythm: Normal rate and regular rhythm.      Pulses: Normal pulses.           Femoral pulses are 2+ on the right side and 2+ on the left side.       Popliteal pulses are 2+ on the right side and 2+ on the left side.        Dorsalis pedis pulses are 2+ on the right side and 2+ on the left side.        Posterior tibial pulses are 2+ on the right side and 2+ on the left side.      Heart sounds: Normal heart sounds, S1 normal and S2 normal. No murmur heard.     No friction rub. No gallop.      Comments: Left groin healed  Pulmonary:      Effort: Pulmonary effort is normal.      Breath sounds: Normal breath sounds.   Abdominal:      General: Bowel sounds are normal. There is no abdominal bruit.      Palpations: Abdomen is soft.      Tenderness: There is no abdominal tenderness.   Musculoskeletal:         General: Normal range of motion.   Skin:     General: Skin is warm and dry.   Neurological:      General: No focal deficit present.      Mental Status: He is alert and oriented to person, place, and time.      Cranial Nerves: No cranial nerve deficit.   Psychiatric:         Mood and Affect: Mood normal.         Behavior: Behavior normal.         Thought Content: Thought content normal.         Judgment: Judgment normal.          Patient Active Problem List   Diagnosis   • Sensorineural hearing loss (SNHL) of both ears   • Tinnitus of both ears   • Auditory hallucinations   • RMSF (Bryce Mountain spotted fever)   • " Amaurosis fugax of right eye   • Bilateral carotid artery stenosis   • Preop testing   • Symptomatic stenosis of right carotid artery   • Hypertension   • Hyperlipidemia        Diagnosis Plan   1. Stenosis of right carotid artery  US Carotid Bilateral      2. Amaurosis fugax of right eye        3. Bilateral carotid artery stenosis  US Carotid Bilateral      4. Primary hypertension                Plan: After thoroughly evaluating Bryant Delgado, I am pleased to report he is doing well status post right TCAR.  His right neck incision has healed as well as his left groin.  He has only been taking his lisinopril if blood pressure gets over 140.  He reports he is only had to take this once since surgery.  I encouraged him to continue to monitor as this may return back to baseline.  He is free to resume normal activity without restriction.  We will see him back in 6 months with repeat carotid duplex for continued surveillance.  I did discuss vascular risk factors as a pertains to the progression of vascular disease including controlling his hypertension.  His blood pressure stable currently.  He should continue his aspirin 81 mg daily, Plavix 75 mg daily and Lipitor 40 mg nightly in addition to his other medications.  This was all discussed in full with complete understanding.  Thank you for allowing me to participate in the care of your patient.  Please do not hesitate to call with any questions or concerns.  We will keep you aware of any further encounters with Bryant Delgado.        Sincerely yours,         ALEJANDRO Lopez, Bennett Caldwell MD

## 2025-05-21 NOTE — PROGRESS NOTES
"5/21/2025       Bennett Villanueva MD  [address] [facility] [address]      Bryant Delgado  1960    Chief Complaint   Patient presents with    RIGHT TCAR POST OP FOLLOW UP     DENIES STROKE SYMPTOMS, AND BP HAS GONE DOWN       Dear Bennett Villanueva MD       I had the pleasure of seeing your patient Bryant Delgado in the office today.    As you recall, Bryant Delgaod is a 65 y.o.  male who you are currently following for blurred vision to right eye.  He had a \"black curtain\" over vision with complete loss and other episodes have been partial.   He did have a right temporal artery biopsy on 4/24/2025 which was negative.  He was found to have significant right carotid stenosis and underwent a right TCAR on 5/5/2025.  He is doing well postoperatively his blood pressure is stable.  His right neck incision has healed as well as his left groin.  He is maintained on aspirin, Plavix, and Lipitor.      Review of Systems   Constitutional: Negative.    HENT: Negative.     Eyes:  Positive for visual disturbance.   Respiratory: Negative.     Cardiovascular: Negative.    Gastrointestinal: Negative.    Endocrine: Negative.    Genitourinary: Negative.    Musculoskeletal: Negative.    Skin: Negative.    Allergic/Immunologic: Negative.    Neurological: Negative.    Hematological: Negative.    Psychiatric/Behavioral: Negative.     All other systems reviewed and are negative.    /80   Pulse 63   Ht 162.6 cm (64\")   Wt 70.3 kg (155 lb)   SpO2 98%   BMI 26.61 kg/m²     Physical Exam  Vitals and nursing note reviewed.   Constitutional:       General: He is not in acute distress.     Appearance: Normal appearance. He is well-developed. He is not diaphoretic.   HENT:      Head: Normocephalic and atraumatic.   Neck:      Vascular: No carotid bruit or JVD.      Comments: Right neck incision healed  Cardiovascular:      Rate and Rhythm: Normal rate and regular rhythm.      Pulses: Normal pulses.          "  Femoral pulses are 2+ on the right side and 2+ on the left side.       Popliteal pulses are 2+ on the right side and 2+ on the left side.        Dorsalis pedis pulses are 2+ on the right side and 2+ on the left side.        Posterior tibial pulses are 2+ on the right side and 2+ on the left side.      Heart sounds: Normal heart sounds, S1 normal and S2 normal. No murmur heard.     No friction rub. No gallop.      Comments: Left groin healed  Pulmonary:      Effort: Pulmonary effort is normal.      Breath sounds: Normal breath sounds.   Abdominal:      General: Bowel sounds are normal. There is no abdominal bruit.      Palpations: Abdomen is soft.      Tenderness: There is no abdominal tenderness.   Musculoskeletal:         General: Normal range of motion.   Skin:     General: Skin is warm and dry.   Neurological:      General: No focal deficit present.      Mental Status: He is alert and oriented to person, place, and time.      Cranial Nerves: No cranial nerve deficit.   Psychiatric:         Mood and Affect: Mood normal.         Behavior: Behavior normal.         Thought Content: Thought content normal.         Judgment: Judgment normal.          Patient Active Problem List   Diagnosis    Sensorineural hearing loss (SNHL) of both ears    Tinnitus of both ears    Auditory hallucinations    RMSF (Bryce Mountain spotted fever)    Amaurosis fugax of right eye    Bilateral carotid artery stenosis    Preop testing    Symptomatic stenosis of right carotid artery    Hypertension    Hyperlipidemia        Diagnosis Plan   1. Stenosis of right carotid artery  US Carotid Bilateral      2. Amaurosis fugax of right eye        3. Bilateral carotid artery stenosis  US Carotid Bilateral      4. Primary hypertension                Plan: After thoroughly evaluating Bryant Delgado, I am pleased to report he is doing well status post right TCAR.  His right neck incision has healed as well as his left groin.  He has only been taking  his lisinopril if blood pressure gets over 140.  He reports he is only had to take this once since surgery.  I encouraged him to continue to monitor as this may return back to baseline.  He is free to resume normal activity without restriction.  We will see him back in 6 months with repeat carotid duplex for continued surveillance.  I did discuss vascular risk factors as a pertains to the progression of vascular disease including controlling his hypertension.  His blood pressure stable currently.  He should continue his aspirin 81 mg daily, Plavix 75 mg daily and Lipitor 40 mg nightly in addition to his other medications.  This was all discussed in full with complete understanding.  Thank you for allowing me to participate in the care of your patient.  Please do not hesitate to call with any questions or concerns.  We will keep you aware of any further encounters with Bryant Delgado.        Sincerely yours,         ALEJANDRO Lopez, Bennett Caldwell MD

## 2025-08-07 ENCOUNTER — OFFICE VISIT (OUTPATIENT)
Dept: FAMILY MEDICINE CLINIC | Facility: CLINIC | Age: 65
End: 2025-08-07
Payer: MEDICARE

## 2025-08-07 VITALS
HEART RATE: 74 BPM | OXYGEN SATURATION: 98 % | DIASTOLIC BLOOD PRESSURE: 74 MMHG | WEIGHT: 149 LBS | TEMPERATURE: 97.5 F | BODY MASS INDEX: 25.44 KG/M2 | SYSTOLIC BLOOD PRESSURE: 136 MMHG | HEIGHT: 64 IN

## 2025-08-07 DIAGNOSIS — I10 PRIMARY HYPERTENSION: Primary | ICD-10-CM

## 2025-08-07 RX ORDER — LISINOPRIL 10 MG/1
TABLET ORAL
Qty: 90 TABLET | Refills: 3 | Status: SHIPPED | OUTPATIENT
Start: 2025-08-07

## (undated) DEVICE — DRP SPECIAL PROC 4PC W/FC POUCH

## (undated) DEVICE — ST MIC/INTRO ACC SHRP/NDL TUNG/TP NITNL 5F 45CM 7CM

## (undated) DEVICE — TB SXN SURG DLP MALL/CARD SFT/TP 6F 3IN

## (undated) DEVICE — INTENDED FOR TISSUE SEPARATION, AND OTHER PROCEDURES THAT REQUIRE A SHARP SURGICAL BLADE TO PUNCTURE OR CUT.: Brand: BARD-PARKER ®  SAFETY SCALPED

## (undated) DEVICE — 6MM X 25MM: Brand: ENROUTE ENFLATE TRANSCAROTID RX BALLOON DILATATION CATHETER

## (undated) DEVICE — STERILE (14X122CM) TELESCOPICALLY-FOLDED COVER: Brand: CIV-CLEAR™ TRANSDUCER COVER

## (undated) DEVICE — GAUZE,SPONGE,4"X4",16PLY,XRAY,STRL,LF: Brand: MEDLINE

## (undated) DEVICE — GW STARTER JB PFTE/HEP/COAT STR/TP .035IN 15X260CM

## (undated) DEVICE — BNDG ADHS CURAD FLX/FABRC 1X3IN STRL LF

## (undated) DEVICE — DRSNG SURESITE WNDW 4X4.5

## (undated) DEVICE — TRAP FLD MINIVAC MEGADYNE 100ML

## (undated) DEVICE — STRIP,CLOSURE,WOUND,MEDI-STRIP,1/2X4: Brand: MEDLINE

## (undated) DEVICE — GLV SURG SENSICARE W/ALOE PF LF 7.5 STRL

## (undated) DEVICE — SYR LUERLOK 30CC

## (undated) DEVICE — SNAP KOVER: Brand: UNBRANDED

## (undated) DEVICE — STERILE ULTRASOUND GEL, SAFEWRAP: Brand: ECOVUE

## (undated) DEVICE — ANGLED-TIP ARTERIAL SHEATH CONFIGURATION: Brand: ENROUTE TRANSCAROTID NEUROPROTECTION SYSTEM

## (undated) DEVICE — SUT MNCRYL 4/0 PS2 27IN UD MCP426H

## (undated) DEVICE — TOWEL,OR,DSP,ST,BLUE,STD,4/PK,20PK/CS: Brand: MEDLINE

## (undated) DEVICE — GAUZE,SPONGE,4"X4",12PLY,STERILE,LF,2'S: Brand: MEDLINE

## (undated) DEVICE — 3M™ IOBAN™ 2 ANTIMICROBIAL INCISE DRAPE 6650EZ: Brand: IOBAN™ 2

## (undated) DEVICE — SUT PROLN 5/0 C1 DA 24IN 8725H

## (undated) DEVICE — SUT SILK 3/0 SUTUPAK TIES 24IN SA74H

## (undated) DEVICE — KT INTRO MIC TROC 4F 21GA 7CM KT/EA/1

## (undated) DEVICE — PAD MINOR UNIVERSAL: Brand: MEDLINE INDUSTRIES, INC.

## (undated) DEVICE — SUT VIC 4/0 RB1 27IN VCP214H

## (undated) DEVICE — GW ENROUTE HC .014IN 95CM

## (undated) DEVICE — NDL HYPO PRECISIONGLIDE REG 25G 1 1/2

## (undated) DEVICE — SYR LL TP 10ML STRL

## (undated) DEVICE — INFLATION DEVICE: Brand: ENCORE™ 26

## (undated) DEVICE — PAD MAJOR VASCULAR: Brand: MEDLINE INDUSTRIES, INC.

## (undated) DEVICE — INTENDED FOR TISSUE SEPARATION, AND OTHER PROCEDURES THAT REQUIRE A SHARP SURGICAL BLADE TO PUNCTURE OR CUT.: Brand: BARD-PARKER ® STAINLESS STEEL BLADES

## (undated) DEVICE — SYR SLP TP 10ML DISP

## (undated) DEVICE — ELECTRD NDL EZ CLN STD 2.75IN

## (undated) DEVICE — PROXIMATE RH ROTATING HEAD SKIN STAPLERS (35 WIDE) CONTAINS 35 STAINLESS STEEL STAPLES: Brand: PROXIMATE

## (undated) DEVICE — SUT SILK 4/0 SUTUPAK TIES 24IN SA73H

## (undated) DEVICE — ANTIBACTERIAL UNDYED BRAIDED (POLYGLACTIN 910), SYNTHETIC ABSORBABLE SUTURE: Brand: COATED VICRYL

## (undated) DEVICE — SYR LUERLOK 20CC BX/50

## (undated) DEVICE — APPL CHLORAPREP HI/LITE 26ML ORNG

## (undated) DEVICE — SUT SILK 2/0 SH CR8 18IN CR8 C012D